# Patient Record
Sex: FEMALE | Race: WHITE | Employment: OTHER | URBAN - METROPOLITAN AREA
[De-identification: names, ages, dates, MRNs, and addresses within clinical notes are randomized per-mention and may not be internally consistent; named-entity substitution may affect disease eponyms.]

---

## 2017-01-05 ENCOUNTER — ANESTHESIA EVENT (OUTPATIENT)
Dept: ENDOSCOPY | Age: 72
End: 2017-01-05
Payer: MEDICARE

## 2017-01-06 ENCOUNTER — ANESTHESIA (OUTPATIENT)
Dept: ENDOSCOPY | Age: 72
End: 2017-01-06
Payer: MEDICARE

## 2017-01-06 ENCOUNTER — SURGERY (OUTPATIENT)
Age: 72
End: 2017-01-06

## 2017-01-06 PROCEDURE — 74011250636 HC RX REV CODE- 250/636

## 2017-01-06 PROCEDURE — 74011000250 HC RX REV CODE- 250

## 2017-01-06 RX ORDER — LIDOCAINE HYDROCHLORIDE 20 MG/ML
INJECTION, SOLUTION EPIDURAL; INFILTRATION; INTRACAUDAL; PERINEURAL AS NEEDED
Status: DISCONTINUED | OUTPATIENT
Start: 2017-01-06 | End: 2017-01-06 | Stop reason: HOSPADM

## 2017-01-06 RX ORDER — PROPOFOL 10 MG/ML
INJECTION, EMULSION INTRAVENOUS AS NEEDED
Status: DISCONTINUED | OUTPATIENT
Start: 2017-01-06 | End: 2017-01-06 | Stop reason: HOSPADM

## 2017-01-06 RX ADMIN — PROPOFOL 20 MG: 10 INJECTION, EMULSION INTRAVENOUS at 08:43

## 2017-01-06 RX ADMIN — PROPOFOL 20 MG: 10 INJECTION, EMULSION INTRAVENOUS at 08:41

## 2017-01-06 RX ADMIN — PROPOFOL 20 MG: 10 INJECTION, EMULSION INTRAVENOUS at 08:45

## 2017-01-06 RX ADMIN — PROPOFOL 20 MG: 10 INJECTION, EMULSION INTRAVENOUS at 08:39

## 2017-01-06 RX ADMIN — PROPOFOL 20 MG: 10 INJECTION, EMULSION INTRAVENOUS at 08:37

## 2017-01-06 RX ADMIN — PROPOFOL 20 MG: 10 INJECTION, EMULSION INTRAVENOUS at 08:47

## 2017-01-06 RX ADMIN — LIDOCAINE HYDROCHLORIDE 40 MG: 20 INJECTION, SOLUTION EPIDURAL; INFILTRATION; INTRACAUDAL; PERINEURAL at 08:37

## 2017-01-06 NOTE — ANESTHESIA PREPROCEDURE EVALUATION
Anesthetic History   No history of anesthetic complications            Review of Systems / Medical History  Patient summary reviewed, nursing notes reviewed and pertinent labs reviewed    Pulmonary          Smoker      Comments: Former smoker   Neuro/Psych   Within defined limits           Cardiovascular    Hypertension: well controlled              Exercise tolerance: >4 METS     GI/Hepatic/Renal     GERD: well controlled           Endo/Other  Within defined limits           Other Findings              Physical Exam    Airway  Mallampati: I  TM Distance: 4 - 6 cm  Neck ROM: normal range of motion   Mouth opening: Normal     Cardiovascular    Rhythm: regular  Rate: normal         Dental  No notable dental hx       Pulmonary  Breath sounds clear to auscultation               Abdominal  GI exam deferred       Other Findings            Anesthetic Plan    ASA: 2  Anesthesia type: total IV anesthesia          Induction: Intravenous  Anesthetic plan and risks discussed with: Patient

## 2017-01-06 NOTE — ANESTHESIA POSTPROCEDURE EVALUATION
Post-Anesthesia Evaluation and Assessment    Patient: Yessenia Tatum MRN: [id number]  SSN: [id number]    YOB: 1945  Age: 66 y.o. Sex: female       Cardiovascular Function/Vital Signs  Visit Vitals    /60    Pulse 73    Temp 36.5 °C (97.7 °F)    Resp 15    Ht 5' 3.5\" (1.613 m)    Wt 57.3 kg (126 lb 5 oz)    SpO2 95%    BMI 22.02 kg/m2       Patient is status post total IV anesthesia, general anesthesia for Procedure(s):  COLONOSCOPY. Nausea/Vomiting: None    Postoperative hydration reviewed and adequate. Pain:  Pain Scale 1: Visual (01/06/17 0857)  Pain Intensity 1: 0 (01/06/17 0857)   Managed    Neurological Status: At baseline    Mental Status and Level of Consciousness: Arousable    Pulmonary Status:   O2 Device: Room air (01/06/17 0857)   Adequate oxygenation and airway patent    Complications related to anesthesia: None    Post-anesthesia assessment completed.  No concerns    Signed By: Cameron Santiago MD     January 6, 2017

## 2017-08-21 ENCOUNTER — OFFICE VISIT (OUTPATIENT)
Dept: INTERNAL MEDICINE CLINIC | Age: 72
End: 2017-08-21

## 2017-08-21 VITALS
DIASTOLIC BLOOD PRESSURE: 82 MMHG | WEIGHT: 129 LBS | BODY MASS INDEX: 22.86 KG/M2 | SYSTOLIC BLOOD PRESSURE: 120 MMHG | HEIGHT: 63 IN

## 2017-08-21 DIAGNOSIS — L03.116 CELLULITIS OF LEG WITHOUT FOOT, LEFT: Primary | ICD-10-CM

## 2017-08-21 RX ORDER — ATENOLOL 50 MG/1
TABLET ORAL
Refills: 6 | COMMUNITY
Start: 2017-08-05 | End: 2017-08-21 | Stop reason: SDUPTHER

## 2017-08-21 RX ORDER — CEFDINIR 300 MG/1
300 CAPSULE ORAL 2 TIMES DAILY
Qty: 20 CAP | Refills: 0 | Status: SHIPPED | OUTPATIENT
Start: 2017-08-21 | End: 2017-12-13 | Stop reason: ALTCHOICE

## 2017-08-21 NOTE — PROGRESS NOTES
Barbara Poe is a 66 y.o. female presenting for Wound Check (RM 3  - 2 wounds on L leg - one from fall and one that car door hitting it)  . 1. Have you been to the ER, urgent care clinic since your last visit? Hospitalized since your last visit? No    2. Have you seen or consulted any other health care providers outside of the [address] [place] [address] since your last visit? Include any pap smears or colon screening.  Yes When: july Where: Dr Marilu Lopez Reason for visit: Tisha Rudd

## 2017-08-21 NOTE — MR AVS SNAPSHOT
Visit Information Date & Time Provider Department Dept. Phone Encounter #  
 8/21/2017  2:40 PM ADAL Weller MD [facility] [id number] Follow-up Instructions Return if symptoms worsen or fail to improve. Your Appointments 12/13/2017  8:30 AM  
COMPLETE PHYSICAL with MD ANNA Guy [facility] ([facility]) Appt Note: [facility] [address] [id number] [address] [facility] [phone] Upcoming Health Maintenance Date Due DTaP/Tdap/Td series (1 - Tdap) 9/6/1964 FOBT Q 1 YEAR AGE 50-75 9/6/1993 ZOSTER VACCINE AGE 60> 7/6/2003 GLAUCOMA SCREENING Q2Y 9/6/2008 OSTEOPOROSIS SCREENING (DEXA) 9/6/2008 Pneumococcal 65+ Low/Medium Risk (1 of 2 - PCV13) 9/6/2008 MEDICARE YEARLY EXAM 9/6/2008 BREAST CANCER SCRN MAMMOGRAM 8/24/2011 INFLUENZA AGE 9 TO ADULT 8/1/2017 Allergies as of 8/21/2017  Review Complete On: 8/21/2017 By: Gloria Rosales MD  
 No Known Allergies Current Immunizations  Never Reviewed No immunizations on file. Not reviewed this visit You Were Diagnosed With   
  
 Codes Comments Cellulitis of leg without foot, left    -  Primary ICD-10-CM: Z85.208 ICD-9-CM: 118. 6 Vitals BP Height(growth percentile) Weight(growth percentile) BMI OB Status Smoking Status 120/82 (BP 1 Location: Left arm) 5' 3\" (1.6 m) 129 lb (58.5 kg) 22.85 kg/m2 Postmenopausal Former Smoker Vitals History BMI and BSA Data Body Mass Index Body Surface Area  
 22.85 kg/m 2 1.61 m 2 Preferred Pharmacy Pharmacy Name Phone [phone] [facility] [phone] Your Updated Medication List  
  
   
This list is accurate as of: 8/21/17  3:56 PM.  Always use your most recent med list.  
  
 atenolol 25 mg tablet Commonly known as:  TENORMIN Take 25 mg by mouth two (2) times a day. cefdinir 300 mg capsule Commonly known as:  OMNICEF Take 1 Cap by mouth two (2) times a day. Prescriptions Sent to Pharmacy Refills  
 cefdinir (OMNICEF) 300 mg capsule 0 Sig: Take 1 Cap by mouth two (2) times a day. Class: Normal  
 Pharmacy: [address] #: [phone] Route: Oral  
  
Follow-up Instructions Return if symptoms worsen or fail to improve. Introducing [facility]! Vick Carey introduces [employer] patient portal. Now you can access parts of your medical record, email your doctor's office, and request medication refills online. 1. In your internet browser, go to [email or web address] 2. Click on the First Time User? Click Here link in the Sign In box. You will see the New Member Sign Up page. 3. Enter your [employer] Access Code exactly as it appears below. You will not need to use this code after youve completed the sign-up process. If you do not sign up before the expiration date, you must request a new code. · [employer] Access Code: [email or web address] Expires: 11/19/2017  2:30 PM 
 
4. Enter the last four digits of your Social Security Number (xxxx) and Date of Birth (mm/dd/yyyy) as indicated and click Submit. You will be taken to the next sign-up page. 5. Create a [employer] ID. This will be your [employer]iny login ID and cannot be changed, so think of one that is secure and easy to remember. 6. Create a [employer] password. You can change your password at any time. 7. Enter your Password Reset Question and Answer. This can be used at a later time if you forget your password. 8. Enter your e-mail address. You will receive e-mail notification when new information is available in [address] 9. Click Sign Up. You can now view and download portions of your medical record. 10. Click the Download Summary menu link to download a portable copy of your medical information. If you have questions, please visit the Frequently Asked Questions section of the [employer] website. Remember, [employer] is NOT to be used for urgent needs. For medical emergencies, dial 911. Now available from your iPhone and Android! Please provide this summary of care documentation to your next provider. Your primary care clinician is listed as Willie Romero. If you have any questions after today's visit, please call [phone].

## 2017-08-21 NOTE — PROGRESS NOTES
This note will not be viewable in [address] Cyrus Ching is a [address] y.o. female and presents with Wound Check (RM 3  - 2 wounds on L leg - one from fall and one that car door hitting it)  . Subjective:  Mrs. klein presents today with complaint of a wound on her left lower leg. This occurred after the car door hit her leg and she felt a large blood blister. It spontaneously drained and the skin that was overlying came off as well. She subsequently fell and skinned her left knee as well. Because she had redness and warmth surrounding the site she presented today for evaluation. She denies any fever chills or rigors. She denies any drainage. Past Medical History:   Diagnosis Date    GERD (gastroesophageal reflux disease)     Hypertension      Past Surgical History:   Procedure Laterality Date    COLONOSCOPY N/A 1/6/2017    COLONOSCOPY performed by Javier Xiong MD at [facility] ENDOSCOPY    HX HEENT      eye surgery    ID COLON CA SCRN NOT HI RSK IND  1/6/2017          No Known Allergies  Current Outpatient Prescriptions   Medication Sig Dispense Refill    cefdinir (OMNICEF) 300 mg capsule Take 1 Cap by mouth two (2) times a day. 20 Cap 0    atenolol (TENORMIN) 25 mg tablet Take 25 mg by mouth two (2) times a day. Social History     Social History    Marital status: UNKNOWN     Spouse name: N/A    Number of children: N/A    Years of education: N/A     Social History Main Topics    Smoking status: Former Smoker    Smokeless tobacco: Never Used    Alcohol use 0.6 oz/week     1 Glasses of wine per week    Drug use: No    Sexual activity: Not Asked     Other Topics Concern    None     Social History Narrative     History reviewed. No pertinent family history.     Review of Systems  Constitutional:  negative for fevers, chills, anorexia and weight loss  Eyes:    negative for visual disturbance and irritation  ENT:    negative for tinnitus,sore throat,nasal congestion,ear pains.hoarseness  Respiratory:     negative for cough, hemoptysis, dyspnea,wheezing  CV:    negative for chest pain, palpitations, lower extremity edema  GI:    negative for nausea, vomiting, diarrhea, abdominal pain,melena  Endo:               negative for polyuria,polydipsia,polyphagia,heat intolerance  Genitourinary : negative for frequency, dysuria and hematuria  Integumentary: negative for rash and pruritus  Hematologic:   negative for easy bruising and gum/nose bleeding  Musculoskel:  negative for myalgias, arthralgias, back pain, muscle weakness, joint pain  Neurological:   negative for headaches, dizziness, vertigo, memory problems and gait   Behavl/Psych:  negative for feelings of anxiety, depression, mood changes  ROS otherwise negative      Objective:  Visit Vitals    /82 (BP 1 Location: Left arm)    Ht 5' 3\" (1.6 m)    Wt 129 lb (58.5 kg)    BMI 22.85 kg/m2     Physical Exam:   General appearance - alert, well appearing, and in no distress  Mental status - alert, oriented to person, place, and time  EYE-TATYANA, EOMI, fundi normal, corneas normal, no foreign bodies  ENT-ENT exam normal, no neck nodes or sinus tenderness  Nose - normal and patent, no erythema, discharge or polyps  Mouth - mucous membranes moist, pharynx normal without lesions  Neck - supple, no significant adenopathy   Chest - clear to auscultation, no wheezes, rales or rhonchi, symmetric air entry   Heart - normal rate, regular rhythm, normal S1, S2, no murmurs, rubs, clicks or gallops   Abdomen - soft, nontender, nondistended, no masses or organomegaly  Lymph- no adenopathy palpable  Ext-1 cm ulceration with surrounding erythema and calor measuring approximately 6 cm in diameter  On the left lower extremity. Over the left knee there is a small abrasion measuring approximately half centimeter each. Skin-Warm and dry.  no hyperpigmentation, vitiligo, or suspicious lesions  Neuro -alert, oriented, normal speech, no focal findings or movement disorder noted      Assessment/Plan:  Diagnoses and all orders for this visit:    1. Cellulitis of leg without foot, left    Other orders  -     cefdinir (OMNICEF) 300 mg capsule; Take 1 Cap by mouth two (2) times a day. ICD-10-CM ICD-9-CM    1. Cellulitis of leg without foot, left L03.116 682.6      Plan:    Cover cellulitis with a cephalosporin as noted above. Dressed with Neosporin and bandaged as appropriate. Follow-up if not improved. Follow-up Disposition:  Return if symptoms worsen or fail to improve. I have reviewed with the patient details of the assessment and plan and all questions were answered. Relevent patient education was performed. Verbal and/or written instructions (see AVS) provided. The most recent lab findings were reviewed with the patient. An After Visit Summary was printed and given to the patient.     Yecenia Luis MD

## 2017-12-11 PROBLEM — I10 HYPERTENSION, BENIGN: Status: ACTIVE | Noted: 2017-12-11

## 2017-12-11 PROBLEM — Z79.899 ON STATIN THERAPY: Status: ACTIVE | Noted: 2017-12-11

## 2017-12-11 PROBLEM — L84 PRE-ULCERATIVE CORN OR CALLOUS: Status: ACTIVE | Noted: 2017-12-11

## 2017-12-11 PROBLEM — F32.A DEPRESSION: Status: ACTIVE | Noted: 2017-12-11

## 2017-12-11 PROBLEM — H26.9 CATARACT: Status: ACTIVE | Noted: 2017-12-11

## 2017-12-11 PROBLEM — N18.9 CHRONIC KIDNEY INSUFFICIENCY: Status: ACTIVE | Noted: 2017-12-11

## 2017-12-11 PROBLEM — M10.9 GOUT: Status: ACTIVE | Noted: 2017-12-11

## 2017-12-11 PROBLEM — H40.9 GLAUCOMA: Status: ACTIVE | Noted: 2017-12-11

## 2017-12-11 PROBLEM — E11.9 DIABETES MELLITUS TYPE 2, DIET-CONTROLLED (HCC): Status: ACTIVE | Noted: 2017-12-11

## 2017-12-11 PROBLEM — R53.83 FATIGUE: Status: ACTIVE | Noted: 2017-12-11

## 2017-12-11 PROBLEM — S99.921A RIGHT FOOT INJURY: Status: ACTIVE | Noted: 2017-12-11

## 2017-12-11 PROBLEM — E78.5 DYSLIPIDEMIA: Status: ACTIVE | Noted: 2017-12-11

## 2017-12-11 RX ORDER — DORZOLAMIDE HCL 20 MG/ML
2 SOLUTION/ DROPS OPHTHALMIC 3 TIMES DAILY
COMMUNITY
End: 2018-07-22

## 2017-12-11 RX ORDER — ASPIRIN 81 MG/1
TABLET ORAL DAILY
COMMUNITY

## 2017-12-11 RX ORDER — ASCORBIC ACID 250 MG
250 TABLET ORAL DAILY
COMMUNITY

## 2017-12-13 ENCOUNTER — OFFICE VISIT (OUTPATIENT)
Dept: INTERNAL MEDICINE CLINIC | Age: 72
End: 2017-12-13

## 2017-12-13 VITALS
BODY MASS INDEX: 22.47 KG/M2 | WEIGHT: 126.8 LBS | SYSTOLIC BLOOD PRESSURE: 138 MMHG | RESPIRATION RATE: 14 BRPM | DIASTOLIC BLOOD PRESSURE: 78 MMHG | TEMPERATURE: 98 F | HEART RATE: 65 BPM | HEIGHT: 63 IN | OXYGEN SATURATION: 98 %

## 2017-12-13 DIAGNOSIS — E11.9 DIABETES MELLITUS TYPE 2, DIET-CONTROLLED (HCC): ICD-10-CM

## 2017-12-13 DIAGNOSIS — Z13.31 SCREENING FOR DEPRESSION: ICD-10-CM

## 2017-12-13 DIAGNOSIS — E78.5 DYSLIPIDEMIA: ICD-10-CM

## 2017-12-13 DIAGNOSIS — I10 HYPERTENSION, BENIGN: ICD-10-CM

## 2017-12-13 DIAGNOSIS — Z13.1 SCREENING FOR DIABETES MELLITUS: ICD-10-CM

## 2017-12-13 DIAGNOSIS — Z23 ENCOUNTER FOR IMMUNIZATION: ICD-10-CM

## 2017-12-13 DIAGNOSIS — N18.1 CHRONIC RENAL IMPAIRMENT, STAGE 1: ICD-10-CM

## 2017-12-13 DIAGNOSIS — Z00.00 MEDICARE ANNUAL WELLNESS VISIT, SUBSEQUENT: Primary | ICD-10-CM

## 2017-12-13 DIAGNOSIS — Z13.39 SCREENING FOR ALCOHOLISM: ICD-10-CM

## 2017-12-13 DIAGNOSIS — R53.83 FATIGUE, UNSPECIFIED TYPE: ICD-10-CM

## 2017-12-13 DIAGNOSIS — M10.00 IDIOPATHIC GOUT, UNSPECIFIED CHRONICITY, UNSPECIFIED SITE: ICD-10-CM

## 2017-12-13 LAB
ALBUMIN SERPL-MCNC: 4.7 G/DL (ref 3.9–5.4)
ALKALINE PHOS POC: 75 U/L (ref 38–126)
ALT SERPL-CCNC: 23 U/L (ref 9–52)
AST SERPL-CCNC: 28 U/L (ref 14–36)
BACTERIA UA POCT, BACTPOCT: NORMAL
BILIRUB UR QL STRIP: NEGATIVE
BUN BLD-MCNC: 26 MG/DL (ref 7–17)
CALCIUM BLD-MCNC: 10.6 MG/DL (ref 8.4–10.2)
CASTS UA POCT: NORMAL
CHLORIDE BLD-SCNC: 102 MMOL/L (ref 98–107)
CHOLEST SERPL-MCNC: 284 MG/DL (ref 0–200)
CLUE CELLS, CLUEPOCT: NEGATIVE
CO2 POC: 28 MMOL/L (ref 22–32)
CREAT BLD-MCNC: 0.9 MG/DL (ref 0.7–1.2)
CRYSTALS UA POCT, CRYSPOCT: NEGATIVE
EGFR (POC): 63
EPITHELIAL CELLS POCT: NORMAL
GLUCOSE POC: 134 MG/DL (ref 65–105)
GLUCOSE UR-MCNC: NEGATIVE MG/DL
GRAN# POC: 7.4 K/UL (ref 2–7.8)
GRAN% POC: 69.6 % (ref 37–92)
HBA1C MFR BLD HPLC: 5.4 % (ref 4.5–5.7)
HCT VFR BLD CALC: 48.5 % (ref 37–51)
HDLC SERPL-MCNC: 40 MG/DL (ref 35–130)
HGB BLD-MCNC: 15.9 G/DL (ref 12–18)
KETONES P FAST UR STRIP-MCNC: NEGATIVE MG/DL
LDL CHOLESTEROL POC: 209.4 MG/DL (ref 0–130)
LY# POC: 2.8 K/UL (ref 0.6–4.1)
LY% POC: 27.5 % (ref 10–58.5)
MCH RBC QN: 27.6 PG (ref 26–32)
MCHC RBC-ENTMCNC: 32.8 G/DL (ref 30–36)
MCV RBC: 84 FL (ref 80–97)
MICROALBUMIN UR TEST STR-MCNC: 100 MG/L (ref 0–20)
MID #, POC: 0.3 K/UL (ref 0–1.8)
MID% POC: 2.9 % (ref 0.1–24)
MUCUS UA POCT, MUCPOCT: NORMAL
PH UR STRIP: 6 [PH] (ref 5–7)
PLATELET # BLD: 224 K/UL (ref 140–440)
POTASSIUM SERPL-SCNC: 4 MMOL/L (ref 3.6–5)
PROT SERPL-MCNC: 7.6 G/DL (ref 6.3–8.2)
PROT UR QL STRIP: NORMAL
RBC # BLD: 5.76 M/UL (ref 4.2–6.3)
RBC UA POCT, RBCPOCT: NORMAL
SODIUM SERPL-SCNC: 145 MMOL/L (ref 137–145)
SP GR UR STRIP: 1.02 (ref 1.01–1.02)
TCHOL/HDL RATIO (POC): 7.1 (ref 0–4)
TOTAL BILIRUBIN POC: 1 MG/DL (ref 0.2–1.3)
TRICH UA POCT, TRICHPOC: NEGATIVE
TRIGL SERPL-MCNC: 173 MG/DL (ref 0–200)
UA UROBILINOGEN AMB POC: NORMAL (ref 0.2–1)
URINALYSIS CLARITY POC: CLEAR
URINALYSIS COLOR POC: NORMAL
URINE BLOOD POC: NORMAL
URINE CULT COMMENT, POCT: NORMAL
URINE LEUKOCYTES POC: NEGATIVE
URINE NITRITES POC: NEGATIVE
VLDLC SERPL CALC-MCNC: 34.6 MG/DL
WBC # BLD: 10.5 K/UL (ref 4.1–10.9)
WBC UA POCT, WBCPOCT: NORMAL
YEAST UA POCT, YEASTPOC: NEGATIVE

## 2017-12-13 NOTE — PROGRESS NOTES
1. Have you been to the ER, urgent care clinic since your last visit? Hospitalized since your last visit? No    2. Have you seen or consulted any other health care providers outside of the [address] [place] [address] since your last visit? Include any pap smears or colon screening. No     Chief Complaint   Patient presents with    Annual Wellness Visit     Fasting     Eye exam last done around 5/2017 with Dr. Chris Haas.

## 2017-12-13 NOTE — PROGRESS NOTES
This is a Subsequent Medicare Annual Wellness Exam (AWV) (Performed 12 months after IPPE or effective date of Medicare Part B enrollment)    I have reviewed the patient's medical history in detail and updated the computerized patient record. History     Past Medical History:   Diagnosis Date    Cataract 12/11/2017    Chronic kidney insufficiency 12/11/2017    Depression 12/11/2017    Diabetes mellitus type 2, diet-controlled ([address] 12/11/2017    Dyslipidemia 12/11/2017    Fatigue 12/11/2017    GERD (gastroesophageal reflux disease)     Glaucoma 12/11/2017    Gout 12/11/2017    Hypertension     Hypertension, benign 12/11/2017    On statin therapy 12/11/2017    Pre-ulcerative corn or callous 12/11/2017    Right foot injury 12/11/2017      Past Surgical History:   Procedure Laterality Date    COLONOSCOPY N/A 1/6/2017    COL[facility]    HX HEENT      eye surgery    TX COLON CA SCRN NOT HI RSK IND  1/6/2017          Current Outpatient Prescriptions   Medication Sig Dispense Refill    aspirin delayed-release 81 mg tablet Take  by mouth daily.  ascorbic acid, vitamin C, (VITAMIN C) 250 mg tablet Take  by mouth.  CALCIUM CARBONATE/VITAMIN D3 (CALCIUM+D PO) Take  by mouth.  atenolol (TENORMIN) 25 mg tablet Take 25 mg by mouth two (2) times a day.  dorzolamide (TRUSOPT) 2 % ophthalmic solution Administer 2 Drops to both eyes three (3) times daily. Allergies   Allergen Reactions    Diclofenac Unknown (comments)     History reviewed. No pertinent family history.   Social History   Substance Use Topics    Smoking status: Former Smoker    Smokeless tobacco: Never Used    Alcohol use 0.6 oz/week     1 Glasses of wine per week     Patient Active Problem List   Diagnosis Code    Glaucoma H40.9    Chronic kidney insufficiency N18.9    Diabetes mellitus type 2, diet-controlled ([address] E11.9    Dyslipidemia E78.5    Hypertension, benign I10    Cataract H26.9    Pre-ulcerative corn or callous L84    Right foot injury S99.921A    Depression F32.9    Gout M10.9    On statin therapy Z79.899    Fatigue R53.83       Depression Risk Factor Screening:     PHQ over the last two weeks 8/21/2017   Little interest or pleasure in doing things Not at all   Feeling down, depressed or hopeless Not at all   Total Score PHQ 2 0     Alcohol Risk Factor Screening: You do not drink alcohol or very rarely. Functional Ability and Level of Safety:   Hearing Loss  Hearing is good. Activities of Daily Living  The home contains: no safety equipment. Patient does total self care    Fall RiskFall Risk Assessment, last 12 mths 8/21/2017   Able to walk? Yes   Fall in past 12 months? Yes   Fall with injury? Yes   Number of falls in past 12 months 1   Fall Risk Score 2       Abuse Screen  Patient is not abused    Cognitive Screening   Evaluation of Cognitive Function:  Has your family/caregiver stated any concerns about your memory: no  Normal    Patient Care Team   Patient Care Team:  Shellie Cheema MD as PCP - General (Dermatology)    Assessment/Plan   Education and counseling provided:  Are appropriate based on today's review and evaluation  Pneumococcal Vaccine  Influenza Vaccine  Screening Mammography  Screening Pap and pelvic (covered once every 2 years)  Colorectal cancer screening tests  Cardiovascular screening blood test  Bone mass measurement (DEXA)  Diabetes screening test    Diagnoses and all orders for this visit:    1. Medicare annual wellness visit, subsequent    2. Screening for alcoholism  -     Annual  Alcohol Screen 15 min ()    3. Screening for depression  -     Depression Screen Annual    4.  Screening for diabetes mellitus      Health Maintenance Due   Topic Date Due    HEMOGLOBIN A1C Q6M  1945    LIPID PANEL Q1  1945    FOOT EXAM Q1  09/06/1953    MICROALBUMIN Q1  09/06/1953    EYE EXAM RETINAL OR DILATED Q1  09/06/1953    DTaP/Tdap/Td series (1 - Tdap) 09/06/1964    FOBT Q 1 YEAR AGE 50-75  09/06/1993    ZOSTER VACCINE AGE 60>  07/06/2003    GLAUCOMA SCREENING Q2Y  09/06/2008    OSTEOPOROSIS SCREENING (DEXA)  09/06/2008    MEDICARE YEARLY EXAM  09/06/2008     This note will not be viewable in MyChart. Harry Stiles is a 74 y.o. female and presents with Annual Wellness Visit  . Subjective: This is Rubik presents today for follow-up of hyperlipidemia, adult onset diabetes mellitus which is diet-controlled, mild renal insufficiency and history of hypertension. She has been generally feeling well without significant complaint. She has had no chest pain, shortness breath, PND, orthopnea, or pedal edema. Past Medical History:   Diagnosis Date    Cataract 12/11/2017    Chronic kidney insufficiency 12/11/2017    Depression 12/11/2017    Diabetes mellitus type 2, diet-controlled ([address] 12/11/2017    Dyslipidemia 12/11/2017    Fatigue 12/11/2017    GERD (gastroesophageal reflux disease)     Glaucoma 12/11/2017    Gout 12/11/2017    Hypertension     Hypertension, benign 12/11/2017    On statin therapy 12/11/2017    Pre-ulcerative corn or callous 12/11/2017    Right foot injury 12/11/2017     Past Surgical History:   Procedure Laterality Date    COLONOSCOPY N/A 1/6/2017    COL[facility] Jacinto MD at [facility] ENDOSCOPY    HX HEENT      eye surgery    MO COLON CA SCRN NOT HI RSK IND  1/6/2017          Allergies   Allergen Reactions    Diclofenac Unknown (comments)     Current Outpatient Prescriptions   Medication Sig Dispense Refill    aspirin delayed-release 81 mg tablet Take  by mouth daily.  ascorbic acid, vitamin C, (VITAMIN C) 250 mg tablet Take  by mouth.  CALCIUM CARBONATE/VITAMIN D3 (CALCIUM+D PO) Take  by mouth.  atenolol (TENORMIN) 25 mg tablet Take 25 mg by mouth two (2) times a day.       dorzolamide (TRUSOPT) 2 % ophthalmic solution Administer 2 Drops to both eyes three (3) times daily. Social History     Social History    Marital status: UNKNOWN     Spouse name: N/A    Number of children: N/A    Years of education: N/A     Social History Main Topics    Smoking status: Former Smoker    Smokeless tobacco: Never Used    Alcohol use 0.6 oz/week     1 Glasses of wine per week    Drug use: No    Sexual activity: Not Asked     Other Topics Concern    None     Social History Narrative     History reviewed. No pertinent family history. Review of Systems  Constitutional:  negative for fevers, chills, anorexia and weight loss  Eyes:    negative for visual disturbance and irritation  ENT:    negative for tinnitus,sore throat,nasal congestion,ear pains. hoarseness  Respiratory:     negative for cough, hemoptysis, dyspnea,wheezing  CV:    negative for chest pain, palpitations, lower extremity edema  GI:    negative for nausea, vomiting, diarrhea, abdominal pain,melena  Endo:               negative for polyuria,polydipsia,polyphagia,heat intolerance  Genitourinary : negative for frequency, dysuria and hematuria  Integumentary: negative for rash and pruritus  Hematologic:   negative for easy bruising and gum/nose bleeding  Musculoskel:  negative for myalgias, arthralgias, back pain, muscle weakness, joint pain  Neurological:   negative for headaches, dizziness, vertigo, memory problems and gait   Behavl/Psych:  negative for feelings of anxiety, depression, mood changes  ROS otherwise negative      Objective:  Visit Vitals    /78 (BP 1 Location: Right arm, BP Patient Position: Sitting)    Pulse 65    Temp 98 °F (36.7 °C) (Oral)    Resp 14    Ht 5' 3\" (1.6 m)    Wt 126 lb 12.8 oz (57.5 kg)    SpO2 98%    BMI 22.46 kg/m2     Physical Exam:   General appearance - alert, well appearing, and in no distress  Mental status - alert, oriented to person, place, and time  EYE-TATYANA, EOMI, fundi normal, corneas normal, no foreign bodies  ENT-ENT exam normal, no neck nodes or sinus tenderness  Nose - normal and patent, no erythema, discharge or polyps  Mouth - mucous membranes moist, pharynx normal without lesions  Neck - supple, no significant adenopathy   Chest - clear to auscultation, no wheezes, rales or rhonchi, symmetric air entry   Heart - normal rate, regular rhythm, normal S1, S2, no murmurs, rubs, clicks or gallops   Abdomen - soft, nontender, nondistended, no masses or organomegaly  Lymph- no adenopathy palpable  Ext-peripheral pulses normal, no pedal edema, no clubbing or cyanosis  Skin-Warm and dry. no hyperpigmentation, vitiligo, or suspicious lesions  Neuro -alert, oriented, normal speech, no focal findings or movement disorder noted      Assessment/Plan:  Diagnoses and all orders for this visit:    1. Medicare annual wellness visit, subsequent    2. Screening for alcoholism  -     Annual  Alcohol Screen 15 min ()    3. Screening for depression  -     Depression Screen Annual    4. Screening for diabetes mellitus    5. Diabetes mellitus type 2, diet-controlled (HCC)  -     AMB POC HEMOGLOBIN A1C  -     AMB POC URINE, MICROALBUMIN, SEMIQUANT (1 RESULT)    6. Chronic renal impairment, stage 1    7. Hypertension, benign  -     AMB POC COMPREHENSIVE METABOLIC PANEL  -     AMB POC URINALYSIS DIP STICK AUTO W/ MICRO     8. Dyslipidemia  -     AMB POC LIPID PROFILE    9. Idiopathic gout, unspecified chronicity, unspecified site    10. Fatigue, unspecified type  -     AMB POC COMPLETE CBC,AUTOMATED ENTER    11. Encounter for immunization  -     Pneumococcal Polysaccharide vaccine, 23-Valent, Adult or Immunocompromised  -     ADMIN PNEUMOCOCCAL VACCINE  Medicare Injection Admin Charge          ICD-10-CM ICD-9-CM    1. Medicare annual wellness visit, subsequent Z00.00 V70.0    2. Screening for alcoholism Z13.89 V79.1 [place] ANNUAL ALCOHOL SCREEN 15 MIN   3. Screening for depression Z13.89 V79.0 DEPRESSION SCREEN ANNUAL   4. Screening for diabetes mellitus Z13.1 V77.1    5. Diabetes mellitus type 2, diet-controlled (HCC) E11.9 250.00 AMB POC HEMOGLOBIN A1C      AMB POC URINE, MICROALBUMIN, SEMIQUANT (1 RESULT)   6. Chronic renal impairment, stage 1 N18.1 585.1    7. Hypertension, benign I10 401.1 AMB POC COMPREHENSIVE METABOLIC PANEL      AMB POC URINALYSIS DIP STICK AUTO W/ MICRO    8. Dyslipidemia E78.5 272.4 AMB POC LIPID PROFILE   9. Idiopathic gout, unspecified chronicity, unspecified site M10.00 274.9    10. Fatigue, unspecified type R53.83 780.79 AMB POC COMPLETE CBC,AUTOMATED ENTER   11. Encounter for immunization Z23 V03.89 PNEUMOCOCCAL POLYSACCHARIDE VACCINE, 23-VALENT, ADULT OR IMMUNOSUPPRESSED PT DOSE,      ADMIN PNEUMOCOCCAL VACCINE     Plan:    Follow-up second Pneumovax today. She is up-to-date with the remainder of her immunizations save her shingles vaccine. She was informed that this was not covered by her insurance which is currently recommended by the CDC. Further recommendations based on lab results. Return to clinic in 6 months. Follow-up Disposition:  Return in about 6 months (around 6/13/2018) for follow up. I have reviewed with the patient details of the assessment and plan and all questions were answered. Relevent patient education was performed. Verbal and/or written instructions (see AVS) provided. The most recent lab findings were reviewed with the patient. Plan was discussed with patient who verbally expressed understanding. An After Visit Summary was printed and given to the patient.     Heather Chacon MD

## 2017-12-13 NOTE — PATIENT INSTRUCTIONS
Medicare Wellness Visit, Female    The best way to live healthy is to have a healthy lifestyle by eating a well-balanced diet, exercising regularly, limiting alcohol and stopping smoking. Regular physical exams and screening tests are another way to keep healthy. Preventive exams provided by your health care provider can find health problems before they become diseases or illnesses. Preventive services including immunizations, screening tests, monitoring and exams can help you take care of your own health. All people over age 72 should have a pneumovax  and and a prevnar shot to prevent pneumonia. These are once in a lifetime unless you and your provider decide differently. All people over 65 should have a yearly flu shot and a tetanus vaccine every 10 years. A bone mass density to screen for osteoporosis or thinning of the bones should be done every 2 years after 65. Screening for diabetes mellitus with a blood sugar test should be done every year. Glaucoma is a disease of the eye due to increased ocular pressure that can lead to blindness and it should be done every year by an eye professional.    Cardiovascular screening tests that check for elevated lipids (fatty part of blood) which can lead to heart disease and strokes should be done every 5 years. Colorectal screening that evaluates for blood or polyps in your colon should be done yearly as a stool test or every five years as a flexible sigmoidoscope or every 10 years as a colonoscopy up to age 76. Breast cancer screening with a mammogram is recommended biennially  for women age 54-69. Screening for cervical cancer with a pap smear and pelvic exam is recommended for women after age 72 years every 2 years up to age 79 or when the provider and patient decide to stop. If there is a history of cervical abnormalities or other increased risk for cancer then the test is recommended yearly.     Hepatitis C screening is also recommended for anyone born between [birth year] through [address]. A shingles vaccine is also recommended once in a lifetime after age 61. Your Medicare Wellness Exam is recommended annually. Here is a list of your current Health Maintenance items with a due date:  Health Maintenance Due   Topic Date Due    Hemoglobin A1C    1945    Cholesterol Test   1945    Diabetic Foot Care  09/06/1953    Albumin Urine Test  09/06/1953    Eye Exam  09/06/1953    DTaP/Tdap/Td  (1 - Tdap) 09/06/1964    Stool testing for trace blood  09/06/1993    Shingles Vaccine  07/06/2003    Glaucoma Screening   09/06/2008    Bone Density Screening  09/06/2008    Annual Well Visit  09/06/2008       All Medicare beneficiaries aged 48 and older are covered; however, when a beneficiary is at high risk, there is no minimum age required to receive a screening colonoscopy or a barium enema rendered as an alternative to a screening colonoscopy. The following are the coverage criteria for each colorectal cancer screening test/procedure. Screening FOBT  Medicare provides coverage of a screening FOBT annually (i.e., at least 11 months have passed following the month in which the last covered screening FOBT was performed) for beneficiaries aged 48 and older. This screening requires a written order from the beneficiarys attending physician. NOTE: Medicare will only provide coverage for one FOBT per year: either HCPCS code  or CPT code 03635, but not both. Screening Colonoscopy  For Beneficiaries at [facility] for Developing Colorectal Cancer  Medicare provides coverage of a screening colonoscopy (HCPCS code ) once every 2 years for beneficiaries at high risk for developing colorectal cancer (i.e., at least 23 months have passed following the month in which the last covered screening colonoscopy [HCPCS code ] was performed).     For Beneficiaries Not at [facility] for Developing Colorectal Cancer  Medicare provides coverage of a screening colonoscopy ([facility] code ) for beneficiaries who do not meet the criteria for being at high risk for developing colorectal cancer once every 10 years (i.e., at least 119 months have passed following the month in which the last covered screening colonoscopy [[facility] code ] was performed). If the beneficiary otherwise qualifies to have a covered screening colonoscopy ([facility] code ) based on the above but has had a covered screening flexible sigmoidoscopy ([facility] code ), then Medicare may cover a screening colonoscopy ([facility] code ) only after at least 47 months have passed following the month in which the last covered screening flexible sigmoidoscopy ([facility] code ) was performed. Beneficiaries Diagnosed with Pre-Diabetes  Medicare provides coverage for a maximum of 2 diabetes screening tests within a 12-month period (but not less than 6 months apart) for beneficiaries diagnosed with pre-diabetes. Beneficiaries Previously Tested but not Diagnosed as Pre-Diabetic or Who Have Never Been Tested  Medicare provides coverage for 1 diabetes screening test within a 12-month period (i.e., at least 11 months have passed following the month in which the last Medicare-covered diabetes screening test was performed) for beneficiaries who were previously tested and were not diagnosed with pre-diabetes, or who have never been tested. Risk Factors  To be eligible for the diabetes screening tests, beneficiaries must have any of the following risk factors:   Hypertension,   Dyslipidemia,   Obesity (a body mass index greater than or equal to 30 kg/m), or   Previous identification of an elevated impaired fasting glucose or glucose tolerance.   OR  At least two of the following characteristics:   Overweight (a body mass index greater than 25 but less than 30 kg/m),   Family history of diabetes,   Aged 72 years and older, or   A history of gestational diabetes mellitus or delivery of a baby weighing greater than 9 pounds. Vaccine Information Statement    Pneumococcal Polysaccharide Vaccine: What You Need to Know    Many Vaccine Information Statements are available in [place] and other languages. See www.immunize.org/vis. Hojas de información Sobre Vacunas están disponibles en español y en muchos otros idiomas. Visite Avelina.si. 1. Why get vaccinated? Vaccination can protect older adults (and some children and younger adults) from pneumococcal disease. Pneumococcal disease is caused by bacteria that can spread from person to person through close contact. It can cause ear infections, and it can also lead to more serious infections of the:   Lungs (pneumonia),   Blood (bacteremia), and   Covering of the brain and spinal cord (meningitis). Meningitis can cause deafness and brain damage, and it can be fatal.      Anyone can get pneumococcal disease, but children under 3years of age, people with certain medical conditions, adults over 72years of age, and cigarette smokers are at the highest risk. About 18,000 older adults die each year from pneumococcal disease in the [place]. Treatment of pneumococcal infections with penicillin and other drugs used to be more effective. But some strains of the disease have become resistant to these drugs. This makes prevention of the disease, through vaccination, even more important. 2. Pneumococcal polysaccharide vaccine (PPSV23)    Pneumococcal polysaccharide vaccine (PPSV23) protects against 23 types of pneumococcal bacteria. It will not prevent all pneumococcal disease. PPSV23 is recommended for:   All adults 72years of age and older,   Anyone 2 through 59years of age with certain long-term health problems,   Anyone 2 through 59years of age with a weakened immune system,   Adults 23 through 59years of age who smoke cigarettes or have asthma. Most people need only one dose of PPSV.   A second dose is recommended for certain high-risk groups. People 72 and older should get a dose even if they have gotten one or more doses of the vaccine before they turned 65. Your healthcare provider can give you more information about these recommendations. Most healthy adults develop protection within 2 to 3 weeks of getting the shot. 3. Some people should not get this vaccine     Anyone who has had a life-threatening allergic reaction to PPSV should not get another dose.  Anyone who has a severe allergy to any component of PPSV should not receive it. Tell your provider if you have any severe allergies.  Anyone who is moderately or severely ill when the shot is scheduled may be asked to wait until they recover before getting the vaccine. Someone with a mild illness can usually be vaccinated.  Children less than 3years of age should not receive this vaccine.  There is no evidence that PPSV is harmful to either a pregnant woman or to her fetus. However, as a precaution, women who need the vaccine should be vaccinated before becoming pregnant, if possible. 4. Risks of a vaccine reaction    With any medicine, including vaccines, there is a chance of side effects. These are usually mild and go away on their own, but serious reactions are also possible. About half of people who get PPSV have mild side effects, such as redness or pain where the shot is given, which go away within about two days. Less than 1 out of 100 people develop a fever, muscle aches, or more severe local reactions. Problems that could happen after any vaccine:     People sometimes faint after a medical procedure, including vaccination. Sitting or lying down for about 15 minutes can help prevent fainting, and injuries caused by a fall. Tell your doctor if you feel dizzy, or have vision changes or ringing in the ears.      Some people get severe pain in the shoulder and have difficulty moving the arm where a shot was given. This happens very rarely.  Any medication can cause a severe allergic reaction. Such reactions from a vaccine are very rare, estimated at about 1 in a million doses, and would happen within a few minutes to a few hours after the vaccination. As with any medicine, there is a very remote chance of a vaccine causing a serious injury or death. The safety of vaccines is always being monitored. For more information, visit: www.cdc.gov/vaccinesafety/     5. What if there is a serious reaction? What should I look for? Look for anything that concerns you, such as signs of a severe allergic reaction, very high fever, or unusual behavior. Signs of a severe allergic reaction can include hives, swelling of the face and throat, difficulty breathing, a fast heartbeat, dizziness, and weakness. These would usually start a few minutes to a few hours after the vaccination. What should I do? If you think it is a severe allergic reaction or other emergency that cant wait, call 9-1-1 or get to the nearest hospital. Otherwise, call your doctor. Afterward, the reaction should be reported to the Vaccine Adverse Event Reporting System (VAERS). Your doctor might file this report, or you can do it yourself through the VAERS web site at www.vaers. [facility].gov, or by calling 7-497.534.7303. VAERS does not give medical advice. 6. How can I learn more?  Ask your doctor. He or she can give you the vaccine package insert or suggest other sources of information.  Call your local or state health department.    Contact the Centers for Disease Control and Prevention (CDC):  - Call 2-314.220.4339 (1-800-CDC-INFO) or  - Visit CDCs website at [employer] 18 04/24/2015    [facility] for Disease Control and Prevention    Office Use Only

## 2017-12-13 NOTE — MR AVS SNAPSHOT
Visit Information Date & Time Provider Department Dept. Phone Encounter #  
 12/13/2017  8:30 AM ADAL Don MD [address] [place] [address] [facility] [phone] [id number] Follow-up Instructions Return in about 6 months (around 6/13/2018) for follow up. Upcoming Health Maintenance Date Due HEMOGLOBIN A1C Q6M 1945 LIPID PANEL Q1 1945 FOOT EXAM Q1 9/6/1953 MICROALBUMIN Q1 9/6/1953 EYE EXAM RETINAL OR DILATED Q1 9/6/1953 DTaP/Tdap/Td series (1 - Tdap) 9/6/1964 FOBT Q 1 YEAR AGE 50-75 9/6/1993 ZOSTER VACCINE AGE 60> 7/6/2003 GLAUCOMA SCREENING Q2Y 9/6/2008 OSTEOPOROSIS SCREENING (DEXA) 9/6/2008 MEDICARE YEARLY EXAM 9/6/2008 Allergies as of 12/13/2017  Review Complete On: 12/13/2017 By: Abran Gilmore MD  
  
 Severity Noted Reaction Type Reactions Diclofenac  12/11/2017    Unknown (comments) Current Immunizations  Reviewed on 12/13/2017 Name Date Influenza High Dose Vaccine PF 10/8/2017 Influenza Vaccine 11/28/2016, 10/8/2015, 10/16/2013 Pneumococcal Conjugate (PCV-13) 11/12/2015 Pneumococcal Polysaccharide (PPSV-23) 12/13/2017 Pneumococcal Vaccine (Unspecified Type) 9/27/2012 Reviewed by Abran Gilmore MD on 12/13/2017 at  8:59 AM  
You Were Diagnosed With   
  
 Codes Comments Medicare annual wellness visit, subsequent    -  Primary ICD-10-CM: Z00.00 ICD-9-CM: V70.0 Screening for alcoholism     ICD-10-CM: Z13.89 ICD-9-CM: V79.1 Screening for depression     ICD-10-CM: Z13.89 ICD-9-CM: V79.0 Screening for diabetes mellitus     ICD-10-CM: Z13.1 ICD-9-CM: V77.1 Diabetes mellitus type 2, diet-controlled ([facility] [address]     ICD-10-CM: E11.9 ICD-9-CM: 250.00 Chronic renal impairment, stage 1     ICD-10-CM: N18.1 ICD-9-CM: 585.1 Hypertension, benign     ICD-10-CM: I10 
ICD-9-CM: 401.1 Dyslipidemia     ICD-10-CM: E78.5 ICD-9-CM: 272.4 Idiopathic gout, unspecified chronicity, unspecified site     ICD-10-CM: M10.00 ICD-9-CM: 274.9 Fatigue, unspecified type     ICD-10-CM: R53.83 ICD-9-CM: 780.79 Encounter for immunization     ICD-10-CM: J04 ICD-9-CM: V03.89 Vitals BP Pulse Temp Resp Height(growth percentile) Weight(growth percentile) 138/78 (BP 1 Location: Right arm, BP Patient Position: Sitting) 65 98 °F (36.7 °C) (Oral) 14 5' 3\" (1.6 m) 126 lb 12.8 oz (57.5 kg) SpO2 BMI OB Status Smoking Status 98% 22.46 kg/m2 Postmenopausal Former Smoker Vitals History BMI and BSA Data Body Mass Index Body Surface Area  
 22.46 kg/m 2 1.6 m 2 Preferred Pharmacy Pharmacy Name Phone [facility]/PHARMACY #[phone] [facility] [phone] Your Updated Medication List  
  
   
This list is accurate as of: 12/13/17  9:22 AM.  Always use your most recent med list.  
  
  
  
  
 aspirin delayed-release 81 mg tablet Take  by mouth daily. atenolol 25 mg tablet Commonly known as:  TENORMIN Take 25 mg by mouth two (2) times a day. CALCIUM+D PO Take  by mouth. dorzolamide 2 % ophthalmic solution Commonly known as:  TRUSOPT Administer 2 Drops to both eyes three (3) times daily. VITAMIN C 250 mg tablet Generic drug:  ascorbic acid (vitamin C) Take  by mouth. We Performed the Following ADMIN PNEUMOCOCCAL VACCINE [ HCPCS] AMB POC COMPLETE CBC,AUTOMATED ENTER [id number] CPT(R)] AMB POC COMPREHENSIVE METABOLIC PANEL [92576 CPT(R)] AMB POC HEMOGLOBIN A1C [79915 CPT(R)] AMB POC LIPID PROFILE [40864 CPT(R)] AMB POC URINALYSIS DIP STICK AUTO W/ MICRO  [42482 CPT(R)] AMB POC URINE, MICROALBUMIN, SEMIQUANT (1 RESULT) [72852 CPT(R)] [facility][id number] HCPCS] PNEUMOCOCCAL POLYSACCHARIDE VACCINE, 23-VALENT, ADULT OR IMMUNOSUPPRESSED PT DOSE, [34818 CPT(R)] [place] ANNUAL ALCOHOL SCREEN 15 MIN [id number] [facility]] Follow-up Instructions Return in about 6 months (around 6/13/2018) for follow up. Patient Instructions Medicare Wellness Visit, Female The best way to live healthy is to have a healthy lifestyle by eating a well-balanced diet, exercising regularly, limiting alcohol and stopping smoking. Regular physical exams and screening tests are another way to keep healthy. Preventive exams provided by your health care provider can find health problems before they become diseases or illnesses. Preventive services including immunizations, screening tests, monitoring and exams can help you take care of your own health. All people over age 72 should have a pneumovax  and and a prevnar shot to prevent pneumonia. These are once in a lifetime unless you and your provider decide differently. All people over 65 should have a yearly flu shot and a tetanus vaccine every 10 years. A bone mass density to screen for osteoporosis or thinning of the bones should be done every 2 years after 65. Screening for diabetes mellitus with a blood sugar test should be done every year. Glaucoma is a disease of the eye due to increased ocular pressure that can lead to blindness and it should be done every year by an eye professional. 
 
Cardiovascular screening tests that check for elevated lipids (fatty part of blood) which can lead to heart disease and strokes should be done every 5 years. Colorectal screening that evaluates for blood or polyps in your colon should be done yearly as a stool test or every five years as a flexible sigmoidoscope or every 10 years as a colonoscopy up to age 76. Breast cancer screening with a mammogram is recommended biennially  for women age 54-69.  
 
Screening for cervical cancer with a pap smear and pelvic exam is recommended for women after age 72 years every 2 years up to age 79 or when the provider and patient decide to stop. If there is a history of cervical abnormalities or other increased risk for cancer then the test is recommended yearly. Hepatitis C screening is also recommended for anyone born between 80 through [address]. A shingles vaccine is also recommended once in a lifetime after age 61. Your Medicare Wellness Exam is recommended annually. Here is a list of your current Health Maintenance items with a due date: 
Health Maintenance Due Topic Date Due  
 Hemoglobin A1C    1945  Cholesterol Test   1945 [facility] Di[facility]  09/06/1953  Albumin Urine Test  09/06/1953 Yany Allen Eye Exam  09/06/1953  DTaP/Tdap/Td  (1 - Tdap) 09/06/1964  Stool testing for trace blood  09/06/1993  Shingles Vaccine  07/06/2003  Glaucoma Screening   09/06/2008  Bone Density Screening  09/06/2008 Yany Allen Annual Well Visit  09/06/2008 All Medicare beneficiaries aged 48 and older are covered; however, when a beneficiary is at high risk, there is no minimum age required to receive a screening colonoscopy or a barium enema rendered as an alternative to a screening colonoscopy. The following are the coverage criteria for each colorectal cancer screening test/procedure. Screening FOBT Medicare provides coverage of a screening FOBT annually (i.e., at least 11 months have passed following the month in which the last covered screening FOBT was performed) for beneficiaries aged 48 and older. This screening requires a written order from the beneficiarys attending physician. NOTE: Medicare will only provide coverage for one FOBT per year: either HCPCS code  or CPT code 17409, but not both. Screening Colonoscopy For Beneficiaries at [address] [place] [address] for Developing Colorectal Cancer Medicare provides coverage of a screening colonoscopy (HCPCS code ) once every 2 years for beneficiaries at high risk for developing colorectal cancer (i.e., at least 23 months have passed following the month in which the last covered screening colonoscopy [facility] was performed). For Beneficiaries Not at [address] [facility] for Developing Colorectal Cancer Medicare provides coverage of a screening colonoscopy ([facility] ) for beneficiaries who do not meet the criteria for being at high risk for developing colorectal cancer once every 10 years (i.e., at least 119 months have passed following the month in which the last covered screening colonoscopy [HCPCS code ] was performed). If the beneficiary otherwise qualifies to have a covered screening colonoscopy ([facility] ) based on the above but has had a covered screening flexible sigmoidoscopy ([facility] ), then Medicare may cover a screening colonoscopy ([facility] ) only after at least 47 months have passed following the month in which the last covered screening flexible sigmoidoscopy ([facility] ) was performed. Beneficiaries Diagnosed with Pre-Diabetes Medicare provides coverage for a maximum of 2 diabetes screening tests within a 12-month period (but not less than 6 months apart) for beneficiaries diagnosed with pre-diabetes. Beneficiaries Previously Tested but not Diagnosed as Pre-Diabetic or Who Have Never Been Tested Medicare provides coverage for 1 diabetes screening test within a 12-month period (i.e., at least 11 months have passed following the month in which the last Medicare-covered diabetes screening test was performed) for beneficiaries who were previously tested and were not diagnosed with pre-diabetes, or who have never been tested. Risk Factors To be eligible for the diabetes screening tests, beneficiaries must have any of the following risk factors:  Hypertension,  Dyslipidemia, 
 Obesity (a body mass index greater than or equal to 30 kg/m), or 
 Previous identification of an elevated impaired fasting glucose or glucose tolerance. OR At least two of the following characteristics:  Overweight (a body mass index greater than 25 but less than 30 kg/m),  Family history of diabetes,  Aged 72 years and older, or  A history of gestational diabetes mellitus or delivery of a baby weighing greater than 9 pounds. Vaccine Information Statement Pneumococcal Polysaccharide Vaccine: What You Need to Know Many Vaccine Information Statements are available in Azeri and other languages. See www.immunize.org/vis. Hojas de información Sobre Vacunas están disponibles en español y en muchos otros idiomas. Visite [facility]. 1. Why get vaccinated? Vaccination can protect older adults (and some children and younger adults) from pneumococcal disease. Pneumococcal disease is caused by bacteria that can spread from person to person through close contact. It can cause ear infections, and it can also lead to more serious infections of the: 
 Lungs (pneumonia),  Blood (bacteremia), and 
 Covering of the brain and spinal cord (meningitis). Meningitis can cause deafness and brain damage, and it can be fatal.   
 
Anyone can get pneumococcal disease, but children under 3years of age, people with certain medical conditions, adults over 72years of age, and cigarette smokers are at the highest risk. About 18,000 older adults die each year from pneumococcal disease in the [place]. Treatment of pneumococcal infections with penicillin and other drugs used to be more effective. But some strains of the disease have become resistant to these drugs. This makes prevention of the disease, through vaccination, even more important. 2. Pneumococcal polysaccharide vaccine (PPSV23) Pneumococcal polysaccharide vaccine (PPSV23) protects against 23 types of pneumococcal bacteria. It will not prevent all pneumococcal disease. PPSV23 is recommended for:  All adults 72years of age and older, 
  Anyone 2 through 59years of age with certain long-term health problems, 
 Anyone 2 through 59years of age with a weakened immune system, 
 Adults 23 through 59years of age who smoke cigarettes or have asthma. Most people need only one dose of PPSV. A second dose is recommended for certain high-risk groups. People 72 and older should get a dose even if they have gotten one or more doses of the vaccine before they turned 65. Your healthcare provider can give you more information about these recommendations. Most healthy adults develop protection within 2 to 3 weeks of getting the shot. 3. Some people should not get this vaccine  Anyone who has had a life-threatening allergic reaction to PPSV should not get another dose.  Anyone who has a severe allergy to any component of PPSV should not receive it. Tell your provider if you have any severe allergies.  Anyone who is moderately or severely ill when the shot is scheduled may be asked to wait until they recover before getting the vaccine. Someone with a mild illness can usually be vaccinated.  Children less than 3years of age should not receive this vaccine.  There is no evidence that PPSV is harmful to either a pregnant woman or to her fetus. However, as a precaution, women who need the vaccine should be vaccinated before becoming pregnant, if possible. 4. Risks of a vaccine reaction With any medicine, including vaccines, there is a chance of side effects. These are usually mild and go away on their own, but serious reactions are also possible. About half of people who get PPSV have mild side effects, such as redness or pain where the shot is given, which go away within about two days. Less than 1 out of 100 people develop a fever, muscle aches, or more severe local reactions. Problems that could happen after any vaccine:  People sometimes faint after a medical procedure, including vaccination. Sitting or lying down for about 15 minutes can help prevent fainting, and injuries caused by a fall. Tell your doctor if you feel dizzy, or have vision changes or ringing in the ears.  Some people get severe pain in the shoulder and have difficulty moving the arm where a shot was given. This happens very rarely.  Any medication can cause a severe allergic reaction. Such reactions from a vaccine are very rare, estimated at about 1 in a million doses, and would happen within a few minutes to a few hours after the vaccination. As with any medicine, there is a very remote chance of a vaccine causing a serious injury or death. The safety of vaccines is always being monitored. For more information, visit: www.cdc.gov/vaccinesafety/  
 
5. What if there is a serious reaction? What should I look for? Look for anything that concerns you, such as signs of a severe allergic reaction, very high fever, or unusual behavior. Signs of a severe allergic reaction can include hives, swelling of the face and throat, difficulty breathing, a fast heartbeat, dizziness, and weakness. These would usually start a few minutes to a few hours after the vaccination. What should I do? If you think it is a severe allergic reaction or other emergency that cant wait, call 9-1-1 or get to the nearest hospital. Otherwise, call your doctor. Afterward, the reaction should be reported to the Vaccine Adverse Event Reporting System (VAERS). Your doctor might file this report, or you can do it yourself through the VAERS web site at www.vaers. hhs.gov, or by calling 8-927.100.2083. VAERS does not give medical advice. 6. How can I learn more?  Ask your doctor. He or she can give you the vaccine package insert or suggest other sources of information.  Call your local or state health department.  
 Contact the Centers for Disease Control and Prevention (CDC): 
- Call 0-377.664.2632 (1-800-CDC-INFO) or 
 - Visit CDCs website at www.cdc.gov/vaccines Vaccine Information Statement PPSV  
04/24/2015 Department of [facility] and eyeOS Centers for Disease Control and Prevention Office Use Only Introducing [facility] [place]! Merlyn James introduces Orderlord patient portal. Now you can access parts of your medical record, email your doctor's office, and request medication refills online. 1. In your internet browser, go to [email or web address] 2. Click on the First Time User? Click Here link in the Sign In box. You will see the New Member Sign Up page. 3. Enter your Orderlord Access Code exactly as it appears below. You will not need to use this code after youve completed the sign-up process. If you do not sign up before the expiration date, you must request a new code. · Orderlord Access Code: [email or web address] Expires: 3/13/2018  8:19 AM 
 
4. Enter the last four digits of your Social Security Number (xxxx) and Date of Birth (mm/dd/yyyy) as indicated and click Submit. You will be taken to the next sign-up page. 5. Create a Orderlord ID. This will be your Orderlord login ID and cannot be changed, so think of one that is secure and easy to remember. 6. Create a Orderlord password. You can change your password at any time. 7. Enter your Password Reset Question and Answer. This can be used at a later time if you forget your password. 8. Enter your e-mail address. You will receive e-mail notification when new information is available in [address] 9. Click Sign Up. You can now view and download portions of your medical record. 10. Click the Download Summary menu link to download a portable copy of your medical information. If you have questions, please visit the Frequently Asked Questions section of the Orderlord website. Remember, Orderlord is NOT to be used for urgent needs. For medical emergencies, dial 911. Now available from your iPhone and Android! Please provide this summary of care documentation to your next provider. Your primary care clinician is listed as Ty Ezekiel. If you have any questions after today's visit, please call [phone].

## 2018-01-26 RX ORDER — ATENOLOL 50 MG/1
TABLET ORAL
Qty: 60 TAB | Refills: 6 | Status: SHIPPED | OUTPATIENT
Start: 2018-01-26 | End: 2018-06-15

## 2018-06-15 ENCOUNTER — OFFICE VISIT (OUTPATIENT)
Dept: INTERNAL MEDICINE CLINIC | Age: 73
End: 2018-06-15

## 2018-06-15 VITALS
RESPIRATION RATE: 16 BRPM | DIASTOLIC BLOOD PRESSURE: 70 MMHG | TEMPERATURE: 98.5 F | OXYGEN SATURATION: 99 % | HEIGHT: 63 IN | BODY MASS INDEX: 22.22 KG/M2 | WEIGHT: 125.4 LBS | SYSTOLIC BLOOD PRESSURE: 122 MMHG | HEART RATE: 70 BPM

## 2018-06-15 DIAGNOSIS — Z13.820 OSTEOPOROSIS SCREENING: ICD-10-CM

## 2018-06-15 DIAGNOSIS — M85.89 OTHER SPECIFIED DISORDERS OF BONE DENSITY AND STRUCTURE, MULTIPLE SITES: ICD-10-CM

## 2018-06-15 DIAGNOSIS — E78.5 DYSLIPIDEMIA: Primary | ICD-10-CM

## 2018-06-15 DIAGNOSIS — I10 HYPERTENSION, BENIGN: ICD-10-CM

## 2018-06-15 PROBLEM — Z79.899 ON STATIN THERAPY: Status: RESOLVED | Noted: 2017-12-11 | Resolved: 2018-06-15

## 2018-06-15 PROBLEM — L84 PRE-ULCERATIVE CORN OR CALLOUS: Status: RESOLVED | Noted: 2017-12-11 | Resolved: 2018-06-15

## 2018-06-15 PROBLEM — M10.9 GOUT: Status: RESOLVED | Noted: 2017-12-11 | Resolved: 2018-06-15

## 2018-06-15 PROBLEM — E11.9 DIABETES MELLITUS TYPE 2, DIET-CONTROLLED (HCC): Status: RESOLVED | Noted: 2017-12-11 | Resolved: 2018-06-15

## 2018-06-15 PROBLEM — N18.9 CHRONIC KIDNEY INSUFFICIENCY: Status: RESOLVED | Noted: 2017-12-11 | Resolved: 2018-06-15

## 2018-06-15 PROBLEM — R53.83 FATIGUE: Status: RESOLVED | Noted: 2017-12-11 | Resolved: 2018-06-15

## 2018-06-15 PROBLEM — F32.A DEPRESSION: Status: RESOLVED | Noted: 2017-12-11 | Resolved: 2018-06-15

## 2018-06-15 NOTE — MR AVS SNAPSHOT
[facility] 
 
 
 [address] [address] [phone] Patient: Glynn Jernigan MRN: [id number] FQB:[id number] Visit Information Date & Time Provider Department Dept. Phone Encounter #  
 6/15/2018  1:00 PM ADAL Colin MD [address] [place] [address] [facility] [phone] [id number] Follow-up Instructions Return in about 6 months (around 12/15/2018) for [address] [place] [address]. Your Appointments 12/19/2018 10:10 AM  
Any with ADAL Colin MD  
[place] [address] ([facility]) Appt Note: 6 mo follow up [address] [phone] [address] [facility] [address]  
  
    
 1/9/2019  9:00 AM  
DEXA with DEXA SCAN  
[facility] ([facility]) Appt Note: DEXA  appt with Dr Babar Jung @9:30  
 [address] [address] [address] [address]  
  
    
 1/9/2019  9:30 AM  
Follow Up with MD MICKI Peña [facility] ([facility]) Appt Note: 6 mo follow up [address] [phone] [address] [address] Upcoming Health Maintenance Date Due  
 FOOT EXAM Q1 9/6/1955 DTaP/Tdap/Td series (1 - Tdap) 9/6/1964 FOBT Q 1 YEAR AGE 50-75 9/6/1993 ZOSTER VACCINE AGE 60> 7/6/2003 Bone Densitometry (Dexa) Screening 9/6/2008 BREAST CANCER SCRN MAMMOGRAM 8/24/2011 HEMOGLOBIN A1C Q6M 6/13/2018 Influenza Age 5 to Adult 8/1/2018 MICROALBUMIN Q1 12/13/2018 LIPID PANEL Q1 12/13/2018 MEDICARE YEARLY EXAM 12/14/2018 EYE EXAM RETINAL OR DILATED Q1 4/11/2019 GLAUCOMA SCREENING Q2Y 4/11/2020 Allergies as of 6/15/2018  Review Complete On: 6/15/2018 By: Trent Andrew LPN  
  
 Severity Noted Reaction Type Reactions Diclofenac  12/11/2017    Unknown (comments) Current Immunizations  Reviewed on 12/13/2017 Name Date Influenza High Dose Vaccine PF 10/8/2017 Influenza Vaccine 11/28/2016, 10/8/2015, 10/16/2013 Pneumococcal Conjugate (PCV-13) 11/12/2015 Pneumococcal Polysaccharide (PPSV-23) 12/13/2017 Pneumococcal Vaccine (Unspecified Type) 9/27/2012 Not reviewed this visit You Were Diagnosed With   
  
 Codes Comments Dyslipidemia    -  Primary ICD-10-CM: E78.5 ICD-9-CM: 272.4 Hypertension, benign     ICD-10-CM: I10 
ICD-9-CM: 401.1 Osteoporosis screening     ICD-10-CM: Z13.820 ICD-9-CM: V82.81 Other specified disorders of bone density and structure, multiple sites     ICD-10-CM: M85.89 ICD-9-CM: 733.99 Vitals BP Pulse Temp Resp Height(growth percentile) Weight(growth percentile) 122/70 (BP 1 Location: Left arm, BP Patient Position: Sitting) 70 98.5 °F (36.9 °C) (Oral) 16 5' 3\" (1.6 m) 125 lb 6.4 oz (56.9 kg) SpO2 BMI OB Status Smoking Status 99% 22.21 kg/m2 Postmenopausal Former Smoker Vitals History BMI and BSA Data Body Mass Index Body Surface Area  
 22.21 kg/m 2 1.59 m 2 Preferred Pharmacy Pharmacy Name Phone [facility]/PHARMACY #[phone] [phone] [facility] [phone] Your Updated Medication List  
  
   
This list is accurate as of 6/15/18  2:11 PM.  Always use your most recent med list.  
  
  
  
  
 aspirin delayed-release 81 mg tablet Take  by mouth daily. atenolol 25 mg tablet Commonly known as:  TENORMIN Take 25 mg by mouth two (2) times a day. CALCIUM+D PO Take  by mouth. dorzolamide 2 % ophthalmic solution Commonly known as:  TRUSOPT Administer 2 Drops to both eyes three (3) times daily. VITAMIN C 250 mg tablet Generic drug:  ascorbic acid (vitamin C) Take  by mouth. Follow-up Instructions Return in about 6 months (around 12/15/2018) for Bessy Ambrosio To-Do List   
 07/15/2018 Imaging:  DEXA BONE DENSITY STUDY AXIAL Introducing [facility]! Radha Alonzo introduces [employer] patient portal. Now you can access parts of your medical record, email your doctor's office, and request medication refills online. 1. In your internet browser, go to [email or web address] 2. Click on the First Time User? Click Here link in the Sign In box. You will see the New Member Sign Up page. 3. Enter your Keenjar Access Code exactly as it appears below. You will not need to use this code after youve completed the sign-up process. If you do not sign up before the expiration date, you must request a new code. · Keenjar Access Code: [email or web address] Expires: 9/13/2018  1:44 PM 
 
4. Enter the last four digits of your Social Security Number (xxxx) and Date of Birth (mm/dd/yyyy) as indicated and click Submit. You will be taken to the next sign-up page. 5. Create a Keenjar ID. This will be your Keenjar login ID and cannot be changed, so think of one that is secure and easy to remember. 6. Create a Keenjar password. You can change your password at any time. 7. Enter your Password Reset Question and Answer. This can be used at a later time if you forget your password. 8. Enter your e-mail address. You will receive e-mail notification when new information is available in [address] 9. Click Sign Up. You can now view and download portions of your medical record. 10. Click the Download Summary menu link to download a portable copy of your medical information. If you have questions, please visit the Frequently Asked Questions section of the Keenjar website. Remember, Keenjar is NOT to be used for urgent needs. For medical emergencies, dial 911. Now available from your iPhone and Android! Please provide this summary of care documentation to your next provider. Your primary care clinician is listed as Francisca Padorn. If you have any questions after today's visit, please call [phone].

## 2018-06-15 NOTE — PROGRESS NOTES
Chief Complaint   Patient presents with    Hypertension     room 1       1. Have you been to the ER, urgent care clinic since your last visit? NO  Hospitalized since your last visit? NO    2. Have you seen or consulted any other health care providers outside of the [facility] since your last visit? Include any pap smears or colon screening. YES, DR. Christian Peacock (OPTHAMOLOGIST)      PT IS HERE FOR ROUTINE F/U.  PT UNSURE OF STRENGTH ON ATENOLOL 25MG OR 50MG?

## 2018-06-18 NOTE — PROGRESS NOTES
This note will not be viewable in [address] Nico Huber is a 74 y.o. female and presents with Hypertension (room 1)  . Subjective:  Mrs. Latia Mix presents today for follow-up of hypertension. She has a history of diabetes and renal insufficiency however on her most recent labs her creatinine was normal and her A1c was in the normal range she has previously had hyperlipidemia but is no longer on statin therapy owing to previous arthralgias and myalgias on statin. Her previous cholesterol was significantly elevated and we discussed her risk profile in relation to her history of hypertension. She denies any shortness of breath, chest pain, palpitations, PND, orthopnea, or pedal edema. Past Medical History:   Diagnosis Date    Cataract 12/11/2017    Chronic kidney insufficiency 12/11/2017    Depression 12/11/2017    Diabetes mellitus type 2, diet-controlled ([facility] [address] 12/11/2017    Dyslipidemia 12/11/2017    Fatigue 12/11/2017    GERD (gastroesophageal reflux disease)     Glaucoma 12/11/2017    Gout 12/11/2017    Hypertension     Hypertension, benign 12/11/2017    On statin therapy 12/11/2017    Pre-ulcerative corn or callous 12/11/2017    Right foot injury 12/11/2017     Past Surgical History:   Procedure Laterality Date    COLONOSCOPY N/A 1/6/2017    COLONOSCOPY performed by Merlyn Mendoza MD at [facility] ENDOSCOPY    HX HEENT      eye surgery    ND COLON CA SCRN NOT HI RSK IND  1/6/2017          Allergies   Allergen Reactions    Diclofenac Unknown (comments)     Current Outpatient Prescriptions   Medication Sig Dispense Refill    aspirin delayed-release 81 mg tablet Take  by mouth daily.  ascorbic acid, vitamin C, (VITAMIN C) 250 mg tablet Take  by mouth.  CALCIUM CARBONATE/VITAMIN D3 (CALCIUM+D PO) Take  by mouth.  dorzolamide (TRUSOPT) 2 % ophthalmic solution Administer 2 Drops to both eyes three (3) times daily.       atenolol (TENORMIN) 25 mg tablet Take 25 mg by mouth two (2) times a day. Social History     Social History    Marital status: UNKNOWN     Spouse name: N/A    Number of children: N/A    Years of education: N/A     Social History Main Topics    Smoking status: Former Smoker    Smokeless tobacco: Never Used    Alcohol use 0.6 oz/week     1 Glasses of wine per week    Drug use: No    Sexual activity: Not Asked     Other Topics Concern    None     Social History Narrative     No family history on file. Review of Systems  Constitutional:  negative for fevers, chills, anorexia and weight loss  Eyes:    negative for visual disturbance and irritation  ENT:    negative for tinnitus,sore throat,nasal congestion,ear pains. hoarseness  Respiratory:     negative for cough, hemoptysis, dyspnea,wheezing  CV:    negative for chest pain, palpitations, lower extremity edema  GI:    negative for nausea, vomiting, diarrhea, abdominal pain,melena  Endo:               negative for polyuria,polydipsia,polyphagia,heat intolerance  Genitourinary : negative for frequency, dysuria and hematuria  Integumentary: negative for rash and pruritus  Hematologic:   negative for easy bruising and gum/nose bleeding  Musculoskel:  negative for myalgias, arthralgias, back pain, muscle weakness, joint pain  Neurological:   negative for headaches, dizziness, vertigo, memory problems and gait   Behavl/Psych:  negative for feelings of anxiety, depression, mood changes  ROS otherwise negative      Objective:  Visit Vitals    /70 (BP 1 Location: Left arm, BP Patient Position: Sitting)    Pulse 70    Temp 98.5 °F (36.9 °C) (Oral)    Resp 16    Ht 5' 3\" (1.6 m)    Wt 125 lb 6.4 oz (56.9 kg)    SpO2 99%    BMI 22.21 kg/m2     Physical Exam:   General appearance - alert, well appearing, and in no distress  Mental status - alert, oriented to person, place, and time  EYE-TATYANA, EOMI, fundi normal, corneas normal, no foreign bodies  ENT-ENT exam normal, no neck nodes or sinus tenderness  Nose - normal and patent, no erythema, discharge or polyps  Mouth - mucous membranes moist, pharynx normal without lesions  Neck - supple, no significant adenopathy   Chest - clear to auscultation, no wheezes, rales or rhonchi, symmetric air entry   Heart - normal rate, regular rhythm, normal S1, S2, no murmurs, rubs, clicks or gallops   Abdomen - soft, nontender, nondistended, no masses or organomegaly  Lymph- no adenopathy palpable  Ext-peripheral pulses normal, no pedal edema, no clubbing or cyanosis  Skin-Warm and dry. no hyperpigmentation, vitiligo, or suspicious lesions  Neuro -alert, oriented, normal speech, no focal findings or movement disorder noted      Assessment/Plan:  Diagnoses and all orders for this visit:    1. Dyslipidemia    2. Hypertension, benign    3. Osteoporosis screening  -     DEXA BONE DENSITY STUDY AXIAL; Future    4. Other specified disorders of bone density and structure, multiple sites   -     DEXA BONE DENSITY STUDY AXIAL; Future          ICD-10-CM ICD-9-CM    1. Dyslipidemia E78.5 272.4    2. Hypertension, benign I10 401.1    3. Osteoporosis screening Z13.820 V82.81 DEXA BONE DENSITY STUDY AXIAL   4. Other specified disorders of bone density and structure, multiple sites  M85.89 733.99 DEXA BONE DENSITY STUDY AXIAL     Plan:    Patient will have a follow-up bone density study performed. She will continue current medical regimen for hypertension. Her renal function and glucose are normal and therefore these problems have been removed from her problem list.  Her dyslipidemia persist and we will need to further consider risk versus reward of treatment on follow-up visit. Follow-up Disposition:  Return in about 6 months (around 12/15/2018) for [address] I have reviewed with the patient details of the assessment and plan and all questions were answered. Relevent patient education was performed.  Verbal and/or written instructions (see AVS) provided. The most recent lab findings were reviewed with the patient. Plan was discussed with patient who verbally expressed understanding. An After Visit Summary was printed and given to the patient.     Tom Terrazas MD

## 2018-07-22 ENCOUNTER — APPOINTMENT (OUTPATIENT)
Dept: CT IMAGING | Age: 73
End: 2018-07-22
Attending: EMERGENCY MEDICINE
Payer: MEDICARE

## 2018-07-22 ENCOUNTER — APPOINTMENT (OUTPATIENT)
Dept: GENERAL RADIOLOGY | Age: 73
End: 2018-07-22
Attending: EMERGENCY MEDICINE
Payer: MEDICARE

## 2018-07-22 ENCOUNTER — HOSPITAL ENCOUNTER (EMERGENCY)
Age: 73
Discharge: HOME | End: 2018-07-23
Attending: EMERGENCY MEDICINE
Payer: MEDICARE

## 2018-07-22 DIAGNOSIS — S01.01XA LACERATION OF SCALP, INITIAL ENCOUNTER: ICD-10-CM
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
## 2018-07-22 DIAGNOSIS — S00.03XA SCALP HEMATOMA, INITIAL ENCOUNTER: ICD-10-CM

## 2018-07-22 DIAGNOSIS — S81.812A SKIN TEAR OF LEFT LOWER LEG WITHOUT COMPLICATION, INITIAL ENCOUNTER: ICD-10-CM

## 2018-07-22 DIAGNOSIS — S70.01XA CONTUSION OF RIGHT HIP, INITIAL ENCOUNTER: ICD-10-CM

## 2018-07-22 DIAGNOSIS — M25.551 PAIN OF RIGHT HIP JOINT: ICD-10-CM

## 2018-07-22 DIAGNOSIS — S51.811A SKIN TEAR OF RIGHT FOREARM WITHOUT COMPLICATION, INITIAL ENCOUNTER: ICD-10-CM

## 2018-07-22 PROCEDURE — 75810000293 HC SIMP/SUPERF WND  RPR

## 2018-07-22 PROCEDURE — 99284 EMERGENCY DEPT VISIT MOD MDM: CPT

## 2018-07-22 PROCEDURE — 77030039266 HC ADH SKN EXOFIN S2SG -A

## 2018-07-22 PROCEDURE — 77030008460 HC STPLR SKN PRECIS 3M -A

## 2018-07-22 PROCEDURE — 70450 CT HEAD/BRAIN W/O DYE: CPT

## 2018-07-22 PROCEDURE — 73521 X-RAY EXAM HIPS BI 2 VIEWS: CPT

## 2018-07-23 VITALS
HEIGHT: 63 IN | RESPIRATION RATE: 16 BRPM | BODY MASS INDEX: 21.79 KG/M2 | DIASTOLIC BLOOD PRESSURE: 108 MMHG | SYSTOLIC BLOOD PRESSURE: 194 MMHG | OXYGEN SATURATION: 99 % | WEIGHT: 123 LBS | TEMPERATURE: 98.8 F | HEART RATE: 71 BPM

## 2018-07-23 PROCEDURE — 90471 IMMUNIZATION ADMIN: CPT

## 2018-07-23 PROCEDURE — 90715 TDAP VACCINE 7 YRS/> IM: CPT | Performed by: EMERGENCY MEDICINE

## 2018-07-23 PROCEDURE — 74011250637 HC RX REV CODE- 250/637: Performed by: EMERGENCY MEDICINE

## 2018-07-23 PROCEDURE — 75810000293 HC SIMP/SUPERF WND  RPR

## 2018-07-23 PROCEDURE — 74011250636 HC RX REV CODE- 250/636: Performed by: EMERGENCY MEDICINE

## 2018-07-23 RX ORDER — OXYCODONE AND ACETAMINOPHEN 5; 325 MG/1; MG/1
2 TABLET ORAL
Status: COMPLETED | OUTPATIENT
Start: 2018-07-23 | End: 2018-07-23

## 2018-07-23 RX ADMIN — OXYCODONE HYDROCHLORIDE AND ACETAMINOPHEN 2 TABLET: 5; 325 TABLET ORAL at 00:26

## 2018-07-23 RX ADMIN — TETANUS TOXOID, REDUCED DIPHTHERIA TOXOID AND ACELLULAR PERTUSSIS VACCINE, ADSORBED 0.5 ML: 5; 2.5; 8; 8; 2.5 SUSPENSION INTRAMUSCULAR at 01:44

## 2018-07-23 NOTE — ED NOTES
Patient discharged by Neel Cook MD. Patient provided with discharge instructions Rx and instructions on follow up care. Patient out of ED via wheelchair accompanied by daughter.

## 2018-07-23 NOTE — ED PROVIDER NOTES
EMERGENCY DEPARTMENT HISTORY AND PHYSICAL EXAM  
     
 
Date: 7/22/2018 Patient Name: Tiffany Chapman History of Presenting Illness Chief Complaint Patient presents with  Fall  
  patient fell up three stairs, and then fell down three stairs. hematoma with bleeding to head, skin tear right forearm, and left shin; denies LOC. reports taking 81mg ASA daily History Provided By: Patient HPI: Tiffany Chapman is a 74 y.o. female, pmhx HTN, GERD, glaucoma, who presents herself w/ daughter to the ED for evaluation of injuries sustained from fall that occurred today (7/22/18). Pt reports of falling backwards from the 3rd step on a flight stairs. She reports of hitting her head on a table but denies any LOC. Pt is here at ED w/ wounds to right forearm, right shin, and scalp. She reports of constant, aching right hip pain and HA since onset of fall. Her daughter notes that after the fall, her mother tried to get up and walk to the car only to fall again. Pt was unable to get back up the 2nd time until EMS arrived. Pt is currently on 81mg of ASA. She specifically denies any recent fevers, chills, nausea, vomiting, diarrhea, abd pain, CP, SOB, urinary sxs, or changes in BM. Era Tatum PCP: Marleen Ortega MD 
 
Allergies: diclofenac PMHx: Significant for HTN, GERD, glaucoma PSHx: Significant for eye surgery Social Hx: -tobacco (former), +EtOH (rarely), -Illicit Drugs There are no other complaints, changes, or physical findings at this time. Current Outpatient Prescriptions Medication Sig Dispense Refill  aspirin delayed-release 81 mg tablet Take  by mouth daily.  ascorbic acid, vitamin C, (VITAMIN C) 250 mg tablet Take  by mouth.  CALCIUM CARBONATE/VITAMIN D3 (CALCIUM+D PO) Take  by mouth.  atenolol (TENORMIN) 25 mg tablet Take 25 mg by mouth two (2) times a day. Past History Past Medical History: 
Past Medical History:  
Diagnosis Date  Cataract 12/11/2017  Chronic kidney insufficiency 12/11/2017  Depression 12/11/2017  Dyslipidemia 12/11/2017  Fatigue 12/11/2017  GERD (gastroesophageal reflux disease)  Glaucoma 12/11/2017  Gout 12/11/2017  Hypertension  Hypertension, benign 12/11/2017  On statin therapy 12/11/2017  Pre-ulcerative corn or callous 12/11/2017  Right foot injury 12/11/2017 Past Surgical History: 
Past Surgical History:  
Procedure Laterality Date  COLONOSCOPY N/A 1/6/2017 COLONOSCOPY performed by Maritza Marquez MD at [facility] ENDOSCOPY  
 HX HEENT    
 eye surgery  ME COLON CA SCRN NOT HI RSK IND  1/6/2017 Family History: 
History reviewed. No pertinent family history. Social History: 
Social History Substance Use Topics  Smoking status: Former Smoker  Smokeless tobacco: Never Used  Alcohol use 0.6 oz/week 1 Glasses of wine per week Allergies: Allergies Allergen Reactions  Diclofenac Unknown (comments) Patient reports she is not allergic Review of Systems Review of Systems Constitutional: Negative. Negative for fever. Eyes: Negative. Respiratory: Negative. Negative for shortness of breath. Cardiovascular: Negative for chest pain. Gastrointestinal: Negative for abdominal pain, nausea and vomiting. Endocrine: Negative. Genitourinary: Negative. Negative for difficulty urinating, dysuria and hematuria. Musculoskeletal: Positive for arthralgias (right hip). Skin: Positive for wound (right forearm, right shin, scalp). Allergic/Immunologic: Negative. Neurological: Positive for numbness (right leg) and headaches. Negative for syncope. Psychiatric/Behavioral: Negative for suicidal ideas. All other systems reviewed and are negative. Physical Exam  
Physical Exam  
Constitutional: She is oriented to person, place, and time. She appears well-developed and well-nourished. No distress. HENT:  
Head: Normocephalic. Head is with contusion and with laceration. Nose: Nose normal.  
Eyes: Conjunctivae and EOM are normal. Pupils are equal, round, and reactive to light. No scleral icterus. Neck: Normal range of motion. Neck supple. No spinous process tenderness and no muscular tenderness present. No tracheal deviation and normal range of motion present. Cardiovascular: Normal rate, regular rhythm, normal heart sounds and intact distal pulses. Exam reveals no friction rub. No murmur heard. Pulmonary/Chest: Effort normal and breath sounds normal. No stridor. No respiratory distress. She has no wheezes. She has no rales. Abdominal: Soft. Bowel sounds are normal. She exhibits no distension. There is no tenderness. There is no rebound. Musculoskeletal: Normal range of motion. Right hip: She exhibits tenderness and swelling. Legs: 
Neurological: She is alert and oriented to person, place, and time. No cranial nerve deficit. Skin: Skin is warm and dry. Laceration (skin tear to L lateral shin, R forearm) noted. No rash noted. She is not diaphoretic. Psychiatric: She has a normal mood and affect. Her speech is normal and behavior is normal. Judgment and thought content normal. Cognition and memory are normal.  
Nursing note and vitals reviewed. Diagnostic Study Results Labs - No results found for this or any previous visit (from the past 12 hour(s)). Radiologic Studies -  
XR HIPS BI W AP PELV Final Result CT HEAD WO CONT Final Result CT Results  (Last 48 hours) 07/22/18 6754  CT HEAD WO CONT Final result Impression:  IMPRESSION:   
1. No evidence of acute intracranial abnormality by this modality. 2. Scalp hematoma Narrative:  EXAM:  CT HEAD WO CONT INDICATION:   Head trauma, closed, mod-severe; fall down stairs COMPARISON: CT of the head, 3/18/2009. Tosin Montoya TECHNIQUE:   
Unenhanced CT of the head was performed using 5 mm images. Coronal and sagittal  
reformats were produced. Brain and bone windows were generated. CT dose reduction was achieved through use of a standardized protocol tailored  
for this examination and automatic exposure control for dose modulation. Candance Huger FINDINGS:  
The ventricles and sulci are normal in size, shape and configuration and  
midline. There is no significant white matter disease. There is no intracranial  
hemorrhage, extra-axial collection, mass, mass effect or midline shift. The  
basilar cisterns are open. No acute infarct is identified. The bone windows  
demonstrate hyperostosis frontalis. . The visualized portions of the paranasal  
sinuses and mastoid air cells are clear. Likely hematoma and the vertex scalp Candance Huger CXR Results  (Last 48 hours) None Medical Decision Making I am the first provider for this patient. I reviewed the vital signs, available nursing notes, past medical history, past surgical history, family history and social history. Vital Signs-Reviewed the patient's vital signs. Patient Vitals for the past 12 hrs: 
 Temp Pulse Resp BP SpO2  
07/23/18 0030 - 71 - (!) 194/108 99 % 07/23/18 0015 - 68 - (!) 155/105 98 %  
07/22/18 2135 98.8 °F (37.1 °C) 73 16 95/79 98 % Pulse Oximetry Analysis - 99% on RA Cardiac Monitor:  
Rate: 73 bpm 
Rhythm: Normal Sinus Rhythm Records Reviewed: Nursing Notes and Old Medical Records Provider Notes (Medical Decision Making): DDX: 
Hip fx, ich, scalp hematoma/laceration, skin tears, contusions Plan: 
Head ct, hip xray, analgesic, wound cleaning and skin tear repair, scalp laceration repair Impression: 
Scalp laceration, skin tears, contusions ED Course:  
Initial assessment performed. The patients presenting problems have been discussed, and they are in agreement with the care plan formulated and outlined with them.   I have encouraged them to ask questions as they arise throughout their visit. 
 
I reviewed our electronic medical record system for any past medical records that were available that may contribute to the patients current condition, the nursing notes and and vital signs from today's visit Nursing notes will be reviewed as they become available in realtime while the pt has been in the ED. Stefania Casiano MD 
 
I personally reviewed pt's imaging. Official read by radiology listed below. Stefania Casiano MD 
 
Wound Repair: skin adhesive Location: left anterior shin         Length: 7   cm. Depth: superficial.  
Shape:irregular Contaminated: none. Anesthesia: none. Repair: simple Wound Repair: skin adhesive Location: right forearm           Length: 8   cm. Depth: superficial.  
Shape:irregular Contaminated: none. Anesthesia: none. Repair: simple Wound Repair: staples (s) Location: scalp          Length: 3   cm. Depth: dermal layer Shape:linear Contaminated: none. Anesthesia: none. Repair: simple, 2 staples. PROGRESS NOTE: 
2:05 AM 
Pt noted to be feeling better, ready for discharge. Discussed lab and imaging findings with pt and/or family, specifically noting lacerations but no fractures or ICH. Pt will follow up as instructed. All questions have been answered, pt voiced understanding and agreement with plan. If narcotics were prescribed, pt was advised not to drive or operate heavy machinery. If abx were prescribed, pt advised that diarrhea and rash are possible side effects of the medications. Specific return precautions provided in addition to instructions for pt to return to the ED immediately should sx worsen at any time. Written by Aurea Dorantes ED Scribe, as dictated by Stefania Casiano MD 
 
 
Critical Care Time:  
 
none Diagnosis Clinical Impression: 1. Fall, initial encounter 2. Scalp hematoma, initial encounter 3. Laceration of scalp, initial encounter 4.  Skin tear of right forearm without complication, initial encounter 5. Skin tear of left lower leg without complication, initial encounter 6. Pain of right hip joint 7. Contusion of right hip, initial encounter PLAN: 
1. Discharge Medication List as of 7/23/2018  2:06 AM  
  
 
2. Follow-up Information Follow up With Details Comments Contact Info Joe Castillo MD Schedule an appointment as soon as possible for a visit in 2 days  [address] [facility] 
[phone] Return to ED if worse Disposition: 
 
Discharge Note: 
2:10 AM 
The pt is ready for discharge. The pt's signs, symptoms, diagnosis, and discharge instructions have been discussed and pt has conveyed their understanding. The pt is to follow up as recommended or return to ER should their symptoms worsen. Plan has been discussed and pt is in agreement. Attestations: This note is prepared by Gatito Allen, acting as Scribe for MD Sherrell Yang MD : The scribe's documentation has been prepared under my direction and personally reviewed by me in its entirety. I confirm that the note above accurately reflects all work, treatment, procedures, and medical decision making performed by me. This note will not be viewable in [address]

## 2018-07-23 NOTE — DISCHARGE INSTRUCTIONS
Contusion: Care Instructions  Your Care Instructions  Contusion is the medical term for a bruise. It is the result of a direct blow or an impact, such as a fall. Contusions are common sports injuries. Most people think of a bruise as a black-and-blue spot. This happens when small blood vessels get torn and leak blood under the skin. But bones, muscles, and organs can also get bruised. This may damage deep tissues but not cause a bruise you can see. The doctor will do a physical exam to find the location of your contusion. You may also have tests to make sure you do not have a more serious injury, such as a broken bone or nerve damage. These may include X-rays or other imaging tests like a CT scan or MRI. Deep-tissue contusions may cause pain and swelling. But if there is no serious damage, they will often get better in a few weeks with home treatment. The doctor has checked you carefully, but problems can develop later. If you notice any problems or new symptoms, get medical treatment right away. Follow-up care is a key part of your treatment and safety. Be sure to make and go to all appointments, and call your doctor if you are having problems. It's also a good idea to know your test results and keep a list of the medicines you take. How can you care for yourself at home? · Put ice or a cold pack on the sore area for 10 to 20 minutes at a time to stop swelling. Put a thin cloth between the ice pack and your skin. · Be safe with medicines. Read and follow all instructions on the label. ¨ If the doctor gave you a prescription medicine for pain, take it as prescribed. ¨ If you are not taking a prescription pain medicine, ask your doctor if you can take an over-the-counter medicine. · If you can, prop up the sore area on pillows as much as possible for the next few days. Try to keep the sore area above the level of your heart. When should you call for help?   Call your doctor now or seek immediate medical care if:  · Your pain gets worse. · You have new or worse swelling. · You have tingling, weakness, or numbness in the area near the contusion. · The area near the contusion is cold or pale. Watch closely for changes in your health, and be sure to contact your doctor if:  · You do not get better as expected. Where can you learn more? Go to [employer]be  Enter [id number] in the search box to learn more about \"Contusion: Care Instructions. \"   © 7857-4285 [employer]. Care instructions adapted under license by [employer] (which disclaims liability or warranty for this information). This care instruction is for use with your licensed healthcare professional. If you have questions about a medical condition or this instruction, always ask your healthcare professional. [address] any warranty or liability for your use of this information. Content Version: 29.0.366182; Current as of: May 22, 2015             Bruises: Care Instructions  Your Care Instructions    Bruises occur when small blood vessels under the skin tear or rupture, most often from a twist, bump, or fall. Blood leaks into tissues under the skin and causes a black-and-blue spot that often turns colors, including purplish black, reddish blue, or yellowish green, as the bruise heals. Bruises hurt, but most are not serious and will go away on their own within 2 to 4 weeks. Sometimes, gravity causes them to spread down the body. A leg bruise usually will take longer to heal than a bruise on the face or arms. Follow-up care is a key part of your treatment and safety. Be sure to make and go to all appointments, and call your doctor if you are having problems. It's also a good idea to know your test results and keep a list of the medicines you take. How can you care for yourself at home? · Take pain medicines exactly as directed.   ¨ If the doctor gave you a prescription medicine for pain, take it as prescribed. ¨ If you are not taking a prescription pain medicine, ask your doctor if you can take an over-the-counter medicine. · Put ice or a cold pack on the area for 10 to 20 minutes at a time. Put a thin cloth between the ice and your skin. · If you can, prop up the bruised area on pillows as much as possible for the next few days. Try to keep the bruise above the level of your heart. When should you call for help? Call your doctor now or seek immediate medical care if:    · You have signs of infection, such as:  ¨ Increased pain, swelling, warmth, or redness. ¨ Red streaks leading from the bruise. ¨ Pus draining from the bruise. ¨ A fever.     · You have a bruise on your leg and signs of a blood clot, such as:  ¨ Increasing redness and swelling along with warmth, tenderness, and pain in the bruised area. ¨ Pain in your calf, back of the knee, thigh, or groin. ¨ Redness and swelling in your leg or groin.     · Your pain gets worse.    Watch closely for changes in your health, and be sure to contact your doctor if:    · You do not get better as expected. Where can you learn more? Go to http://juvenal-jaqui.info/. Enter [phone] [id number] in the search box to learn more about \"Bruises: Care Instructions. \"  Current as of: November 20, 2017  Content Version: 11.7  © 8032-6279 [employer]. Care instructions adapted under license by [employer] (which disclaims liability or warranty for this information). If you have questions about a medical condition or this instruction, always ask your healthcare professional. [place] [address] any warranty or liability for your use of this information. Preventing Falls: Care Instructions  Your Care Instructions    Getting around your home safely can be a challenge if you have injuries or health problems that make it easy for you to fall.  Loose rugs and furniture in walkways are among the dangers for many older people who have problems walking or who have poor eyesight. People who have conditions such as arthritis, osteoporosis, or dementia also have to be careful not to fall. You can make your home safer with a few simple measures. Follow-up care is a key part of your treatment and safety. Be sure to make and go to all appointments, and call your doctor if you are having problems. It's also a good idea to know your test results and keep a list of the medicines you take. How can you care for yourself at home? Taking care of yourself  · You may get dizzy if you do not drink enough water. To prevent dehydration, drink plenty of fluids, enough so that your urine is light yellow or clear like water. Choose water and other caffeine-free clear liquids. If you have kidney, heart, or liver disease and have to limit fluids, talk with your doctor before you increase the amount of fluids you drink. · Exercise regularly to improve your strength, muscle tone, and balance. Walk if you can. Swimming may be a good choice if you cannot walk easily. · Have your vision and hearing checked each year or any time you notice a change. If you have trouble seeing and hearing, you might not be able to avoid objects and could lose your balance. · Know the side effects of the medicines you take. Ask your doctor or pharmacist whether the medicines you take can affect your balance. Sleeping pills or sedatives can affect your balance. · Limit the amount of alcohol you drink. Alcohol can impair your balance and other senses. · Ask your doctor whether calluses or corns on your feet need to be removed. If you wear loose-fitting shoes because of calluses or corns, you can lose your balance and fall. · Talk to your doctor if you have numbness in your feet. Preventing falls at home  · Remove raised doorway thresholds, throw rugs, and clutter. Repair loose carpet or raised areas in the floor.   · Move furniture and electrical cords to keep them out of walking paths. · Use nonskid floor wax, and wipe up spills right away, especially on ceramic tile floors. · If you use a walker or cane, put rubber tips on it. If you use crutches, clean the bottoms of them regularly with an abrasive pad, such as steel wool. · Keep your house well lit, especially [facility], and outside walkways. Use night-lights in areas such as hallways and bathrooms. Add extra light switches or use remote switches (such as switches that go on or off when you clap your hands) to make it easier to turn lights on if you have to get up during the night. · Install sturdy handrails on stairways. · Move items in your cabinets so that the things you use a lot are on the lower shelves (about waist level). · Keep a cordless phone and a flashlight with new batteries by your bed. If possible, put a phone in each of the main rooms of your house, or carry a cell phone in case you fall and cannot reach a phone. Or, you can wear a device around your neck or wrist. You push a button that sends a signal for help. · Wear low-heeled shoes that fit well and give your feet good support. Use footwear with nonskid soles. Check the heels and soles of your shoes for wear. Repair or replace worn heels or soles. · Do not wear socks without shoes on wood floors. · Walk on the grass when the sidewalks are slippery. If you live in an area that gets snow and ice in the winter, sprinkle salt on slippery steps and sidewalks. Preventing falls in the bath  · Install grab bars and nonskid mats inside and outside your shower or tub and near the toilet and sinks. · Use shower chairs and bath benches. · Use a hand-held shower head that will allow you to sit while showering.   · Get into a tub or shower by putting the weaker leg in first. Get out of a tub or shower with your strong side first.  · Repair loose toilet seats and consider installing a raised toilet seat to make getting on and off the toilet easier. · Keep your bathroom door unlocked while you are in the shower. Where can you learn more? Go to http://juvenal-jaqui.info/. Enter [phone] [id number] [phone] in the search box to learn more about \"Preventing Falls: Care Instructions. \"  Current as of: May 12, 2017  Content Version: 11.7  © 8358-5835 [employer]. Care instructions adapted under license by [employer] (which disclaims liability or warranty for this information). If you have questions about a medical condition or this instruction, always ask your healthcare professional. [place] [address] any warranty or liability for your use of this information. Head Injury: Care Instructions  Your Care Instructions    Most injuries to the head are minor. Bumps, cuts, and scrapes on the head and face usually heal well and can be treated the same as injuries to other parts of the body. Although it's rare, once in a while a more serious problem shows up after you are home. So it's good to be on the lookout for symptoms for a day or two. Follow-up care is a key part of your treatment and safety. Be sure to make and go to all appointments, and call your doctor if you are having problems. It's also a good idea to know your test results and keep a list of the medicines you take. How can you care for yourself at home? · Follow your doctor's instructions. He or she will tell you if you need someone to watch you closely for the next 24 hours or longer. · Take it easy for the next few days or more if you are not feeling well. · Ask your doctor when it's okay for you to go back to activities like driving a car, riding a bike, or operating machinery. When should you call for help? Call 911 anytime you think you may need emergency care.  For example, call if:    · You have a seizure.     · You passed out (lost consciousness).     · You are confused or can't stay awake.    Call your doctor now or seek immediate medical care if:    · You have new or worse vomiting.     · You feel less alert.     · You have new weakness or numbness in any part of your body.    Watch closely for changes in your health, and be sure to contact your doctor if:    · You do not get better as expected.     · You have new symptoms, such as headaches, trouble concentrating, or changes in mood. Where can you learn more? Go to http://juvenal-jaqui.info/. Enter Y296 in the search box to learn more about \"Head Injury: Care Instructions. \"  Current as of: October 9, 2017  Content Version: 11.7  © 7177-9692 [employer]. Care instructions adapted under license by [employer] (which disclaims liability or warranty for this information). If you have questions about a medical condition or this instruction, always ask your healthcare professional. [address] any warranty or liability for your use of this information. Hip Pain: Care Instructions  Your Care Instructions    Hip pain may be caused by many things, including overuse, a fall, or a twisting movement. Another cause of hip pain is arthritis. Your pain may increase when you stand up, walk, or squat. The pain may come and go or may be constant. Home treatment can help relieve hip pain, swelling, and stiffness. If your pain is ongoing, you may need more tests and treatment. Follow-up care is a key part of your treatment and safety. Be sure to make and go to all appointments, and call your doctor if you are having problems. It's also a good idea to know your test results and keep a list of the medicines you take. How can you care for yourself at home? · Take pain medicines exactly as directed. ¨ If the doctor gave you a prescription medicine for pain, take it as prescribed. ¨ If you are not taking a prescription pain medicine, ask your doctor if you can take an over-the-counter medicine. · Rest and protect your hip.  Take a break from any activity, including standing or walking, that may cause pain. · Put ice or a cold pack against your hip for 10 to 20 minutes at a time. Try to do this every 1 to 2 hours for the next 3 days (when you are awake) or until the swelling goes down. Put a thin cloth between the ice and your skin. · Sleep on your healthy side with a pillow between your knees, or sleep on your back with pillows under your knees. · If there is no swelling, you can put moist heat, a heating pad, or a warm cloth on your hip. Do gentle stretching exercises to help keep your hip flexible. · Learn how to prevent falls. Have your vision and hearing checked regularly. Wear slippers or shoes with a nonskid sole. · Stay at a healthy weight. · Wear comfortable shoes. When should you call for help? Call 911 anytime you think you may need emergency care. For example, call if:    · You have sudden chest pain and shortness of breath, or you cough up blood.     · You are not able to stand or walk or bear weight.     · Your buttocks, legs, or feet feel numb or tingly.     · Your leg or foot is cool or pale or changes color.     · You have severe pain.    Call your doctor now or seek immediate medical care if:    · You have signs of infection, such as:  ¨ Increased pain, swelling, warmth, or redness in the hip area. ¨ Red streaks leading from the hip area. ¨ Pus draining from the hip area. ¨ A fever.     · You have signs of a blood clot, such as:  ¨ Pain in your calf, back of the knee, thigh, or groin. ¨ Redness and swelling in your leg or groin.     · You are not able to bend, straighten, or move your leg normally.     · You have trouble urinating or having bowel movements.    Watch closely for changes in your health, and be sure to contact your doctor if:    · You do not get better as expected. Where can you learn more? Go to http://juvenal-jaqui.info/.   Enter T509 in the search box to learn more about \"Hip Pain: Care Instructions. \"  Current as of: November 20, 2017  Content Version: 11.7  © 0080-8369 [employer]. Care instructions adapted under license by [employer] (which disclaims liability or warranty for this information). If you have questions about a medical condition or this instruction, always ask your healthcare professional. [address] any warranty or liability for your use of this information. Cuts Closed With Adhesives: Care Instructions  Your Care Instructions  A cut can happen anywhere on your body. The doctor used an adhesive to close the cut. When the adhesive dries, it forms a film that holds the edges of the cut together. Skin adhesives are sometimes called liquid stitches. If the cut went deep and through the skin, the doctor may have put in a layer of stitches below the adhesive. The deeper layer of stitches brings the deep part of the cut together. These stitches will dissolve and don't need to be removed. You don't see the stitches, only the adhesive. You may have a bandage. The doctor has checked you carefully, but problems can develop later. If you notice any problems or new symptoms, get medical treatment right away. Follow-up care is a key part of your treatment and safety. Be sure to make and go to all appointments, and call your doctor if you are having problems. It's also a good idea to know your test results and keep a list of the medicines you take. How can you care for yourself at home? · Keep the cut dry for the first 24 to 48 hours. After this, you can shower if your doctor okays it. Pat the cut dry. · Don't soak the cut, such as in a bathtub. Your doctor will tell you when it's safe to get the cut wet. · If your doctor told you how to care for your cut, follow your doctor's instructions. If you did not get instructions, follow this general advice:  ¨ Do not put any kind of ointment, cream, or lotion over the area.  This can make the adhesive fall off too soon. ¨ After the first 24 to 48 hours, wash around the cut with clean water 2 times a day. Do not use hydrogen peroxide or alcohol, which can slow healing. ¨ If the doctor told you to use a bandage, put on a new bandage after cleaning the cut or if the bandage gets wet or dirty. · Prop up the sore area on a pillow anytime you sit or lie down during the next 3 days. Try to keep it above the level of your heart. This will help reduce swelling. · Leave the skin adhesive on your skin until it falls off on its own. This may take 5 to 10 days. · Do not scratch, rub, or pick at the adhesive. · Do not put the sticky part of a bandage directly on the adhesive. · Avoid any activity that could cause your cut to reopen. · Be safe with medicines. Read and follow all instructions on the label. ¨ If the doctor gave you a prescription medicine for pain, take it as prescribed. ¨ If you are not taking a prescription pain medicine, ask your doctor if you can take an over-the-counter medicine. When should you call for help? Call your doctor now or seek immediate medical care if:    · You have new pain, or your pain gets worse.     · The skin near the cut is cold or pale or changes color.     · You have tingling, weakness, or numbness near the cut.     · The cut starts to bleed.     · You have trouble moving the area near the cut.     · You have symptoms of infection, such as:  ¨ Increased pain, swelling, warmth, or redness around the cut. ¨ Red streaks leading from the cut. ¨ Pus draining from the cut. ¨ A fever.    Watch closely for changes in your health, and be sure to contact your doctor if:    · The cut reopens.     · You do not get better as expected. Where can you learn more? Go to http://juvenal-jaqui.info/. Enter P174 in the search box to learn more about \"Cuts Closed With Adhesives: Care Instructions. \"  Current as of: November 20, 2017  Content Version: 11.7  © 8472-5772 [employer]. Care instructions adapted under license by [employer] (which disclaims liability or warranty for this information). If you have questions about a medical condition or this instruction, always ask your healthcare professional. [address] any warranty or liability for your use of this information. Skin Tears: Care Instructions  Your Care Instructions  As we get older, our skin gets drier and more fragile. Sometimes this can cause the outer layers of skin to split and tear open. Skin tears are treated in different ways. In some cases, doctors use pieces of tape called Steri-Strips to pull the skin together and help it heal. Other times, it's best to leave the tear open and cover it with a special wound-care bandage. Skin tears are usually not serious. They usually heal in a few weeks. But how long you take to heal depends on your body and the type of tear you have. Sometimes the torn piece of skin is used to protect the wound while it heals. But that piece of skin does not heal. It may fall off on its own. Or the doctor may remove it. As your tear heals, it's important to keep it clean to help prevent infection. The doctor has checked you carefully, but problems can develop later. If you notice any problems or new symptoms, get medical treatment right away. Follow-up care is a key part of your treatment and safety. Be sure to make and go to all appointments, and call your doctor if you are having problems. It's also a good idea to know your test results and keep a list of the medicines you take. How can you care for yourself at home? · If you have pain, ask your doctor if you can take an over-the-counter pain medicine, such as acetaminophen (Tylenol), ibuprofen (Advil, Motrin), or naproxen (Aleve). Be safe with medicines. Read and follow all instructions on the label.   · If you have a bandage, follow your doctor's instructions for changing it. · If you have Steri-Strips, leave them on until they fall off. · Follow your doctor's instructions about bathing. · Gently wash the skin tear with plain water 2 times a day. Do not rub the area. · Let the area air dry. Or you can pat it carefully with a soft towel. When should you call for help? Call your doctor now or seek immediate medical care if:  · You have signs of infection, such as:  ¨ Increased pain, swelling, warmth, or redness around the tear. ¨ Red streaks leading from the tear. ¨ Pus draining from the tear. ¨ A fever. · The tear starts to bleed a lot. Small amounts of blood are normal.  Watch closely for changes in your health, and be sure to contact your doctor if:  · You do not get better as expected. Where can you learn more? Go to [employer].be  Enter T565 in the search box to learn more about \"Skin Tears: Care Instructions. \"   © 0723-9660 [employer]. Care instructions adapted under license by [employer] (which disclaims liability or warranty for this information). This care instruction is for use with your licensed healthcare professional. If you have questions about a medical condition or this instruction, always ask your healthcare professional. [place] [address] any warranty or liability for your use of this information.   Content Version: 45.1.287296; Current as of: May 22, 2015

## 2018-07-23 NOTE — ED TRIAGE NOTES
Pt arrive via wheelchair to rm 22. reports falling backwards, from the 3rd step on a flight of stairs, hit head on table, small skin tear on left shin 3 cm, small skin tear on R forearm 3 cm all bleeding is controlled. Pt with hematoma on right hip, and pain on palpation of left glute Pt had second fall after family arrived to the house and called EMS, daughter states pt was confused after 2nd fall, currently pt is a/0x4. pt denies LOC. Pt notes having numbness in L leg x1week, pt states, \"my knee gave out tonight\". Pt resting comfortably with family at bedside.

## 2018-07-25 ENCOUNTER — OFFICE VISIT (OUTPATIENT)
Dept: INTERNAL MEDICINE CLINIC | Age: 73
End: 2018-07-25

## 2018-07-25 VITALS
RESPIRATION RATE: 16 BRPM | HEIGHT: 63 IN | WEIGHT: 125.4 LBS | BODY MASS INDEX: 22.22 KG/M2 | SYSTOLIC BLOOD PRESSURE: 126 MMHG | TEMPERATURE: 98.9 F | HEART RATE: 76 BPM | DIASTOLIC BLOOD PRESSURE: 84 MMHG | OXYGEN SATURATION: 99 %

## 2018-07-25 DIAGNOSIS — S51.011D SKIN TEAR OF RIGHT ELBOW WITHOUT COMPLICATION, SUBSEQUENT ENCOUNTER: ICD-10-CM

## 2018-07-25 DIAGNOSIS — M54.32 SCIATICA OF LEFT SIDE: ICD-10-CM

## 2018-07-25 DIAGNOSIS — S01.01XD LACERATION OF SCALP, SUBSEQUENT ENCOUNTER: Primary | ICD-10-CM

## 2018-07-25 DIAGNOSIS — S81.812D SKIN TEAR OF LEFT LOWER LEG WITHOUT COMPLICATION, SUBSEQUENT ENCOUNTER: ICD-10-CM

## 2018-07-25 RX ORDER — PREDNISONE 10 MG/1
10 TABLET ORAL SEE ADMIN INSTRUCTIONS
Qty: 21 TAB | Refills: 0 | Status: SHIPPED | OUTPATIENT
Start: 2018-07-25 | End: 2018-07-25 | Stop reason: SDUPTHER

## 2018-07-25 RX ORDER — PREDNISONE 10 MG/1
10 TABLET ORAL SEE ADMIN INSTRUCTIONS
Qty: 21 TAB | Refills: 0 | Status: SHIPPED | OUTPATIENT
Start: 2018-07-25 | End: 2019-01-09 | Stop reason: ALTCHOICE

## 2018-07-25 NOTE — PROGRESS NOTES
Chief Complaint   Patient presents with   [facility] Follow Up     room 1     1. Have you been to the ER, urgent care clinic since your last visit? Hospitalized since your last visit? YES, [facility] ER VISIT    2. Have you seen or consulted any other health care providers outside of the [address] [place] [address] since your last visit? Include any pap smears or colon screening. NO      PT IS HERE FOR ER F/U. PT HAD FALLEN AT HOME. PT HAD INJURY TO LEFT LOWER LEG, RIGHT ELBOW AND BACK OF HEAD. PT HAS 2 LAURY IN HER HEAD. PT STATES SHE HAS HAD NUMBNESS OF HER LEFT LEG PRIOR TO HER FALL.

## 2018-07-25 NOTE — PROGRESS NOTES
This note will not be viewable in [address] Berry Peralta is a 74 y.o. female and presents with Hospital Follow Up (room 1)  . Subjective:  Mrs. Jose Monson presents today for transition of care follow-up after being seen in the emergency room at [facility] [address] on Sunday, 22 July. She sustained a fall because of weakness of her left leg resulting in a hematoma and laceration of her scalp, skin tear of her right forearm, skin tear of her left leg. She has 2 staples in the laceration on her scalp which will need to be removed next week. She continues to have left leg weakness and has difficulty getting up and down steps. She states that her leg became weak after doing a lot of activity but she denies any obvious trauma or weakness prior to her fall. She did have some pain radiating down to her left leg prior to her fall. This is resolved and has not returned. She states that she had fallen years ago and fractured her tailbone. Past Medical History:   Diagnosis Date    Cataract 12/11/2017    Chronic kidney insufficiency 12/11/2017    Depression 12/11/2017    Dyslipidemia 12/11/2017    Fatigue 12/11/2017    GERD (gastroesophageal reflux disease)     Glaucoma 12/11/2017    Gout 12/11/2017    Hypertension     Hypertension, benign 12/11/2017    On statin therapy 12/11/2017    Pre-ulcerative corn or callous 12/11/2017    Right foot injury 12/11/2017     Past Surgical History:   Procedure Laterality Date    COLONOSCOPY N/A 1/6/2017    COLONOSCOPY performed by Josie Hall MD at [facility] ENDOSCOPY    HX HEENT      eye surgery    [place] COLON CA SCRN NOT HI RSK IND  1/6/2017          Allergies   Allergen Reactions    Diclofenac Unknown (comments)     Patient reports she is not allergic     Current Outpatient Prescriptions   Medication Sig Dispense Refill    predniSONE (STERAPRED DS) 10 mg dose pack Take 1 Tab by mouth See Admin Instructions.  See administration instruction per 10mg dose pack 21 Tab 0    aspirin delayed-release 81 mg tablet Take  by mouth daily.  ascorbic acid, vitamin C, (VITAMIN C) 250 mg tablet Take  by mouth.  CALCIUM CARBONATE/VITAMIN D3 (CALCIUM+D PO) Take  by mouth.  atenolol (TENORMIN) 25 mg tablet Take 25 mg by mouth two (2) times a day. Social History     Social History    Marital status: UNKNOWN     Spouse name: N/A    Number of children: N/A    Years of education: N/A     Social History Main Topics    Smoking status: Former Smoker    Smokeless tobacco: Never Used    Alcohol use 0.6 oz/week     1 Glasses of wine per week    Drug use: No    Sexual activity: Not Asked     Other Topics Concern    None     Social History Narrative     History reviewed. No pertinent family history. Review of Systems  Constitutional:  negative for fevers, chills, anorexia and weight loss  Eyes:    negative for visual disturbance and irritation  ENT:    negative for tinnitus,sore throat,nasal congestion,ear pains. hoarseness  Respiratory:     negative for cough, hemoptysis, dyspnea,wheezing  CV:    negative for chest pain, palpitations, lower extremity edema  GI:    negative for nausea, vomiting, diarrhea, abdominal pain,melena  Endo:               negative for polyuria,polydipsia,polyphagia,heat intolerance  Genitourinary : negative for frequency, dysuria and hematuria  Integumentary: negative for rash and pruritus  Hematologic:   negative for easy bruising and gum/nose bleeding  Musculoskel:  negative for myalgias, arthralgias, back pain, , joint pain  Neurological:   negative for headaches, dizziness, vertigo, memory problems and gait   Behavl/Psych:  negative for feelings of anxiety, depression, mood changes  ROS otherwise negative      Objective:  Visit Vitals    /84 (BP 1 Location: Left arm, BP Patient Position: Sitting)    Pulse 76    Temp 98.9 °F (37.2 °C) (Oral)    Resp 16    Ht 5' 3\" (1.6 m)    Wt 125 lb 6.4 oz (56.9 kg)    SpO2 99%    BMI 22.21 kg/m2     Physical Exam:   General appearance - alert, well appearing, and in no distress  Mental status - alert, oriented to person, place, and time  EYE-TATYANA, EOMI, fundi normal, corneas normal, no foreign bodies  ENT-ENT exam normal, no neck nodes or sinus tenderness  Nose - normal and patent, no erythema, discharge or polyps  Mouth - mucous membranes moist, pharynx normal without lesions  Neck - supple, no significant adenopathy   Chest - clear to auscultation, no wheezes, rales or rhonchi, symmetric air entry   Heart - normal rate, regular rhythm, normal S1, S2, no murmurs, rubs, clicks or gallops   Abdomen - soft, nontender, nondistended, no masses or organomegaly  Lymph- no adenopathy palpable  Ext-peripheral pulses normal, no pedal edema, no clubbing or cyanosis  Skin-skin tear right forearm, hematoma with laceration scalp with 2 staples in place, small skin tear left anterior shin  Neuro -alert, oriented, normal speech, left patellar reflex is diminished compared to the right, strength 4/5 left lower extremity proximally, 4+ out of 5 distally, straight leg raising test is negative      Assessment/Plan:  Diagnoses and all orders for this visit:    1. Laceration of scalp, subsequent encounter    2. Sciatica of left side    3. Skin tear of right elbow without complication, subsequent encounter    4. Skin tear of left lower leg without complication, subsequent encounter    Other orders  -     predniSONE (STERAPRED DS) 10 mg dose pack; Take 1 Tab by mouth See Admin Instructions. See administration instruction per 10mg dose pack          ICD-10-CM ICD-9-CM    1. Laceration of scalp, subsequent encounter S01. 01XD V58.89      873.0    2. Sciatica of left side M54.32 724.3    3. Skin tear of right elbow without complication, subsequent encounter S51.011D V58.89      881.01    4. Skin tear of left lower leg without complication, subsequent encounter S81.812D V58.89      891.0      Plan:    Laceration of scalp. Follow-up to remove sutures and evaluate for wound healing in 1 week. Patient has weakness involving her left lower extremity which was most likely the cause of her fall. She will be placed empirically on a Sterapred Dosepak. She is able to ambulate with a cane at this time. Because of her previous fall she is warned to be careful with ambulation to avoid another fall. On follow-up if she is not significantly improved with a steroid taper will consider further imaging studies and physical therapy for same. Follow-up Disposition:  Return in about 1 week (around 8/1/2018) for follow up. I have reviewed with the patient details of the assessment and plan and all questions were answered. Relevent patient education was performed. Verbal and/or written instructions (see AVS) provided. The most recent lab findings were reviewed with the patient. Plan was discussed with patient who verbally expressed understanding. An After Visit Summary was printed and given to the patient.     Reno Valdovinos MD

## 2018-07-25 NOTE — MR AVS SNAPSHOT
[facility] 
 
 
 [address] [address] [phone] Patient: Miladis Martins MRN: [id number] MERARY:[birth year] Visit Information Date & Time Provider Department Dept. Phone Encounter #  
 7/25/2018  3:00 PM MD Cesia Torres [phone] [id number] Follow-up Instructions Return in about 1 week (around 8/1/2018) for follow up. Your Appointments 8/1/2018 10:40 AM  
ACUTE CARE with MD Cesia Torres ([facility]) Appt Note: Remove sutures in head [address] [phone] [address] [address]  
  
    
 12/19/2018 10:10 AM  
Any with MD Cesia Torres ([facility]) Appt Note: 6 mo follow up [address] [address] [address] [address]  
  
    
 1/9/2019  9:00 AM  
DEXA with DEXA SCAN  
[facility] ([facility]) Appt Note: DEXA  appt with Dr Kirti Ivory @9:30  
 [address] [phone] [address] [facility] [address]  
  
    
 1/9/2019  9:30 AM  
Follow Up with MD MICKI Torres [facility] ([facility]) Appt Note: 6 mo follow up [address] [phone] [address] [address] Upcoming Health Maintenance Date Due FOBT Q 1 YEAR AGE 50-75 9/6/1993 ZOSTER VACCINE AGE 60> 7/6/2003 BREAST CANCER SCRN MAMMOGRAM 8/24/2011 Influenza Age 5 to Adult 8/1/2018 MEDICARE YEARLY EXAM 12/14/2018 GLAUCOMA SCREENING Q2Y 4/11/2020 DTaP/Tdap/Td series (2 - Td) 7/23/2028 Allergies as of 7/25/2018  Review Complete On: 7/25/2018 By: Carmella Juarez LPN Severity Noted Reaction Type Reactions Diclofenac  12/11/2017    Unknown (comments) Patient reports she is not allergic Current Immunizations  Reviewed on 12/13/2017 Name Date Influenza High Dose Vaccine PF 10/8/2017 Influenza Vaccine 11/28/2016, 10/8/2015, 10/16/2013 Pneumococcal Conjugate (PCV-13) 11/12/2015 Pneumococcal Polysaccharide (PPSV-23) 12/13/2017 Pneumococcal Vaccine (Unspecified Type) 9/27/2012 Tdap 7/23/2018  1:44 AM  
  
 Not reviewed this visit You Were Diagnosed With   
  
 Codes Comments Laceration of scalp, subsequent encounter    -  Primary ICD-10-CM: S01. 01XD ICD-9-CM: V58.89, 873.0 Sciatica of left side     ICD-10-CM: M54.32 
ICD-9-CM: 724.3 Vitals BP Pulse Temp Resp Height(growth percentile) Weight(growth percentile) 126/84 (BP 1 Location: Left arm, BP Patient Position: Sitting) 76 98.9 °F (37.2 °C) (Oral) 16 5' 3\" (1.6 m) 125 lb 6.4 oz (56.9 kg) SpO2 BMI OB Status Smoking Status 99% 22.21 kg/m2 Postmenopausal Former Smoker Vitals History BMI and BSA Data Body Mass Index Body Surface Area  
 22.21 kg/m 2 1.59 m 2 Preferred Pharmacy Pharmacy Name Phone [id number] [facility] [phone] Your Updated Medication List  
  
   
This list is accurate as of 7/25/18  3:43 PM.  Always use your most recent med list.  
  
  
  
  
 aspirin delayed-release 81 mg tablet Take  by mouth daily. atenolol 25 mg tablet Commonly known as:  TENORMIN Take 25 mg by mouth two (2) times a day. CALCIUM+D PO Take  by mouth.  
  
 predniSONE 10 mg dose pack Commonly known as:  STERAPRED DS Take 1 Tab by mouth See Admin Instructions. See administration instruction per 10mg dose pack VITAMIN C 250 mg tablet Generic drug:  ascorbic acid (vitamin C) Take  by mouth. Prescriptions Printed Refills  
 predniSONE (STERAPRED DS) 10 mg dose pack 0 Sig: Take 1 Tab by mouth See Admin Instructions. See administration instruction per 10mg dose pack Class: Print Route: Oral  
  
Follow-up Instructions Return in about 1 week (around 8/1/2018) for follow up. Introducing [facility] & [facility] SERVICES! Garrett Darling introduces [employer] patient portal. Now you can access parts of your medical record, email your doctor's office, and request medication refills online. 1. In your internet browser, go to [email or web address] 2. Click on the First Time User? Click Here link in the Sign In box. You will see the New Member Sign Up page. 3. Enter your AudioPixels Access Code exactly as it appears below. You will not need to use this code after youve completed the sign-up process. If you do not sign up before the expiration date, you must request a new code. · AudioPixels Access Code: [email or web address] Expires: 9/13/2018  1:44 PM 
 
4. Enter the last four digits of your Social Security Number (xxxx) and Date of Birth (mm/dd/yyyy) as indicated and click Submit. You will be taken to the next sign-up page. 5. Create a AudioPixels ID. This will be your AudioPixels login ID and cannot be changed, so think of one that is secure and easy to remember. 6. Create a AudioPixels password. You can change your password at any time. 7. Enter your Password Reset Question and Answer. This can be used at a later time if you forget your password. 8. Enter your e-mail address. You will receive e-mail notification when new information is available in [address] 9. Click Sign Up. You can now view and download portions of your medical record. 10. Click the Download Summary menu link to download a portable copy of your medical information.  
 
If you have questions, please visit the Frequently Asked Questions section of the [email or web address]. Remember, GLOGhart is NOT to be used for urgent needs. For medical emergencies, dial 911. Now available from your iPhone and Android! Please provide this summary of care documentation to your next provider. Your primary care clinician is listed as Rose Martino. If you have any questions after today's visit, please call [phone].

## 2018-08-01 ENCOUNTER — OFFICE VISIT (OUTPATIENT)
Dept: INTERNAL MEDICINE CLINIC | Age: 73
End: 2018-08-01

## 2018-08-01 VITALS
WEIGHT: 122.6 LBS | DIASTOLIC BLOOD PRESSURE: 70 MMHG | RESPIRATION RATE: 19 BRPM | SYSTOLIC BLOOD PRESSURE: 132 MMHG | OXYGEN SATURATION: 99 % | TEMPERATURE: 98 F | BODY MASS INDEX: 21.72 KG/M2 | HEART RATE: 72 BPM | HEIGHT: 63 IN

## 2018-08-01 DIAGNOSIS — S01.01XD LACERATION OF SCALP, SUBSEQUENT ENCOUNTER: ICD-10-CM

## 2018-08-01 DIAGNOSIS — Z48.02 VISIT FOR SUTURE REMOVAL: Primary | ICD-10-CM

## 2018-08-01 NOTE — MR AVS SNAPSHOT
[address] [facility] 
 
 
 [address] [address] [phone] Patient: Ricardo Manzo MRN: [id number] QMM:[birth year] Visit Information Date & Time Provider Department Dept. Phone Encounter #  
 8/1/2018 10:40 AM MD Cesia Burdick [phone] [id number] Your Appointments 12/19/2018 10:10 AM  
Any with MD Cesia Burdick  ([facility]) Appt Note: 6 mo follow up [address] [address] [address] [facility] [address] [facility] [address]  
  
    
 1/9/2019  9:00 AM  
DEXA with DEXA SCAN  
[facility] ([facility]) Appt Note: DEXA  appt with Dr Silvia Butcher @9:30  
 [address] [address] [address] [facility] [address]  
  
    
 1/9/2019  9:30 AM  
Follow Up with MD MICKI Burdick [facility] ([facility]) Appt Note: 6 mo follow up [address] [address] [address] [facility] [address] Upcoming Health Maintenance Date Due FOBT Q 1 YEAR AGE 50-75 9/6/1993 ZOSTER VACCINE AGE 60> 7/6/2003 BREAST CANCER SCRN MAMMOGRAM 8/24/2011 Influenza Age 5 to Adult 8/1/2018 MEDICARE YEARLY EXAM 12/14/2018 GLAUCOMA SCREENING Q2Y 4/11/2020 DTaP/Tdap/Td series (2 - Td) 7/23/2028 Allergies as of 8/1/2018  Review Complete On: 8/1/2018 By: Beth Dillard LPN Severity Noted Reaction Type Reactions Diclofenac  12/11/2017    Unknown (comments) Patient reports she is not allergic Current Immunizations  Reviewed on 8/1/2018 Name Date Influenza High Dose Vaccine PF 10/8/2017 Influenza Vaccine 11/28/2016, 10/8/2015, 10/16/2013 Pneumococcal Conjugate (PCV-13) 11/12/2015 Pneumococcal Polysaccharide (PPSV-23) 12/13/2017 Pneumococcal Vaccine (Unspecified Type) 9/27/2012 Tdap 7/23/2018  1:44 AM  
  
 Reviewed by Nicci Ge MD on 8/1/2018 at 12:10 PM  
Vitals BP Pulse Temp Resp Height(growth percentile) Weight(growth percentile) 132/70 (BP 1 Location: Right arm, BP Patient Position: Sitting) 72 98 °F (36.7 °C) (Oral) 19 5' 3\" (1.6 m) 122 lb 9.6 oz (55.6 kg) SpO2 BMI OB Status Smoking Status 99% 21.72 kg/m2 Postmenopausal Former Smoker Vitals History BMI and BSA Data Body Mass Index Body Surface Area 21.72 kg/m 2 1.57 m 2 Preferred Pharmacy Pharmacy Name Phone [phone] [facility] [phone] Your Updated Medication List  
  
   
This list is accurate as of 8/1/18 12:10 PM.  Always use your most recent med list.  
  
  
  
  
 aspirin delayed-release 81 mg tablet Take  by mouth daily. atenolol 25 mg tablet Commonly known as:  TENORMIN Take 25 mg by mouth two (2) times a day. CALCIUM+D PO Take  by mouth.  
  
 predniSONE 10 mg dose pack Commonly known as:  STERAPRED DS Take 1 Tab by mouth See Admin Instructions. See administration instruction per 10mg dose pack VITAMIN C 250 mg tablet Generic drug:  ascorbic acid (vitamin C) Take  by mouth. Introducing [facility] & HEALTH SERVICES! [facility] introduces [employer] patient portal. Now you can access parts of your medical record, email your doctor's office, and request medication refills online. 1. In your internet browser, go to [email or web address] 2. Click on the First Time User? Click Here link in the Sign In box. You will see the New Member Sign Up page. 3. Enter your Posiba Access Code exactly as it appears below.  You will not need to use this code after youve completed the sign-up process. If you do not sign up before the expiration date, you must request a new code. · HZO Access Code: [email or web address] Expires: 9/13/2018  1:44 PM 
 
4. Enter the last four digits of your Social Security Number (xxxx) and Date of Birth (mm/dd/yyyy) as indicated and click Submit. You will be taken to the next sign-up page. 5. Create a HZO ID. This will be your HZO login ID and cannot be changed, so think of one that is secure and easy to remember. 6. Create a HZO password. You can change your password at any time. 7. Enter your Password Reset Question and Answer. This can be used at a later time if you forget your password. 8. Enter your e-mail address. You will receive e-mail notification when new information is available in [address] 9. Click Sign Up. You can now view and download portions of your medical record. 10. Click the Download Summary menu link to download a portable copy of your medical information. If you have questions, please visit the Frequently Asked Questions section of the HZO website. Remember, HZO is NOT to be used for urgent needs. For medical emergencies, dial 911. Now available from your iPhone and Android! Please provide this summary of care documentation to your next provider. Your primary care clinician is listed as Ted Dozier. If you have any questions after today's visit, please call [phone].

## 2018-08-01 NOTE — PROGRESS NOTES
Chief Complaint   Patient presents with    Suture Removal     patient to have sutures removed     1. Have you been to the ER, urgent care clinic since your last visit? [facility]  Hospitalized since your last visit? No      2. Have you seen or consulted any other health care providers outside of the [address] [place] [address] since your last visit? No      Patient is having some pain with numbness down the left leg.

## 2018-08-06 NOTE — PROGRESS NOTES
This note will not be viewable in [address] Lilliam Nolasco is a 74 y.o. female and presents with Suture Removal (patient to have sutures removed)  . Subjective:  No reported problems. Head feels fine. No headache, soreness, bleeding, dizziness, tinnitis, or blurred vision. Past Medical History:   Diagnosis Date    Cataract 12/11/2017    Chronic kidney insufficiency 12/11/2017    Depression 12/11/2017    Dyslipidemia 12/11/2017    Fatigue 12/11/2017    GERD (gastroesophageal reflux disease)     Glaucoma 12/11/2017    Gout 12/11/2017    Hypertension     Hypertension, benign 12/11/2017    On statin therapy 12/11/2017    Pre-ulcerative corn or callous 12/11/2017    Right foot injury 12/11/2017     Past Surgical History:   Procedure Laterality Date    COLONOSCOPY N/A 1/6/2017    COLONOSCOPY performed by Ziggy Betancourt MD at [facility] ENDOSCOPY    HX HEENT      eye surgery    [place] COLON CA SCRN NOT HI RSK IND  1/6/2017          Allergies   Allergen Reactions    Diclofenac Unknown (comments)     Patient reports she is not allergic     Current Outpatient Prescriptions   Medication Sig Dispense Refill    aspirin delayed-release 81 mg tablet Take  by mouth daily.  ascorbic acid, vitamin C, (VITAMIN C) 250 mg tablet Take  by mouth.  CALCIUM CARBONATE/VITAMIN D3 (CALCIUM+D PO) Take  by mouth.  atenolol (TENORMIN) 25 mg tablet Take 25 mg by mouth two (2) times a day.  predniSONE (STERAPRED DS) 10 mg dose pack Take 1 Tab by mouth See Admin Instructions.  See administration instruction per 10mg dose pack 21 Tab 0     Social History     Social History    Marital status: UNKNOWN     Spouse name: N/A    Number of children: N/A    Years of education: N/A     Social History Main Topics    Smoking status: Former Smoker    Smokeless tobacco: Never Used    Alcohol use 0.6 oz/week     1 Glasses of wine per week    Drug use: No    Sexual activity: Not Asked     Other Topics Concern    None     Social History Narrative     History reviewed. No pertinent family history. Review of Systems  Constitutional:  negative for fevers, chills, anorexia and weight loss  Eyes:    negative for visual disturbance and irritation  ENT:    negative for tinnitus,sore throat,nasal congestion,ear pains. hoarseness  Respiratory:     negative for cough, hemoptysis, dyspnea,wheezing  CV:    negative for chest pain, palpitations, lower extremity edema  GI:    negative for nausea, vomiting, diarrhea, abdominal pain,melena  Endo:               negative for polyuria,polydipsia,polyphagia,heat intolerance  Genitourinary : negative for frequency, dysuria and hematuria  Integumentary: negative for rash and pruritus  Hematologic:   negative for easy bruising and gum/nose bleeding  Musculoskel:  negative for myalgias, arthralgias, back pain, muscle weakness, joint pain  Neurological:   negative for headaches, dizziness, vertigo, memory problems and gait   Behavl/Psych:  negative for feelings of anxiety, depression, mood changes  ROS otherwise negative      Objective:  Visit Vitals    /70 (BP 1 Location: Right arm, BP Patient Position: Sitting)    Pulse 72    Temp 98 °F (36.7 °C) (Oral)    Resp 19    Ht 5' 3\" (1.6 m)    Wt 122 lb 9.6 oz (55.6 kg)    SpO2 99%    BMI 21.72 kg/m2     Physical Exam:   General appearance - alert, well appearing, and in no distress  Mental status - alert, oriented to person, place, and time  EYE-TATYANA, EOMI, fundi normal, corneas normal, no foreign bodies  ENT-ENT exam normal, no neck nodes or sinus tenderness  Nose - normal and patent, no erythema, discharge or polyps  Mouth - mucous membranes moist, pharynx normal without lesions  Neck - supple, no significant adenopathy   Chest - clear to auscultation, no wheezes, rales or rhonchi, symmetric air entry   Heart - normal rate, regular rhythm, normal S1, S2, no murmurs, rubs, clicks or gallops   Lymph- no adenopathy palpable  Ext-peripheral pulses normal, no pedal edema, no clubbing or cyanosis  Skin-two staples removed from lac on posterior scalp without difficulty, wound clean dry intact  Neuro -alert, oriented, normal speech, no focal findings or movement disorder noted      Assessment/Plan:  Diagnoses and all orders for this visit:    1. Visit for suture removal    2. Laceration of scalp, subsequent encounter          ICD-10-CM ICD-9-CM    1. Visit for suture removal Z48.02 V58.32    2. Laceration of scalp, subsequent encounter S01. 01XD V58.89      873.0          Follow-up Disposition:  Return for as scheduled. I have reviewed with the patient details of the assessment and plan and all questions were answered. Relevent patient education was performed. Verbal and/or written instructions (see AVS) provided. The most recent lab findings were reviewed with the patient. Plan was discussed with patient who verbally expressed understanding. An After Visit Summary was printed and given to the patient.     Oli Catalan MD

## 2018-08-20 RX ORDER — ATENOLOL 25 MG/1
25 TABLET ORAL 2 TIMES DAILY
Qty: 180 TAB | Refills: 3 | Status: SHIPPED | OUTPATIENT
Start: 2018-08-20 | End: 2018-08-23

## 2018-08-23 RX ORDER — ATENOLOL 50 MG/1
50 TABLET ORAL 2 TIMES DAILY
Qty: 180 TAB | Refills: 3 | Status: SHIPPED | OUTPATIENT
Start: 2018-08-23 | End: 2019-10-11 | Stop reason: SDUPTHER

## 2019-01-09 ENCOUNTER — OFFICE VISIT (OUTPATIENT)
Dept: INTERNAL MEDICINE CLINIC | Age: 74
End: 2019-01-09

## 2019-01-09 VITALS
RESPIRATION RATE: 16 BRPM | WEIGHT: 124.4 LBS | TEMPERATURE: 97.5 F | HEIGHT: 63 IN | BODY MASS INDEX: 22.04 KG/M2 | SYSTOLIC BLOOD PRESSURE: 128 MMHG | HEART RATE: 69 BPM | OXYGEN SATURATION: 99 % | DIASTOLIC BLOOD PRESSURE: 78 MMHG

## 2019-01-09 DIAGNOSIS — E78.5 DYSLIPIDEMIA: ICD-10-CM

## 2019-01-09 DIAGNOSIS — R53.83 FATIGUE, UNSPECIFIED TYPE: ICD-10-CM

## 2019-01-09 DIAGNOSIS — Z13.31 SCREENING FOR DEPRESSION: ICD-10-CM

## 2019-01-09 DIAGNOSIS — R73.02 IGT (IMPAIRED GLUCOSE TOLERANCE): ICD-10-CM

## 2019-01-09 DIAGNOSIS — Z00.00 MEDICARE ANNUAL WELLNESS VISIT, SUBSEQUENT: Primary | ICD-10-CM

## 2019-01-09 DIAGNOSIS — Z13.39 SCREENING FOR ALCOHOLISM: ICD-10-CM

## 2019-01-09 DIAGNOSIS — I10 HYPERTENSION, BENIGN: ICD-10-CM

## 2019-01-09 LAB
BACTERIA UA POCT, BACTPOCT: NORMAL
BILIRUB UR QL STRIP: NEGATIVE
CASTS UA POCT: NORMAL
CLUE CELLS, CLUEPOCT: NEGATIVE
CRYSTALS UA POCT, CRYSPOCT: NEGATIVE
EPITHELIAL CELLS POCT: NEGATIVE
GLUCOSE UR-MCNC: NEGATIVE MG/DL
GRAN# POC: 5.5 K/UL (ref 2–7.8)
GRAN% POC: 63.5 % (ref 37–92)
HCT VFR BLD CALC: 50.2 % (ref 37–51)
HGB BLD-MCNC: 16.3 G/DL (ref 12–18)
KETONES P FAST UR STRIP-MCNC: NEGATIVE MG/DL
LY# POC: 2.9 K/UL (ref 0.6–4.1)
LY% POC: 33.9 % (ref 10–58.5)
MCH RBC QN: 26.7 PG (ref 26–32)
MCHC RBC-ENTMCNC: 32.5 G/DL (ref 30–36)
MCV RBC: 82 FL (ref 80–97)
MID #, POC: 0.2 K/UL (ref 0–1.8)
MID% POC: 2.6 % (ref 0.1–24)
MUCUS UA POCT, MUCPOCT: NORMAL
PH UR STRIP: 7 [PH] (ref 5–7)
PLATELET # BLD: 201 K/UL (ref 140–440)
PROT UR QL STRIP: NEGATIVE
RBC # BLD: 6.1 M/UL (ref 4.2–6.3)
RBC UA POCT, RBCPOCT: 0
SP GR UR STRIP: 1.01 (ref 1.01–1.02)
TRICH UA POCT, TRICHPOC: NEGATIVE
UA UROBILINOGEN AMB POC: NORMAL (ref 0.2–1)
URINALYSIS CLARITY POC: CLEAR
URINALYSIS COLOR POC: YELLOW
URINE BLOOD POC: NEGATIVE
URINE CULT COMMENT, POCT: NORMAL
URINE LEUKOCYTES POC: NEGATIVE
URINE NITRITES POC: NEGATIVE
WBC # BLD: 8.6 K/UL (ref 4.1–10.9)
WBC UA POCT, WBCPOCT: 0
YEAST UA POCT, YEASTPOC: NEGATIVE

## 2019-01-09 NOTE — PROGRESS NOTES
Chief Complaint Patient presents with  Hypertension 6m f/u 1. Have you been to the ER, urgent care clinic since your last visit? Hospitalized since your last visit? No 
 
2. Have you seen or consulted any other health care providers outside of the [address] [place] [address] since your last visit? Include any pap smears or colon screening. No 
 
Pt got both doses of Shingrix at [facility] B/p elevated doctor notified.

## 2019-01-09 NOTE — PATIENT INSTRUCTIONS
Medicare Wellness Visit, Female The best way to live healthy is to have a lifestyle where you eat a well-balanced diet, exercise regularly, limit alcohol use, and quit all forms of tobacco/nicotine, if applicable. Regular preventive services are another way to keep healthy. Preventive services (vaccines, screening tests, monitoring & exams) can help personalize your care plan, which helps you manage your own care. Screening tests can find health problems at the earliest stages, when they are easiest to treat. [facility] follows the current, evidence-based guidelines published by the [facility] ([facility]) when recommending preventive services for our patients. Because we follow these guidelines, sometimes recommendations change over time as research supports it. (For example, mammograms used to be recommended annually. Even though Medicare will still pay for an annual mammogram, the newer guidelines recommend a mammogram every two years for women of average risk.) Of course, you and your doctor may decide to screen more often for some diseases, based on your risk and your health status. Preventive services for you include: - Medicare offers their members a free annual wellness visit, which is time for you and your primary care provider to discuss and plan for your preventive service needs. Take advantage of this benefit every year! 
-All adults over the age of 72 should receive the recommended pneumonia vaccines. Current USPSTF guidelines recommend a series of two vaccines for the best pneumonia protection.  
-All adults should have a flu vaccine yearly and a tetanus vaccine every 10 years. All adults age 61 and older should receive a shingles vaccine once in their lifetime.   
-A bone mass density test is recommended when a woman turns 65 to screen for osteoporosis. This test is only recommended one time, as a screening. Some providers will use this same test as a disease monitoring tool if you already have osteoporosis. -All adults age 38-68 who are overweight should have a diabetes screening test once every three years.  
-Other screening tests and preventive services for persons with diabetes include: an eye exam to screen for diabetic retinopathy, a kidney function test, a foot exam, and stricter control over your cholesterol.  
-Cardiovascular screening for adults with routine risk involves an electrocardiogram (ECG) at intervals determined by your doctor.  
-Colorectal cancer screenings should be done for adults age 54-65 with no increased risk factors for colorectal cancer. There are a number of acceptable methods of screening for this type of cancer. Each test has its own benefits and drawbacks. Discuss with your doctor what is most appropriate for you during your annual wellness visit. The different tests include: colonoscopy (considered the best screening method), a fecal occult blood test, a fecal DNA test, and sigmoidoscopy. -Breast cancer screenings are recommended every other year for women of normal risk, age 54-69. 
-Cervical cancer screenings for women over age 72 are only recommended with certain risk factors.  
-All adults born between [facility] should be screened once for Hepatitis C. Here is a list of your current Health Maintenance items (your personalized list of preventive services) with a due date: 
Health Maintenance Due Topic Date Due  Shingles Vaccine (1 of 2) 09/06/1993 [facility] Annual Well Visit  12/14/2018 All Medicare beneficiaries aged 48 and older are covered; however, when a beneficiary is at high risk, there is no minimum age required to receive a screening colonoscopy or a barium enema rendered as an alternative to a screening colonoscopy. The following are the coverage criteria for each colorectal cancer screening test/procedure. Screening FOBT Medicare provides coverage of a screening FOBT annually (i.e., at least 11 months have passed following the month in which the last covered screening FOBT was performed) for beneficiaries aged 48 and older. This screening requires a written order from the beneficiarys attending physician. NOTE: Medicare will only provide coverage for one FOBT per year: either HCPCS code  or CPT code 54055, but not both. Screening Colonoscopy For Beneficiaries at [facility] Colorectal Cancer Medicare provides coverage of a screening colonoscopy (HCPCS code ) once every 2 years for beneficiaries at high risk for developing colorectal cancer (i.e., at least 23 months have passed following the month in which the last covered screening colonoscopy [HCPCS code ] was performed). For Beneficiaries Not at [facility] Medicare provides coverage of a screening colonoscopy (HCPCS code ) for beneficiaries who do not meet the criteria for being at high risk for developing colorectal cancer once every 10 years (i.e., at least 119 months have passed following the month in which the last covered screening colonoscopy [HCPCS code ] was performed). If the beneficiary otherwise qualifies to have a covered screening colonoscopy (HCPCS code ) based on the above but has had a covered screening flexible sigmoidoscopy (HCPCS code ), then Medicare may cover a screening colonoscopy (HCPCS code ) only after at least 47 months have passed following the month in which the last covered screening flexible sigmoidoscopy (HCPCS code ) was performed.

## 2019-01-09 NOTE — PROGRESS NOTES
This note will not be viewable in [address] Nico Huber is a 74 y.o. female and presents with Hypertension (6m f/u) Shadi Skelton Subjective: 
 
Mrs. Latia Mix presents today for follow-up of hypertension. She has a DEXA scan scheduled for this morning as well. She has been doing exceptionally well on her current medical regimen. Unfortunately she had a tooth filled yesterday which fell out and she has to return to the dentist today to have this fixed. She denies any shortness of breath, chest pain, palpitations, PND, orthopnea, or pedal edema. She has an excellent lifestyle and remains very active and has an excellent diet. Review of Systems Constitutional:  
Eyes:   negative for visual disturbance and irritation ENT:   negative for tinnitus,sore throat,nasal congestion,ear pains. hoarseness Respiratory:  negative for cough, hemoptysis, dyspnea,wheezing CV:   negative for chest pain, palpitations, lower extremity edema GI:   negative for nausea, vomiting, diarrhea, abdominal pain,melena Endo:               negative for polyuria,polydipsia,polyphagia,heat intolerance Genitourinary: negative for frequency, dysuria and hematuria Integumentary: negative for rash and pruritus Hematologic:  negative for easy bruising and gum/nose bleeding Musculoskel: negative for myalgias, arthralgias, back pain, muscle weakness, joint pain Neurological:  negative for headaches, dizziness, vertigo, memory problems and gait Behavl/Psych: negative for feelings of anxiety, depression, mood changes Past Medical History:  
Diagnosis Date  Cataract 12/11/2017  Chronic kidney insufficiency 12/11/2017  Depression 12/11/2017  Dyslipidemia 12/11/2017  Fatigue 12/11/2017  GERD (gastroesophageal reflux disease)  Glaucoma 12/11/2017  Gout 12/11/2017  Hypertension  Hypertension, benign 12/11/2017  
 IGT (impaired glucose tolerance) 1/9/2019  On statin therapy 12/11/2017  Pre-ulcerative corn or callous 12/11/2017  Right foot injury 12/11/2017 Past Surgical History:  
Procedure Laterality Date  COLONOSCOPY N/A 1/6/2017 COLONOSCOPY performed by Yoon Srinivasan MD at [facility] ENDOSCOPY  
 HX HEENT    
 eye surgery  [place] COLON CA SCRN NOT HI RSK IND  1/6/2017 Social History Socioeconomic History  Marital status:  Spouse name: Not on file  Number of children: Not on file  Years of education: Not on file  Highest education level: Not on file Tobacco Use  Smoking status: Former Smoker  Smokeless tobacco: Never Used Substance and Sexual Activity  Alcohol use: Yes Alcohol/week: 0.6 oz Types: 1 Glasses of wine per week  Drug use: No  
 
History reviewed. No pertinent family history. Current Outpatient Medications Medication Sig Dispense Refill  atenolol (TENORMIN) 50 mg tablet Take 1 Tab by mouth two (2) times a day. 180 Tab 3  
 aspirin delayed-release 81 mg tablet Take  by mouth daily.  ascorbic acid, vitamin C, (VITAMIN C) 250 mg tablet Take 250 mg by mouth daily.  CALCIUM CARBONATE/VITAMIN D3 (CALCIUM+D PO) Take 1 Tab by mouth two (2) times a day. Allergies Allergen Reactions  Diclofenac Unknown (comments) Patient reports she is not allergic Objective: 
Visit Vitals /78 (BP 1 Location: Left arm, BP Patient Position: Sitting) Pulse 69 Temp 97.5 °F (36.4 °C) (Oral) Resp 16 Ht 5' 3\" (1.6 m) Wt 124 lb 6.4 oz (56.4 kg) SpO2 99% BMI 22.04 kg/m² Physical Exam:  
General appearance - alert, well appearing, and in no distress Mental status - alert, oriented to person, place, and time EYE-TATYANA, EOMI, fundi normal, corneas normal, no foreign bodies ENT-ENT exam normal, no neck nodes or sinus tenderness Nose - normal and patent, no erythema, discharge or polyps Mouth - mucous membranes moist, pharynx normal without lesions Neck - supple, no significant adenopathy Chest - clear to auscultation, no wheezes, rales or rhonchi, symmetric air entry Heart - normal rate, regular rhythm, normal S1, S2, no murmurs, rubs, clicks or gallops Abdomen - soft, nontender, nondistended, no masses or organomegaly Lymph- no adenopathy palpable Ext-peripheral pulses normal, no pedal edema, no clubbing or cyanosis Skin-Warm and dry. no hyperpigmentation, vitiligo, or suspicious lesions Neuro -alert, oriented, normal speech, no focal findings or movement disorder noted Musculoskeletal- FROM, no bony abnormalities, no point tenderness No results found for this visit on 01/09/19. All results for lab orders may not have been returned by the time this encountered was closed. Assessment/Plan: ICD-10-CM ICD-9-CM 1. Hypertension, benign I10 401.1 AMB POC URINALYSIS DIP STICK AUTO W/ MICRO   
   METABOLIC PANEL, COMPREHENSIVE 2. Dyslipidemia E78.5 272.4 LIPID PANEL  
3. IGT (impaired glucose tolerance) R73.02 790.22 HEMOGLOBIN A1C WITH EAG  
4. Fatigue, unspecified type R53.83 780.79 AMB POC COMPLETE CBC,AUTOMATED ENTER Orders Placed This Encounter  METABOLIC PANEL, COMPREHENSIVE  LIPID PANEL  
 HEMOGLOBIN A1C WITH EAG  
 AMB POC COMPLETE CBC,AUTOMATED ENTER  AMB POC URINALYSIS DIP STICK AUTO W/ MICRO Plan: 
 
Continue current medical regimen as outlined above. Follow-up labs as ordered. Return to clinic in 6 months unless otherwise indicated. Follow-up Disposition: Not on File I have reviewed with the patient details of the assessment and plan and all questions were answered. Relevent patient education was performed. Verbal and/or written instructions (see AVS) provided. The most recent lab findings were reviewed with the patient. Plan was discussed with patient who verbal expressed understanding. An After Visit Summary was printed and given to the patient.  
 
 
Kobe Rangel MD 
 
 This is the Subsequent Medicare Annual Wellness Exam, performed 12 months or more after the Initial AWV or the last Subsequent AWV I have reviewed the patient's medical history in detail and updated the computerized patient record. History Past Medical History:  
Diagnosis Date  Cataract 12/11/2017  Chronic kidney insufficiency 12/11/2017  Depression 12/11/2017  Dyslipidemia 12/11/2017  Fatigue 12/11/2017  GERD (gastroesophageal reflux disease)  Glaucoma 12/11/2017  Gout 12/11/2017  Hypertension  Hypertension, benign 12/11/2017  
 IGT (impaired glucose tolerance) 1/9/2019  On statin therapy 12/11/2017  Pre-ulcerative corn or callous 12/11/2017  Right foot injury 12/11/2017 Past Surgical History:  
Procedure Laterality Date  COLONOSCOPY N/A 1/6/2017 COLONOSCOPY performed by Yoon Srinivasan MD at [facility] ENDOSCOPY  
 HX HEENT    
 eye surgery  [place] COLON CA SCRN NOT HI RSK IND  1/6/2017 Current Outpatient Medications Medication Sig Dispense Refill  atenolol (TENORMIN) 50 mg tablet Take 1 Tab by mouth two (2) times a day. 180 Tab 3  
 aspirin delayed-release 81 mg tablet Take  by mouth daily.  ascorbic acid, vitamin C, (VITAMIN C) 250 mg tablet Take 250 mg by mouth daily.  CALCIUM CARBONATE/VITAMIN D3 (CALCIUM+D PO) Take 1 Tab by mouth two (2) times a day. Allergies Allergen Reactions  Diclofenac Unknown (comments) Patient reports she is not allergic History reviewed. No pertinent family history. Social History Tobacco Use  Smoking status: Former Smoker  Smokeless tobacco: Never Used Substance Use Topics  Alcohol use: Yes Alcohol/week: 0.6 oz Types: 1 Glasses of wine per week Patient Active Problem List  
Diagnosis Code  Glaucoma H40.9  Dyslipidemia E78.5  Hypertension, benign I10  
 Cataract H26.9  Right foot injury E83.422X  IGT (impaired glucose tolerance) R73.02 Depression Risk Factor Screening: PHQ over the last two weeks 12/13/2017 Little interest or pleasure in doing things Not at all Feeling down, depressed, irritable, or hopeless Not at all Total Score PHQ 2 0 Alcohol Risk Factor Screening: You do not drink alcohol or very rarely. Functional Ability and Level of Safety:  
Hearing Loss Hearing is good. Activities of Daily Living The home contains: no safety equipment. Patient does total self care Fall Risk Fall Risk Assessment, last 12 mths 7/25/2018 Able to walk? Yes Fall in past 12 months? Yes Fall with injury? Yes  
Number of falls in past 12 months 1 Fall Risk Score 2 Abuse Screen Patient is not abused Cognitive Screening Evaluation of Cognitive Function: 
Has your family/caregiver stated any concerns about your memory: no 
Normal 
 
Patient Care Team  
Patient Care Team: 
Lukas Stoll MD as PCP - General (Dermatology) Assessment/Plan Education and counseling provided: 
Are appropriate based on today's review and evaluation Diagnoses and all orders for this visit: 
 
1. Medicare annual wellness visit, subsequent 2. Hypertension, benign -     AMB POC URINALYSIS DIP STICK AUTO W/ MICRO  
-     METABOLIC PANEL, COMPREHENSIVE 3. Dyslipidemia -     LIPID PANEL 
 
4. IGT (impaired glucose tolerance) 
-     HEMOGLOBIN A1C WITH EAG 
 
5. Fatigue, unspecified type -     AMB POC COMPLETE CBC,AUTOMATED ENTER 6. Screening for alcoholism -     [place] ANNUAL ALCOHOL SCREEN 15 MIN 
 
7. Screening for depression 
-     Shasta Gorman Health Maintenance Due Topic Date Due  Shingrix Vaccine Age 50> (1 of 2) 09/06/1993  MEDICARE YEARLY EXAM  12/14/2018 Review of the patient's immunizations indicates that she did have her shingles vaccine updated recently and I have asked her to provide the dates that we can document this on her record. She had a bone density test done today. She sees her gynecologist on a biannual basis and has her mammogram done yearly per [facility].  
 
Celena Churchill MD

## 2019-01-10 LAB
ALBUMIN SERPL-MCNC: 4.7 G/DL (ref 3.5–4.8)
ALBUMIN/GLOB SERPL: 1.8 {RATIO} (ref 1.2–2.2)
ALP SERPL-CCNC: 87 IU/L (ref 39–117)
ALT SERPL-CCNC: 20 IU/L (ref 0–32)
AST SERPL-CCNC: 24 IU/L (ref 0–40)
BILIRUB SERPL-MCNC: 0.8 MG/DL (ref 0–1.2)
BUN SERPL-MCNC: 18 MG/DL (ref 8–27)
BUN/CREAT SERPL: 20 (ref 12–28)
CALCIUM SERPL-MCNC: 11 MG/DL (ref 8.7–10.3)
CHLORIDE SERPL-SCNC: 100 MMOL/L (ref 96–106)
CHOLEST SERPL-MCNC: 284 MG/DL (ref 100–199)
CO2 SERPL-SCNC: 22 MMOL/L (ref 20–29)
CREAT SERPL-MCNC: 0.88 MG/DL (ref 0.57–1)
EST. AVERAGE GLUCOSE BLD GHB EST-MCNC: 128 MG/DL
GLOBULIN SER CALC-MCNC: 2.6 G/DL (ref 1.5–4.5)
GLUCOSE SERPL-MCNC: 125 MG/DL (ref 65–99)
HBA1C MFR BLD: 6.1 % (ref 4.8–5.6)
HDLC SERPL-MCNC: 40 MG/DL
LDLC SERPL CALC-MCNC: 209 MG/DL (ref 0–99)
POTASSIUM SERPL-SCNC: 3.9 MMOL/L (ref 3.5–5.2)
PROT SERPL-MCNC: 7.3 G/DL (ref 6–8.5)
SODIUM SERPL-SCNC: 142 MMOL/L (ref 134–144)
TRIGL SERPL-MCNC: 177 MG/DL (ref 0–149)
VLDLC SERPL CALC-MCNC: 35 MG/DL (ref 5–40)

## 2019-07-11 ENCOUNTER — OFFICE VISIT (OUTPATIENT)
Dept: INTERNAL MEDICINE CLINIC | Age: 74
End: 2019-07-11

## 2019-07-11 VITALS
TEMPERATURE: 98.1 F | HEART RATE: 82 BPM | WEIGHT: 125.5 LBS | SYSTOLIC BLOOD PRESSURE: 126 MMHG | OXYGEN SATURATION: 93 % | HEIGHT: 63 IN | RESPIRATION RATE: 18 BRPM | BODY MASS INDEX: 22.24 KG/M2 | DIASTOLIC BLOOD PRESSURE: 88 MMHG

## 2019-07-11 DIAGNOSIS — I73.9 CLAUDICATION OF LEFT LOWER EXTREMITY (HCC): ICD-10-CM

## 2019-07-11 DIAGNOSIS — E78.5 DYSLIPIDEMIA: ICD-10-CM

## 2019-07-11 DIAGNOSIS — I10 HYPERTENSION, BENIGN: Primary | ICD-10-CM

## 2019-07-11 DIAGNOSIS — R73.02 IGT (IMPAIRED GLUCOSE TOLERANCE): ICD-10-CM

## 2019-07-11 PROBLEM — M81.0 AGE-RELATED OSTEOPOROSIS WITHOUT CURRENT PATHOLOGICAL FRACTURE: Status: ACTIVE | Noted: 2019-07-11

## 2019-07-11 NOTE — PROGRESS NOTES
Richi Bocanegra is a 74 y.o. female presenting for Hypertension (6 mo fu)  . 1. Have you been to the ER, urgent care clinic since your last visit? Hospitalized since your last visit? No    2. Have you seen or consulted any other health care providers outside of the [address] [place] [address] since your last visit? Include any pap smears or colon screening. No    Fall Risk Assessment, last 12 mths 7/11/2019   Able to walk? Yes   Fall in past 12 months? Yes   Fall with injury? -   Number of falls in past 12 months 1   Fall Risk Score -         Abuse Screening Questionnaire 7/11/2019   Do you ever feel afraid of your partner? N   Are you in a relationship with someone who physically or mentally threatens you? N   Is it safe for you to go home? Y       3 most recent PHQ Screens 12/13/2017   Little interest or pleasure in doing things Not at all   Feeling down, depressed, irritable, or hopeless Not at all   Total Score PHQ 2 0       There are no discontinued medications.

## 2019-07-11 NOTE — PROGRESS NOTES
This note will not be viewable in [address] Monet Rai is a 74 y.o. female and presents with Hypertension (6 mo fu)  . Subjective:    Mrs. Betty Modi presents today for follow-up of hypertension, impaired glucose tolerance, hyperlipidemia, and follow-up of bone density test results. Her bone density done several months ago showed a significant reduction in her T score involving her radius of her forearm. This was consistent with osteoporosis. She remains very active and gets an adequate amount of calcium and vitamin D in her diet. She has noticed discomfort in her left leg with exertion and after walking for a significant amount of time. This requires her to rest and her symptoms will subside. She notes this is worse when walking on an incline. If she mows her lawn on a level surface it is not as noticeable as when she mows on an incline in the front of her yard. She denies any shortness of breath, chest pain, palpitations, PND, orthopnea, or pedal edema. Review of Systems  Constitutional:   Eyes:   negative for visual disturbance and irritation  ENT:   negative for tinnitus,sore throat,nasal congestion,ear pains. hoarseness  Respiratory:  negative for cough, hemoptysis, dyspnea,wheezing  CV:   negative for chest pain, palpitations, lower extremity edema  GI:   negative for nausea, vomiting, diarrhea, abdominal pain,melena  Endo:               negative for polyuria,polydipsia,polyphagia,heat intolerance  Genitourinary: negative for frequency, dysuria and hematuria  Integumentary: negative for rash and pruritus  Hematologic:  negative for easy bruising and gum/nose bleeding  Musculoskel: negative for myalgias, arthralgias, back pain, muscle weakness, joint pain  Neurological:  negative for headaches, dizziness, vertigo, memory problems and gait   Behavl/Psych: negative for feelings of anxiety, depression, mood changes    Past Medical History:   Diagnosis Date    Age-related osteoporosis without current pathological fracture 7/11/2019    Cataract 12/11/2017    Chronic kidney insufficiency 12/11/2017    Depression 12/11/2017    Dyslipidemia 12/11/2017    Fatigue 12/11/2017    GERD (gastroesophageal reflux disease)     Glaucoma 12/11/2017    Gout 12/11/2017    Hypertension     Hypertension, benign 12/11/2017    IGT (impaired glucose tolerance) 1/9/2019    On statin therapy 12/11/2017    Pre-ulcerative corn or callous 12/11/2017    Right foot injury 12/11/2017     Past Surgical History:   Procedure Laterality Date    COLONOSCOPY N/A 1/6/2017    COLON[facility] at [facility] ENDOSCOPY    HX HEENT      eye surgery    [place] COLON CA SCRN NOT HI RSK IND  1/6/2017          Social History     Socioeconomic History    Marital status:      Spouse name: Not on file    Number of children: Not on file    Years of education: Not on file    Highest education level: Not on file   Tobacco Use    Smoking status: Former Smoker    Smokeless tobacco: Never Used   Substance and Sexual Activity    Alcohol use: Yes     Alcohol/week: 0.6 oz     Types: 1 Glasses of wine per week    Drug use: No     History reviewed. No pertinent family history. Current Outpatient Medications   Medication Sig Dispense Refill    atenolol (TENORMIN) 50 mg tablet Take 1 Tab by mouth two (2) times a day. 180 Tab 3    ascorbic acid, vitamin C, (VITAMIN C) 250 mg tablet Take 250 mg by mouth daily.  CALCIUM CARBONATE/VITAMIN D3 (CALCIUM+D PO) Take 1 Tab by mouth two (2) times a day.  aspirin delayed-release 81 mg tablet Take  by mouth daily.        Allergies   Allergen Reactions    Diclofenac Unknown (comments)     Patient reports she is not allergic       Objective:  Visit Vitals  /88 (BP 1 Location: Left arm, BP Patient Position: Sitting)   Pulse 82   Temp 98.1 °F (36.7 °C) (Oral)   Resp 18   Ht 5' 3\" (1.6 m)   Wt 125 lb 8 oz (56.9 kg)   SpO2 93%   BMI 22.23 kg/m² Physical Exam:   General appearance - alert, well appearing, and in no distress  Mental status - alert, oriented to person, place, and time  EYE-TATYANA, EOMI, fundi normal, corneas normal, no foreign bodies  ENT-ENT exam normal, no neck nodes or sinus tenderness  Nose - normal and patent, no erythema, discharge or polyps  Mouth - mucous membranes moist, pharynx normal without lesions  Neck - supple, no significant adenopathy   Chest - clear to auscultation, no wheezes, rales or rhonchi, symmetric air entry   Heart - normal rate, regular rhythm, normal S1, S2, no murmurs, rubs, clicks or gallops   Abdomen - soft, nontender, nondistended, no masses or organomegaly  Lymph- no adenopathy palpable  Ext-peripheral pulses diminished bilateral lower extremities, decreased capillary refill, no pedal edema, no clubbing or cyanosis, varicosities and superficial veins of both lower extremities  Skin-Warm and dry. no hyperpigmentation, vitiligo, or suspicious lesions  Neuro -alert, oriented, normal speech, no focal findings or movement disorder noted  Musculoskeletal- FROM, no bony abnormalities, no point tenderness    No results found for this visit on 07/11/19. All results for lab orders may not have been returned by the time this encountered was closed. Assessment/Plan:       ICD-10-CM ICD-9-CM    1. Hypertension, benign O47 702.3 METABOLIC PANEL, COMPREHENSIVE      URINALYSIS W/O MICRO   2. IGT (impaired glucose tolerance) R73.02 790.22 HEMOGLOBIN A1C WITH EAG   3.  Dyslipidemia E78.5 272.4 LIPID PANEL   4. Claudication of left lower extremity (HCC) I73.9 443.9 REFERRAL TO VASCULAR SURGERY       Orders Placed This Encounter    LIPID PANEL ([facility])     Standing Status:   Future     Standing Expiration Date:   2/8/9002    METABOLIC PANEL, COMPREHENSIVE ([facility])     Standing Status:   Future     Standing Expiration Date:   8/8/2019    HEMOGLOBIN A1C WITH EAG     Standing Status:   Future Standing Expiration Date:   8/8/2019    URINALYSIS W/O MICRO ([facility])     Standing Status:   Future     Standing Expiration Date:   8/8/2019   [facility] [id number]     Referral Priority:   Routine     Referral Type:   Consultation     Referral Reason:   Specialty Services Required     Referral Location:   [facility]     Referred to Provider:   Maddie Mccoy MD     Number of Visits Requested:   1       Plan:    Refer to vascular surgery for further evaluation given history of claudication involving her left leg. It is not clear if this is indeed vascular or musculoskeletal or neurogenic claudication. We will continue to monitor her bone density going forward and will discuss treatment options further on return. Continue other medications as prescribed. The patient notes that she has discontinued taking aspirin daily. I have informed her that she has risk factors for coronary disease and stroke and may benefit from a baby aspirin daily. If she has any arterial insufficiency this may also be indicated. Plan follow-up in 6 months unless otherwise indicated. I have reviewed with the patient details of the assessment and plan and all questions were answered. Relevent patient education was performed. Verbal and/or written instructions (see AVS) provided. The most recent lab findings were reviewed with the patient. Plan was discussed with patient who verbal expressed understanding. An After Visit Summary was printed and given to the patient.       David Gregg MD

## 2019-07-15 ENCOUNTER — APPOINTMENT (OUTPATIENT)
Dept: INTERNAL MEDICINE CLINIC | Age: 74
End: 2019-07-15

## 2019-07-15 DIAGNOSIS — R73.02 IGT (IMPAIRED GLUCOSE TOLERANCE): ICD-10-CM

## 2019-07-15 DIAGNOSIS — I10 HYPERTENSION, BENIGN: ICD-10-CM

## 2019-07-15 DIAGNOSIS — E78.5 DYSLIPIDEMIA: ICD-10-CM

## 2019-07-15 LAB
A-G RATIO,AGRAT: 1.9 RATIO
ALBUMIN SERPL-MCNC: 4.7 G/DL (ref 3.9–5.4)
ALP SERPL-CCNC: 79 U/L (ref 38–126)
ALT SERPL-CCNC: 24 U/L (ref 9–52)
ANION GAP SERPL CALC-SCNC: 13 MMOL/L
AST SERPL W P-5'-P-CCNC: 27 U/L (ref 14–36)
BILIRUB SERPL-MCNC: 0.7 MG/DL (ref 0.2–1.3)
BILIRUB UR QL: NEGATIVE
BUN SERPL-MCNC: 23 MG/DL (ref 7–17)
BUN/CREATININE RATIO,BUCR: 23 RATIO
CALCIUM SERPL-MCNC: 10.9 MG/DL (ref 8.4–10.2)
CHLORIDE SERPL-SCNC: 100 MMOL/L (ref 98–107)
CHOL/HDL RATIO,CHHD: 8 RATIO (ref 0–4)
CHOLEST SERPL-MCNC: 246 MG/DL (ref 0–200)
CLARITY: ABNORMAL
CO2 SERPL-SCNC: 28 MMOL/L (ref 22–32)
COLOR UR: ABNORMAL
CREAT SERPL-MCNC: 1 MG/DL (ref 0.7–1.2)
GLOBULIN,GLOB: 2.5
GLUCOSE 24H UR-MRATE: NEGATIVE G/(24.H)
GLUCOSE SERPL-MCNC: 103 MG/DL (ref 65–105)
HDLC SERPL-MCNC: 32 MG/DL (ref 35–130)
HGB UR QL STRIP: NEGATIVE
KETONES UR QL STRIP.AUTO: NEGATIVE
LDL/HDL RATIO,LDHD: 5 RATIO
LDLC SERPL CALC-MCNC: 168 MG/DL (ref 0–130)
LEUKOCYTE ESTERASE: NEGATIVE
NITRITE UR QL STRIP.AUTO: NEGATIVE
PH UR STRIP: 7 [PH] (ref 5–7)
POTASSIUM SERPL-SCNC: 4.5 MMOL/L (ref 3.6–5)
PROT SERPL-MCNC: 7.2 G/DL (ref 6.3–8.2)
PROT UR STRIP-MCNC: NEGATIVE MG/DL
SODIUM SERPL-SCNC: 141 MMOL/L (ref 137–145)
SP GR UR REFRACTOMETRY: 1.01 (ref 1–1.03)
TRIGL SERPL-MCNC: 228 MG/DL (ref 0–200)
UROBILINOGEN UR QL STRIP.AUTO: NEGATIVE
VLDLC SERPL CALC-MCNC: 46 MG/DL

## 2019-07-16 LAB
EST. AVERAGE GLUCOSE BLD GHB EST-MCNC: 117 MG/DL
HBA1C MFR BLD: 5.7 % (ref 4.8–5.6)

## 2019-10-11 RX ORDER — ATENOLOL 50 MG/1
TABLET ORAL
Qty: 180 TAB | Refills: 3 | Status: SHIPPED | OUTPATIENT
Start: 2019-10-11 | End: 2019-10-13 | Stop reason: SDUPTHER

## 2020-01-17 ENCOUNTER — OFFICE VISIT (OUTPATIENT)
Dept: INTERNAL MEDICINE CLINIC | Age: 75
End: 2020-01-17

## 2020-01-17 VITALS
HEART RATE: 64 BPM | SYSTOLIC BLOOD PRESSURE: 132 MMHG | HEIGHT: 63 IN | WEIGHT: 127.8 LBS | TEMPERATURE: 97.8 F | BODY MASS INDEX: 22.64 KG/M2 | DIASTOLIC BLOOD PRESSURE: 78 MMHG | OXYGEN SATURATION: 98 %

## 2020-01-17 DIAGNOSIS — Z13.31 SCREENING FOR DEPRESSION: ICD-10-CM

## 2020-01-17 DIAGNOSIS — I10 HYPERTENSION, BENIGN: ICD-10-CM

## 2020-01-17 DIAGNOSIS — E78.5 DYSLIPIDEMIA: ICD-10-CM

## 2020-01-17 DIAGNOSIS — Z13.39 SCREENING FOR ALCOHOLISM: ICD-10-CM

## 2020-01-17 DIAGNOSIS — Z00.00 MEDICARE ANNUAL WELLNESS VISIT, SUBSEQUENT: Primary | ICD-10-CM

## 2020-01-17 DIAGNOSIS — R73.02 IGT (IMPAIRED GLUCOSE TOLERANCE): ICD-10-CM

## 2020-01-17 DIAGNOSIS — I73.9 PERIPHERAL ARTERIAL DISEASE (HCC): ICD-10-CM

## 2020-01-17 DIAGNOSIS — M81.0 AGE-RELATED OSTEOPOROSIS WITHOUT CURRENT PATHOLOGICAL FRACTURE: ICD-10-CM

## 2020-01-17 DIAGNOSIS — Z13.6 SCREENING FOR ISCHEMIC HEART DISEASE: ICD-10-CM

## 2020-01-17 NOTE — PATIENT INSTRUCTIONS
Medicare Wellness Visit, Female The best way to live healthy is to have a lifestyle where you eat a well-balanced diet, exercise regularly, limit alcohol use, and quit all forms of tobacco/nicotine, if applicable. Regular preventive services are another way to keep healthy. Preventive services (vaccines, screening tests, monitoring & exams) can help personalize your care plan, which helps you manage your own care. Screening tests can find health problems at the earliest stages, when they are easiest to treat. [facility] follows the current, evidence-based guidelines published by the [facility] (RUSTST[employer]) when recommending preventive services for our patients. Because we follow these guidelines, sometimes recommendations change over time as research supports it. (For example, mammograms used to be recommended annually. Even though Medicare will still pay for an annual mammogram, the newer guidelines recommend a mammogram every two years for women of average risk). Of course, you and your doctor may decide to screen more often for some diseases, based on your risk and your co-morbidities (chronic disease you are already diagnosed with). Preventive services for you include: - Medicare offers their members a free annual wellness visit, which is time for you and your primary care provider to discuss and plan for your preventive service needs. Take advantage of this benefit every year! 
-All adults over the age of 72 should receive the recommended pneumonia vaccines. Current USPSTF guidelines recommend a series of two vaccines for the best pneumonia protection.  
-All adults should have a flu vaccine yearly and a tetanus vaccine every 10 years.  
-All adults age 48 and older should receive the shingles vaccines (series of two vaccines). -All adults age 38-68 who are overweight should have a diabetes screening test once every three years. -All adults born between [birth year] and 1965 should be screened once for Hepatitis C. 
-Other screening tests and preventive services for persons with diabetes include: an eye exam to screen for diabetic retinopathy, a kidney function test, a foot exam, and stricter control over your cholesterol.  
-Cardiovascular screening for adults with routine risk involves an electrocardiogram (ECG) at intervals determined by your doctor.  
-Colorectal cancer screenings should be done for adults age 54-65 with no increased risk factors for colorectal cancer. There are a number of acceptable methods of screening for this type of cancer. Each test has its own benefits and drawbacks. Discuss with your doctor what is most appropriate for you during your annual wellness visit. The different tests include: colonoscopy (considered the best screening method), a fecal occult blood test, a fecal DNA test, and sigmoidoscopy. 
 
-A bone mass density test is recommended when a woman turns 65 to screen for osteoporosis. This test is only recommended one time, as a screening. Some providers will use this same test as a disease monitoring tool if you already have osteoporosis. -Breast cancer screenings are recommended every other year for women of normal risk, age 54-69. 
-Cervical cancer screenings for women over age 72 are only recommended with certain risk factors. Here is a list of your current Health Maintenance items (your personalized list of preventive services) with a due date: 
Health Maintenance Due Topic Date Due  
 Annual Well Visit  01/10/2020 All Medicare beneficiaries aged 48 and older are covered; however, when a beneficiary is at high risk, there is no minimum age required to receive a screening colonoscopy or a barium enema rendered as an alternative to a screening colonoscopy. The following are the coverage criteria for each colorectal cancer screening test/procedure. Screening FOBT Medicare provides coverage of a screening FOBT annually (i.e., at least 11 months have passed following the month in which the last covered screening FOBT was performed) for beneficiaries aged 48 and older. This screening requires a written order from the beneficiarys attending physician. NOTE: Medicare will only provide coverage for one FOBT per year: either HCPCS code  or CPT code 10861, but not both. Screening Colonoscopy For Beneficiaries at [facility] for Developing Colorectal Cancer Medicare provides coverage of a screening colonoscopy (HCPCS code ) once every 2 years for beneficiaries at high risk for developing colorectal cancer (i.e., at least 23 months have passed following the month in which the last covered screening colonoscopy [HCPCS code ] was performed). For Beneficiaries Not at [facility] for Developing Colorectal Cancer Medicare provides coverage of a screening colonoscopy (HCPCS code ) for beneficiaries who do not meet the criteria for being at high risk for developing colorectal cancer once every 10 years (i.e., at least 119 months have passed following the month in which the last covered screening colonoscopy [HCPCS code ] was performed). If the beneficiary otherwise qualifies to have a covered screening colonoscopy (HCPCS code ) based on the above but has had a covered screening flexible sigmoidoscopy (HCPCS code ), then Medicare may cover a screening colonoscopy (HCPCS code ) only after at least 47 months have passed following the month in which the last covered screening flexible sigmoidoscopy (HCPCS code ) was performed.

## 2020-01-17 NOTE — PROGRESS NOTES
This note will not be viewable in [address] Kat is a 66 y.o. female and presents with Annual Wellness Visit and Hypertension  . Subjective:    Rhoda London presents today for follow-up of hypertension, peripheral artery disease, hyperlipidemia, osteoporosis. She is doing well on her current medical regimen which includes atenolol 50 mg daily. She remains on aspirin 81 mg daily. She had been on Plavix after her right femoral artery stent was placed but is no longer on this medication currently. She has no claudication and is active in hiking. She denies any shortness of breath, chest pain, palpitations, PND, orthopnea, or pedal edema. Review of Systems  Constitutional:   Eyes:   negative for visual disturbance and irritation  ENT:   negative for tinnitus,sore throat,nasal congestion,ear pains. hoarseness  Respiratory:  negative for cough, hemoptysis, dyspnea,wheezing  CV:   negative for chest pain, palpitations, lower extremity edema  GI:   negative for nausea, vomiting, diarrhea, abdominal pain,melena  Endo:               negative for polyuria,polydipsia,polyphagia,heat intolerance  Genitourinary: negative for frequency, dysuria and hematuria  Integumentary: negative for rash and pruritus  Hematologic:  negative for easy bruising and gum/nose bleeding  Musculoskel: negative for myalgias, arthralgias, back pain, muscle weakness, joint pain  Neurological:  negative for headaches, dizziness, vertigo, memory problems and gait   Behavl/Psych: negative for feelings of anxiety, depression, mood changes    Past Medical History:   Diagnosis Date    Age-related osteoporosis without current pathological fracture 7/11/2019    Cataract 12/11/2017    Chronic kidney insufficiency 12/11/2017    Depression 12/11/2017    Dyslipidemia 12/11/2017    Fatigue 12/11/2017    GERD (gastroesophageal reflux disease)     Glaucoma 12/11/2017    Gout 12/11/2017    Hypertension     Hypertension, benign 12/11/2017    IGT (impaired glucose tolerance) 1/9/2019    On statin therapy 12/11/2017    Peripheral arterial disease ([address] 1/17/2020    Status post stent right SFA    Pre-ulcerative corn or callous 12/11/2017    Right foot injury 12/11/2017     Past Surgical History:   Procedure Laterality Date    COLONOSCOPY N/A 1/6/2017    COLONOSCOPY performed by Cristina Alcaraz MD at hospitals ENDOSCOPY    HX HEENT      eye surgery    UT COLON CA SCRN NOT HI RSK IND  1/6/2017          Social History     Socioeconomic History    Marital status:      Spouse name: Not on file    Number of children: Not on file    Years of education: Not on file    Highest education level: Not on file   Tobacco Use    Smoking status: Former Smoker    Smokeless tobacco: Never Used   Substance and Sexual Activity    Alcohol use: Yes     Alcohol/week: 1.0 standard drinks     Types: 1 Glasses of wine per week    Drug use: No     No family history on file. Current Outpatient Medications   Medication Sig Dispense Refill    atenolol (TENORMIN) 50 mg tablet TAKE 1 TABLET BY MOUTH TWICE DAILY 180 Tab 3    ascorbic acid, vitamin C, (VITAMIN C) 250 mg tablet Take 250 mg by mouth daily.  CALCIUM CARBONATE/VITAMIN D3 (CALCIUM+D PO) Take 1 Tab by mouth two (2) times a day.  aspirin delayed-release 81 mg tablet Take  by mouth daily.        Allergies   Allergen Reactions    Diclofenac Unknown (comments)     Patient reports she is not allergic       Objective:  Visit Vitals  /78 (BP 1 Location: Left arm, BP Patient Position: Sitting)   Pulse 64   Temp 97.8 °F (36.6 °C) (Oral)   Ht 5' 3\" (1.6 m)   Wt 127 lb 12.8 oz (58 kg)   SpO2 98%   BMI 22.64 kg/m²     Physical Exam:   General appearance - alert, well appearing, and in no distress  Mental status - alert, oriented to person, place, and time  EYE-TATYANA, EOMI, fundi normal, corneas normal, no foreign bodies  ENT-ENT exam normal, no neck nodes or sinus tenderness  Nose - normal and patent, no erythema, discharge or polyps  Mouth - mucous membranes moist, pharynx normal without lesions  Neck - supple, no significant adenopathy   Chest - clear to auscultation, no wheezes, rales or rhonchi, symmetric air entry   Heart - normal rate, regular rhythm, normal S1, S2, no murmurs, rubs, clicks or gallops   Abdomen - soft, nontender, nondistended, no masses or organomegaly  Lymph- no adenopathy palpable  Ext-peripheral pulses normal, no pedal edema, no clubbing or cyanosis  Skin-Warm and dry. no hyperpigmentation, vitiligo, or suspicious lesions  Neuro -alert, oriented, normal speech, no focal findings or movement disorder noted  Musculoskeletal- FROM, no bony abnormalities, no point tenderness    No results found for this visit on 01/17/20. All results for lab orders may not have been returned by the time this encountered was closed. Assessment/Plan:       ICD-10-CM ICD-9-CM    1. Hypertension, benign I75 672.3 METABOLIC PANEL, COMPREHENSIVE      URINALYSIS W/ RFLX MICROSCOPIC   2. IGT (impaired glucose tolerance) R73.02 790.22 HEMOGLOBIN A1C WITH EAG   3. Age-related osteoporosis without current pathological fracture M81.0 733.01 VITAMIN D, 25 HYDROXY   4. Dyslipidemia E78.5 272.4 LIPID PANEL   5. Peripheral arterial disease (HCC) I73.9 443.9        Orders Placed This Encounter    LIPID PANEL    METABOLIC PANEL, COMPREHENSIVE    URINALYSIS W/ RFLX MICROSCOPIC    VITAMIN D, 25 HYDROXY    HEMOGLOBIN A1C WITH EAG         Plan:    Continue Tenormin 50 mg daily for hypertension. Continue aspirin 81 mg daily. The patient has mild hyperlipidemia with a excellent HDL cholesterol. May consider low-dose statin therapy given her history of peripheral arterial disease. She has osteoporosis based on her previous bone density. She is very active and gets adequate amounts of calcium and vitamin D.   Follow-up bone density test at appropriate interval.     I have reviewed with the patient details of the assessment and plan and all questions were answered. Relevent patient education was performed. Verbal and/or written instructions (see AVS) provided. The most recent lab findings were reviewed with the patient. Plan was discussed with patient who verbal expressed understanding. An After Visit Summary was printed and given to the patient. Jacobo Kennedy MD    This is the Subsequent Medicare Annual Wellness Exam, performed 12 months or more after the Initial AWV or the last Subsequent AWV    I have reviewed the patient's medical history in detail and updated the computerized patient record.      History     Patient Active Problem List   Diagnosis Code    Glaucoma H40.9    Dyslipidemia E78.5    Hypertension, benign I10    Cataract H26.9    Right foot injury S99.921A    IGT (impaired glucose tolerance) R73.02    Age-related osteoporosis without current pathological fracture M81.0    Peripheral arterial disease ([address] I73.9     Past Medical History:   Diagnosis Date    Age-related osteoporosis without current pathological fracture 7/11/2019    Cataract 12/11/2017    Chronic kidney insufficiency 12/11/2017    Depression 12/11/2017    Dyslipidemia 12/11/2017    Fatigue 12/11/2017    GERD (gastroesophageal reflux disease)     Glaucoma 12/11/2017    Gout 12/11/2017    Hypertension     Hypertension, benign 12/11/2017    IGT (impaired glucose tolerance) 1/9/2019    On statin therapy 12/11/2017    Peripheral arterial disease ([address] 1/17/2020    Status post stent right SFA    Pre-ulcerative corn or callous 12/11/2017    Right foot injury 12/11/2017      Past Surgical History:   Procedure Laterality Date    COLONOSCOPY N/A 1/6/2017    [facility]    HX HEENT      eye surgery    NE COLON CA SCRN NOT HI RSK IND  1/6/2017          Current Outpatient Medications   Medication Sig Dispense Refill    atenolol (TENORMIN) 50 mg tablet TAKE 1 TABLET BY MOUTH TWICE DAILY 180 Tab 3    ascorbic acid, vitamin C, (VITAMIN C) 250 mg tablet Take 250 mg by mouth daily.  CALCIUM CARBONATE/VITAMIN D3 (CALCIUM+D PO) Take 1 Tab by mouth two (2) times a day.  aspirin delayed-release 81 mg tablet Take  by mouth daily. Allergies   Allergen Reactions    Diclofenac Unknown (comments)     Patient reports she is not allergic       No family history on file. Social History     Tobacco Use    Smoking status: Former Smoker    Smokeless tobacco: Never Used   Substance Use Topics    Alcohol use: Yes     Alcohol/week: 1.0 standard drinks     Types: 1 Glasses of wine per week       Depression Risk Factor Screening:     3 most recent PHQ Screens 1/17/2020   Little interest or pleasure in doing things Not at all   Feeling down, depressed, irritable, or hopeless Not at all   Total Score PHQ 2 0   Trouble falling or staying asleep, or sleeping too much Not at all   Feeling tired or having little energy Not at all   Poor appetite, weight loss, or overeating Not at all   Feeling bad about yourself - or that you are a failure or have let yourself or your family down Not at all   Trouble concentrating on things such as school, work, reading, or watching TV Not at all   Moving or speaking so slowly that other people could have noticed; or the opposite being so fidgety that others notice Not at all   Thoughts of being better off dead, or hurting yourself in some way Not at all   PHQ 9 Score 0       Alcohol Risk Factor Screening:   Do you average 1 drink per night or more than 7 drinks a week:  No    On any one occasion in the past three months have you have had more than 3 drinks containing alcohol:  No      Functional Ability and Level of Safety:   Hearing: Hearing is good. Activities of Daily Living: The home contains: no safety equipment.   Patient does total self care    Ambulation: with no difficulty    Fall Risk:  Fall Risk Assessment, last 12 mths 1/17/2020   Able to walk? Yes   Fall in past 12 months? No   Fall with injury? -   Number of falls in past 12 months -   Fall Risk Score -       Abuse Screen:  Patient is not abused    Cognitive Screening   Has your family/caregiver stated any concerns about your memory: no  Cognitive Screening: Abnormal - Verbal Fluency Test    Patient Care Team   Patient Care Team:  Adell Holstein, MD as PCP - General (Dermatology)  Antoinette Agosto MD as PCP - REHABILITATION [facility] Empaneled Provider    Assessment/Plan   Education and counseling provided:  Are appropriate based on today's review and evaluation    Diagnoses and all orders for this visit:    1. Medicare annual wellness visit, subsequent    2. Hypertension, benign  -     METABOLIC PANEL, COMPREHENSIVE  -     URINALYSIS W/ RFLX MICROSCOPIC    3. IGT (impaired glucose tolerance)  -     HEMOGLOBIN A1C WITH EAG    4. Age-related osteoporosis without current pathological fracture  -     VITAMIN D, 25 HYDROXY    5. Dyslipidemia  -     LIPID PANEL    6. Peripheral arterial disease ([address]    7. Screening for alcoholism  -     ID ANNUAL ALCOHOL SCREEN 15 MIN    8. Screening for depression  -     DEPRESSION SCREEN ANNUAL    9.  Screening for ischemic heart disease        Health Maintenance Due   Topic Date Due    MEDICARE YEARLY EXAM  01/10/2020

## 2020-01-17 NOTE — PROGRESS NOTES
Chief Complaint   Patient presents with    Annual Wellness Visit    Depression    Hypertension       Depression Risk Factor Screening:     3 most recent PHQ Screens 1/17/2020   Little interest or pleasure in doing things Not at all   Feeling down, depressed, irritable, or hopeless Not at all   Total Score PHQ 2 0   Trouble falling or staying asleep, or sleeping too much Not at all   Feeling tired or having little energy Not at all   Poor appetite, weight loss, or overeating Not at all   Feeling bad about yourself - or that you are a failure or have let yourself or your family down Not at all   Trouble concentrating on things such as school, work, reading, or watching TV Not at all   Moving or speaking so slowly that other people could have noticed; or the opposite being so fidgety that others notice Not at all   Thoughts of being better off dead, or hurting yourself in some way Not at all   PHQ 9 Score 0       Functional Ability and Level of Safety:     Activities of Daily Living  ADL Assessment 1/17/2020   Feeding yourself No Help Needed   Getting from bed to chair No Help Needed   Getting dressed No Help Needed   Bathing or showering No Help Needed   Walk across the room (includes cane/walker) No Help Needed   Using the telphone No Help Needed   Taking your medications No Help Needed   Preparing meals No Help Needed   Managing money (expenses/bills) No Help Needed   Moderately strenuous housework (laundry) No Help Needed   Shopping for personal items (toiletries/medicines) No Help Needed   Shopping for groceries No Help Needed   Driving No Help Needed   Climbing a flight of stairs No Help Needed   Getting to places beyond walking distances No Help Needed       Fall Risk  Fall Risk Assessment, last 12 mths 1/17/2020   Able to walk? Yes   Fall in past 12 months?  No   Fall with injury? -   Number of falls in past 12 months -   Fall Risk Score -       Abuse Screen  Abuse Screening Questionnaire 1/17/2020   Do you ever feel afraid of your partner? N   Are you in a relationship with someone who physically or mentally threatens you? N   Is it safe for you to go home? Y     Reviewed record in preparation for visit and have obtained necessary documentation. Identified pt with two pt identifiers(name and ). Chief Complaint   Patient presents with    Annual Wellness Visit    Depression    Hypertension      Wt Readings from Last 3 Encounters:   20 127 lb 12.8 oz (58 kg)   19 125 lb 8 oz (56.9 kg)   19 124 lb 6.4 oz (56.4 kg)     Temp Readings from Last 3 Encounters:   20 97.8 °F (36.6 °C) (Oral)   19 98.1 °F (36.7 °C) (Oral)   19 97.5 °F (36.4 °C) (Oral)     BP Readings from Last 3 Encounters:   20 165/84   19 126/88   19 128/78     Pulse Readings from Last 3 Encounters:   20 64   19 82   19 69       Coordination of Care Questionnaire:  :     1) Have you been to an emergency room, urgent care clinic since your last visit? No     Hospitalized since your last visit? No             2) Have you seen or consulted any other health care providers outside of [address] [place] [address] since your last visit?   No           Patient Care Team   Patient Care Team:  Maria Elena Evans MD as PCP - General (Dermatology)  Liliya Warner MD as PCP - [facility]

## 2020-01-18 LAB
25(OH)D3+25(OH)D2 SERPL-MCNC: 45.1 NG/ML (ref 30–100)
ALBUMIN SERPL-MCNC: 5 G/DL (ref 3.5–4.8)
ALBUMIN/GLOB SERPL: 2.1 {RATIO} (ref 1.2–2.2)
ALP SERPL-CCNC: 92 IU/L (ref 39–117)
ALT SERPL-CCNC: 17 IU/L (ref 0–32)
APPEARANCE UR: CLEAR
AST SERPL-CCNC: 24 IU/L (ref 0–40)
BILIRUB SERPL-MCNC: 0.8 MG/DL (ref 0–1.2)
BILIRUB UR QL STRIP: NEGATIVE
BUN SERPL-MCNC: 19 MG/DL (ref 8–27)
BUN/CREAT SERPL: 22 (ref 12–28)
CALCIUM SERPL-MCNC: 9.8 MG/DL (ref 8.7–10.3)
CHLORIDE SERPL-SCNC: 99 MMOL/L (ref 96–106)
CHOLEST SERPL-MCNC: 285 MG/DL (ref 100–199)
CO2 SERPL-SCNC: 22 MMOL/L (ref 20–29)
COLOR UR: YELLOW
CREAT SERPL-MCNC: 0.88 MG/DL (ref 0.57–1)
EST. AVERAGE GLUCOSE BLD GHB EST-MCNC: 126 MG/DL
GLOBULIN SER CALC-MCNC: 2.4 G/DL (ref 1.5–4.5)
GLUCOSE SERPL-MCNC: 107 MG/DL (ref 65–99)
GLUCOSE UR QL: NEGATIVE
HBA1C MFR BLD: 6 % (ref 4.8–5.6)
HDLC SERPL-MCNC: 39 MG/DL
HGB UR QL STRIP: NEGATIVE
KETONES UR QL STRIP: NEGATIVE
LDLC SERPL CALC-MCNC: 203 MG/DL (ref 0–99)
LEUKOCYTE ESTERASE UR QL STRIP: NEGATIVE
MICRO URNS: NORMAL
NITRITE UR QL STRIP: NEGATIVE
PH UR STRIP: 7 [PH] (ref 5–7.5)
POTASSIUM SERPL-SCNC: 4.1 MMOL/L (ref 3.5–5.2)
PROT SERPL-MCNC: 7.4 G/DL (ref 6–8.5)
PROT UR QL STRIP: NEGATIVE
SODIUM SERPL-SCNC: 139 MMOL/L (ref 134–144)
SP GR UR: 1.01 (ref 1–1.03)
TRIGL SERPL-MCNC: 217 MG/DL (ref 0–149)
UROBILINOGEN UR STRIP-MCNC: 0.2 MG/DL (ref 0.2–1)
VLDLC SERPL CALC-MCNC: 43 MG/DL (ref 5–40)

## 2020-07-20 ENCOUNTER — OFFICE VISIT (OUTPATIENT)
Dept: INTERNAL MEDICINE CLINIC | Age: 75
End: 2020-07-20

## 2020-07-20 VITALS
DIASTOLIC BLOOD PRESSURE: 80 MMHG | WEIGHT: 127 LBS | TEMPERATURE: 97.2 F | HEIGHT: 63 IN | BODY MASS INDEX: 22.5 KG/M2 | SYSTOLIC BLOOD PRESSURE: 130 MMHG | HEART RATE: 60 BPM | RESPIRATION RATE: 16 BRPM | OXYGEN SATURATION: 100 %

## 2020-07-20 DIAGNOSIS — I73.9 PERIPHERAL ARTERIAL DISEASE (HCC): ICD-10-CM

## 2020-07-20 DIAGNOSIS — I10 HYPERTENSION, BENIGN: Primary | ICD-10-CM

## 2020-07-20 DIAGNOSIS — R73.02 IGT (IMPAIRED GLUCOSE TOLERANCE): ICD-10-CM

## 2020-07-20 DIAGNOSIS — E78.5 DYSLIPIDEMIA: ICD-10-CM

## 2020-07-20 DIAGNOSIS — M81.0 AGE-RELATED OSTEOPOROSIS WITHOUT CURRENT PATHOLOGICAL FRACTURE: ICD-10-CM

## 2020-07-20 LAB
A-G RATIO,AGRAT: 1.8 RATIO
ALBUMIN SERPL-MCNC: 4.8 G/DL (ref 3.9–5.4)
ALP SERPL-CCNC: 77 U/L (ref 38–126)
ALT SERPL-CCNC: 21 U/L (ref 0–35)
ANION GAP SERPL CALC-SCNC: 15 MMOL/L
AST SERPL W P-5'-P-CCNC: 30 U/L (ref 14–36)
BILIRUB SERPL-MCNC: 0.7 MG/DL (ref 0.2–1.3)
BILIRUB UR QL: NEGATIVE
BUN SERPL-MCNC: 24 MG/DL (ref 7–17)
BUN/CREATININE RATIO,BUCR: 27 RATIO
CALCIUM SERPL-MCNC: 11.1 MG/DL (ref 8.4–10.2)
CHLORIDE SERPL-SCNC: 103 MMOL/L (ref 98–107)
CHOL/HDL RATIO,CHHD: 6 RATIO (ref 0–4)
CHOLEST SERPL-MCNC: 285 MG/DL (ref 0–200)
CLARITY: CLEAR
CO2 SERPL-SCNC: 26 MMOL/L (ref 22–32)
COLOR UR: NORMAL
CREAT SERPL-MCNC: 0.9 MG/DL (ref 0.7–1.2)
GLOBULIN,GLOB: 2.7
GLUCOSE 24H UR-MRATE: NEGATIVE G/(24.H)
GLUCOSE SERPL-MCNC: 112 MG/DL (ref 65–105)
HBA1C MFR BLD HPLC: 5.8 % (ref 4–5.7)
HDLC SERPL-MCNC: 47 MG/DL (ref 35–130)
HGB UR QL STRIP: NEGATIVE
KETONES UR QL STRIP.AUTO: NEGATIVE
LDL/HDL RATIO,LDHD: 4 RATIO
LDLC SERPL CALC-MCNC: 194 MG/DL (ref 0–130)
LEUKOCYTE ESTERASE: NEGATIVE
NITRITE UR QL STRIP.AUTO: NEGATIVE
PH UR STRIP: 7 [PH] (ref 5–7)
POTASSIUM SERPL-SCNC: 3.9 MMOL/L (ref 3.6–5)
PROT SERPL-MCNC: 7.5 G/DL (ref 6.3–8.2)
PROT UR STRIP-MCNC: NEGATIVE MG/DL
SODIUM SERPL-SCNC: 144 MMOL/L (ref 137–145)
SP GR UR REFRACTOMETRY: 1.01 (ref 1–1.03)
TRIGL SERPL-MCNC: 222 MG/DL (ref 0–200)
UROBILINOGEN UR QL STRIP.AUTO: NEGATIVE
VLDLC SERPL CALC-MCNC: 44 MG/DL

## 2020-07-20 NOTE — PROGRESS NOTES
Angela Benjamin presents today at the clinic for    Chief Complaint   Patient presents with    Hypertension     6 mo follow up        Wt Readings from Last 3 Encounters:   07/20/20 127 lb (57.6 kg)   01/17/20 127 lb 12.8 oz (58 kg)   07/11/19 125 lb 8 oz (56.9 kg)     Temp Readings from Last 3 Encounters:   07/20/20 97.2 °F (36.2 °C) (Oral)   01/17/20 97.8 °F (36.6 °C) (Oral)   07/11/19 98.1 °F (36.7 °C) (Oral)     BP Readings from Last 3 Encounters:   07/20/20 130/80   01/17/20 132/78   07/11/19 126/88     Pulse Readings from Last 3 Encounters:   07/20/20 60   01/17/20 64   07/11/19 82       Health Maintenance Due   Topic    GLAUCOMA SCREENING Q2Y          Learning Assessment:  :     Learning Assessment 12/13/2017   PRIMARY LEARNER Patient   HIGHEST LEVEL OF EDUCATION - PRIMARY LEARNER  SOME COLLEGE   BARRIERS PRIMARY LEARNER VISUAL   CO-LEARNER CAREGIVER No   PRIMARY LANGUAGE ENGLISH   LEARNER PREFERENCE PRIMARY DEMONSTRATION   ANSWERED BY patient   RELATIONSHIP SELF       Depression Screening:  :     3 most recent PHQ Screens 1/17/2020   Little interest or pleasure in doing things Not at all   Feeling down, depressed, irritable, or hopeless Not at all   Total Score PHQ 2 0   Trouble falling or staying asleep, or sleeping too much Not at all   Feeling tired or having little energy Not at all   Poor appetite, weight loss, or overeating Not at all   Feeling bad about yourself - or that you are a failure or have let yourself or your family down Not at all   Trouble concentrating on things such as school, work, reading, or watching TV Not at all   Moving or speaking so slowly that other people could have noticed; or the opposite being so fidgety that others notice Not at all   Thoughts of being better off dead, or hurting yourself in some way Not at all   PHQ 9 Score 0       Fall Risk Assessment:  :     Fall Risk Assessment, last 12 mths 1/17/2020   Able to walk? Yes   Fall in past 12 months? No   Fall with injury? -   Number of falls in past 12 months -   Fall Risk Score -       Abuse Screening:  :     Abuse Screening Questionnaire 1/17/2020 7/11/2019 12/13/2017   Do you ever feel afraid of your partner? N N N   Are you in a relationship with someone who physically or mentally threatens you? N N N   Is it safe for you to go home? Rosie Zhang       Coordination of Care Questionnaire:  :     1. Have you been to the ER, urgent care clinic since your last visit? Hospitalized since your last visit? no  2. Have you seen or consulted any other health care providers outside of the [address] [place] [address] since your last visit? Include any pap smears or colon screening. Patient saw her OB/GYN on 7/17/20 for her annual exam and she states she had her mammogram done that day also.

## 2020-07-20 NOTE — PROGRESS NOTES
This note will not be viewable in [address] Shaan Mantilla is a 66 y.o. female and presents with Hypertension (6 mo follow up)  . Subjective:  Mrs. Elsa Aranda presents today for follow-up of hypertension, impaired glucose tolerance, hyperlipidemia, peripheral arterial disease. She had a stent placed in her right femoral artery previously. She has no claudication. She denies any shortness of breath, chest pain, palpitations, PND, orthopnea, or pedal edema. She had a bone density test done a year and half ago that showed some osteopenia in her spine and hip and osteoporosis in her radius. Her risk of a hip fracture is greater than 3%. Review of Systems  Constitutional:   Eyes:   negative for visual disturbance and irritation  ENT:   negative for tinnitus,sore throat,nasal congestion,ear pains. hoarseness  Respiratory:  negative for cough, hemoptysis, dyspnea,wheezing  CV:   negative for chest pain, palpitations, lower extremity edema  GI:   negative for nausea, vomiting, diarrhea, abdominal pain,melena  Endo:               negative for polyuria,polydipsia,polyphagia,heat intolerance  Genitourinary: negative for frequency, dysuria and hematuria  Integumentary: negative for rash and pruritus  Hematologic:  negative for easy bruising and gum/nose bleeding  Musculoskel: negative for myalgias, arthralgias, back pain, muscle weakness, joint pain  Neurological:  negative for headaches, dizziness, vertigo, memory problems and gait   Behavl/Psych: negative for feelings of anxiety, depression, mood changes    Past Medical History:   Diagnosis Date    Age-related osteoporosis without current pathological fracture 7/11/2019    Cataract 12/11/2017    Chronic kidney insufficiency 12/11/2017    Depression 12/11/2017    Dyslipidemia 12/11/2017    Fatigue 12/11/2017    GERD (gastroesophageal reflux disease)     Glaucoma 12/11/2017    Gout 12/11/2017    Hypertension     Hypertension, benign 12/11/2017    IGT (impaired glucose tolerance) 1/9/2019    On statin therapy 12/11/2017    Peripheral arterial disease ([address] 1/17/2020    Status post stent right SFA    Pre-ulcerative corn or callous 12/11/2017    Right foot injury 12/11/2017     Past Surgical History:   Procedure Laterality Date    COLONOSCOPY N/A 1/6/2017    [facility]it [facility]    HX HEENT      eye surgery    [place] COLON CA SCRN NOT HI RSK IND  1/6/2017          Social History     Socioeconomic History    Marital status:      Spouse name: Not on file    Number of children: Not on file    Years of education: Not on file    Highest education level: Not on file   Tobacco Use    Smoking status: Former Smoker    Smokeless tobacco: Never Used   Substance and Sexual Activity    Alcohol use: Yes     Alcohol/week: 1.0 standard drinks     Types: 1 Glasses of wine per week    Drug use: No     History reviewed. No pertinent family history. Current Outpatient Medications   Medication Sig Dispense Refill    atenolol (TENORMIN) 50 mg tablet TAKE 1 TABLET BY MOUTH TWICE DAILY 180 Tab 3    aspirin delayed-release 81 mg tablet Take  by mouth daily.  ascorbic acid, vitamin C, (VITAMIN C) 250 mg tablet Take 250 mg by mouth daily.  CALCIUM CARBONATE/VITAMIN D3 (CALCIUM+D PO) Take 1 Tab by mouth two (2) times a day.        Allergies   Allergen Reactions    Diclofenac Unknown (comments)     Patient reports she is not allergic       Objective:  Visit Vitals  /80 (BP 1 Location: Left arm, BP Patient Position: Sitting)   Pulse 60   Temp 97.2 °F (36.2 °C) (Oral)   Resp 16   Ht 5' 3\" (1.6 m)   Wt 127 lb (57.6 kg)   SpO2 100%   BMI 22.50 kg/m²     Physical Exam:   General appearance - alert, well appearing, and in no distress  Mental status - alert, oriented to person, place, and time  EYE-TATYANA, EOMI, fundi normal, corneas normal, no foreign bodies  ENT-ENT exam normal, no neck nodes or sinus tenderness  Nose - normal and patent, no erythema, discharge or polyps  Mouth - mucous membranes moist, pharynx normal without lesions  Neck - supple, no significant adenopathy   Chest - clear to auscultation, no wheezes, rales or rhonchi, symmetric air entry   Heart - normal rate, regular rhythm, normal S1, S2, no murmurs, rubs, clicks or gallops   Abdomen - soft, nontender, nondistended, no masses or organomegaly  Lymph- no adenopathy palpable  Ext-peripheral pulses normal, no pedal edema, no clubbing or cyanosis  Skin-Warm and dry. no hyperpigmentation, vitiligo, or suspicious lesions  Neuro -alert, oriented, normal speech, no focal findings or movement disorder noted  Musculoskeletal- FROM, no bony abnormalities, no point tenderness    No results found for this visit on 07/20/20. All results for lab orders may not have been returned by the time this encountered was closed. Assessment/Plan:       ICD-10-CM ICD-9-CM    1. Hypertension, benign  E56 653.6 METABOLIC PANEL, COMPREHENSIVE      URINALYSIS W/O MICRO   2. IGT (impaired glucose tolerance)  R73.02 790.22 HEMOGLOBIN A1C W/O EAG   3. Age-related osteoporosis without current pathological fracture  M81.0 733.01    4. Peripheral arterial disease (HCC)  I73.9 443.9    5. Dyslipidemia  E78.5 272.4 LIPID PANEL       Orders Placed This Encounter    HEMOGLOBIN A1C W/O EAG ([facility])    METABOLIC PANEL, COMPREHENSIVE ([facility])    LIPID PANEL ([facility])    URINALYSIS W/O MICRO ([facility])       Plan:    Problems as outlined above currently stable. We discussed her bone density results and her elevated cholesterol. She has an excellent HDL cholesterol with a moderately elevated LDL cholesterol. Further recommendations based on labs as ordered. She will follow-up with vascular surgery on a six-month basis. I have reviewed with the patient details of the assessment and plan and all questions were answered.  Relevent patient education was performed. Verbal and/or written instructions (see AVS) provided. The most recent lab findings were reviewed with the patient. Plan was discussed with patient who verbal expressed understanding. An After Visit Summary was printed and given to the patient.       Juan David Rodriguez MD

## 2020-09-29 RX ORDER — ATENOLOL 50 MG/1
TABLET ORAL
Qty: 180 TAB | Refills: 3 | Status: SHIPPED | OUTPATIENT
Start: 2020-09-29 | End: 2021-10-12

## 2021-02-02 ENCOUNTER — OFFICE VISIT (OUTPATIENT)
Dept: INTERNAL MEDICINE CLINIC | Age: 76
End: 2021-02-02
Payer: MEDICARE

## 2021-02-02 VITALS
OXYGEN SATURATION: 98 % | HEART RATE: 73 BPM | SYSTOLIC BLOOD PRESSURE: 128 MMHG | TEMPERATURE: 95.7 F | BODY MASS INDEX: 23.21 KG/M2 | WEIGHT: 131 LBS | RESPIRATION RATE: 16 BRPM | DIASTOLIC BLOOD PRESSURE: 78 MMHG | HEIGHT: 63 IN

## 2021-02-02 DIAGNOSIS — E78.5 DYSLIPIDEMIA: ICD-10-CM

## 2021-02-02 DIAGNOSIS — I73.9 PERIPHERAL ARTERIAL DISEASE (HCC): ICD-10-CM

## 2021-02-02 DIAGNOSIS — Z13.1 SCREENING FOR DIABETES MELLITUS: ICD-10-CM

## 2021-02-02 DIAGNOSIS — Z13.31 SCREENING FOR DEPRESSION: ICD-10-CM

## 2021-02-02 DIAGNOSIS — Z00.00 MEDICARE ANNUAL WELLNESS VISIT, SUBSEQUENT: Primary | ICD-10-CM

## 2021-02-02 DIAGNOSIS — Z13.6 SCREENING FOR ISCHEMIC HEART DISEASE: ICD-10-CM

## 2021-02-02 DIAGNOSIS — R73.02 IGT (IMPAIRED GLUCOSE TOLERANCE): ICD-10-CM

## 2021-02-02 DIAGNOSIS — M81.0 AGE-RELATED OSTEOPOROSIS WITHOUT CURRENT PATHOLOGICAL FRACTURE: ICD-10-CM

## 2021-02-02 DIAGNOSIS — I10 HYPERTENSION, BENIGN: ICD-10-CM

## 2021-02-02 LAB
A-G RATIO,AGRAT: 1.8 RATIO
ALBUMIN SERPL-MCNC: 4.8 G/DL (ref 3.9–5.4)
ALP SERPL-CCNC: 84 U/L (ref 38–126)
ALT SERPL-CCNC: 20 U/L (ref 0–35)
ANION GAP SERPL CALC-SCNC: 15 MMOL/L
AST SERPL W P-5'-P-CCNC: 29 U/L (ref 14–36)
BILIRUB SERPL-MCNC: 0.8 MG/DL (ref 0.2–1.3)
BILIRUB UR QL: NEGATIVE
BUN SERPL-MCNC: 22 MG/DL (ref 7–17)
BUN/CREATININE RATIO,BUCR: 24 RATIO
CALCIUM SERPL-MCNC: 10.7 MG/DL (ref 8.4–10.2)
CHLORIDE SERPL-SCNC: 99 MMOL/L (ref 98–107)
CHOL/HDL RATIO,CHHD: 7 RATIO (ref 0–4)
CHOLEST SERPL-MCNC: 292 MG/DL (ref 0–200)
CLARITY: CLEAR
CO2 SERPL-SCNC: 31 MMOL/L (ref 22–32)
COLOR UR: NORMAL
CREAT SERPL-MCNC: 0.9 MG/DL (ref 0.7–1.2)
GLOBULIN,GLOB: 2.6
GLUCOSE 24H UR-MRATE: NEGATIVE G/(24.H)
GLUCOSE SERPL-MCNC: 103 MG/DL (ref 65–105)
HBA1C MFR BLD HPLC: 5.8 % (ref 4–5.7)
HDLC SERPL-MCNC: 40 MG/DL (ref 35–130)
HGB UR QL STRIP: NEGATIVE
KETONES UR QL STRIP.AUTO: NEGATIVE
LDL/HDL RATIO,LDHD: 5 RATIO
LDLC SERPL CALC-MCNC: 209 MG/DL (ref 0–130)
LEUKOCYTE ESTERASE: NEGATIVE
NITRITE UR QL STRIP.AUTO: NEGATIVE
PH UR STRIP: 6 [PH] (ref 5–7)
POTASSIUM SERPL-SCNC: 4.1 MMOL/L (ref 3.6–5)
PROT SERPL-MCNC: 7.4 G/DL (ref 6.3–8.2)
PROT UR STRIP-MCNC: NEGATIVE MG/DL
SODIUM SERPL-SCNC: 145 MMOL/L (ref 137–145)
SP GR UR REFRACTOMETRY: 1.01 (ref 1–1.03)
TRIGL SERPL-MCNC: 216 MG/DL (ref 0–200)
UROBILINOGEN UR QL STRIP.AUTO: NEGATIVE
VLDLC SERPL CALC-MCNC: 43 MG/DL

## 2021-02-02 PROCEDURE — 80053 COMPREHEN METABOLIC PANEL: CPT | Performed by: INTERNAL MEDICINE

## 2021-02-02 PROCEDURE — 1101F PT FALLS ASSESS-DOCD LE1/YR: CPT | Performed by: INTERNAL MEDICINE

## 2021-02-02 PROCEDURE — G8427 DOCREV CUR MEDS BY ELIG CLIN: HCPCS | Performed by: INTERNAL MEDICINE

## 2021-02-02 PROCEDURE — 83036 HEMOGLOBIN GLYCOSYLATED A1C: CPT | Performed by: INTERNAL MEDICINE

## 2021-02-02 PROCEDURE — G0439 PPPS, SUBSEQ VISIT: HCPCS | Performed by: INTERNAL MEDICINE

## 2021-02-02 PROCEDURE — 1090F PRES/ABSN URINE INCON ASSESS: CPT | Performed by: INTERNAL MEDICINE

## 2021-02-02 PROCEDURE — G8420 CALC BMI NORM PARAMETERS: HCPCS | Performed by: INTERNAL MEDICINE

## 2021-02-02 PROCEDURE — 80061 LIPID PANEL: CPT | Performed by: INTERNAL MEDICINE

## 2021-02-02 PROCEDURE — G8752 SYS BP LESS 140: HCPCS | Performed by: INTERNAL MEDICINE

## 2021-02-02 PROCEDURE — G8432 DEP SCR NOT DOC, RNG: HCPCS | Performed by: INTERNAL MEDICINE

## 2021-02-02 PROCEDURE — G8536 NO DOC ELDER MAL SCRN: HCPCS | Performed by: INTERNAL MEDICINE

## 2021-02-02 PROCEDURE — G8754 DIAS BP LESS 90: HCPCS | Performed by: INTERNAL MEDICINE

## 2021-02-02 PROCEDURE — 81003 URINALYSIS AUTO W/O SCOPE: CPT | Performed by: INTERNAL MEDICINE

## 2021-02-02 PROCEDURE — 99214 OFFICE O/P EST MOD 30 MIN: CPT | Performed by: INTERNAL MEDICINE

## 2021-02-02 NOTE — PROGRESS NOTES
This is the Subsequent Medicare Annual Wellness Exam, performed 12 months or more after the Initial AWV or the last Subsequent AWV    I have reviewed the patient's medical history in detail and updated the computerized patient record. Depression Risk Factor Screening:     3 most recent PHQ Screens 1/17/2020   Little interest or pleasure in doing things Not at all   Feeling down, depressed, irritable, or hopeless Not at all   Total Score PHQ 2 0   Trouble falling or staying asleep, or sleeping too much Not at all   Feeling tired or having little energy Not at all   Poor appetite, weight loss, or overeating Not at all   Feeling bad about yourself - or that you are a failure or have let yourself or your family down Not at all   Trouble concentrating on things such as school, work, reading, or watching TV Not at all   Moving or speaking so slowly that other people could have noticed; or the opposite being so fidgety that others notice Not at all   Thoughts of being better off dead, or hurting yourself in some way Not at all   PHQ 9 Score 0       Alcohol Risk Screen    Do you average more than 1 drink per night or more than 7 drinks a week:  No    On any one occasion in the past three months have you have had more than 3 drinks containing alcohol:  No        Functional Ability and Level of Safety:    Hearing: Hearing is good. Activities of Daily Living: The home contains: no safety equipment. Patient does total self care      Ambulation: with no difficulty     Fall Risk:  Fall Risk Assessment, last 12 mths 2/2/2021   Able to walk? Yes   Fall in past 12 months? 0   Do you feel unsteady? 0   Are you worried about falling 0   Number of falls in past 12 months -   Fall with injury?  -      Abuse Screen:  Patient is not abused       Cognitive Screening    Has your family/caregiver stated any concerns about your memory: no     Cognitive Screening: Normal - Verbal Fluency Test    Assessment/Plan   Education and counseling provided:  Are appropriate based on today's review and evaluation    Diagnoses and all orders for this visit:    1. Medicare annual wellness visit, subsequent    2. Hypertension, benign  -     METABOLIC PANEL, COMPREHENSIVE  -     URINALYSIS W/O MICRO    3. IGT (impaired glucose tolerance)  -     HEMOGLOBIN A1C W/O EAG    4. Dyslipidemia  -     LIPID PANEL    5. Age-related osteoporosis without current pathological fracture    6. Peripheral arterial disease ([address]    7. Screening for depression  -     DEPRESSION SCREEN ANNUAL    8. Screening for diabetes mellitus    9.  Screening for ischemic heart disease        Health Maintenance Due     Health Maintenance Due   Topic Date Due    COVID-19 Vaccine (1 of 2) 09/06/1961    GLAUCOMA SCREENING Q2Y  04/11/2020       Patient Care Team   Patient Care Team:  Renae Camilo MD as PCP - General (Dermatology)  Chris Fisher MD as PCP - [address] [place] [address] Dr Bagley Provider    History     Patient Active Problem List   Diagnosis Code    Glaucoma H40.9    Dyslipidemia E78.5    Hypertension, benign I10    Cataract H26.9    Right foot injury S99.921A    IGT (impaired glucose tolerance) R73.02    Age-related osteoporosis without current pathological fracture M81.0    Peripheral arterial disease ([address] I73.9     Past Medical History:   Diagnosis Date    Age-related osteoporosis without current pathological fracture 7/11/2019    Cataract 12/11/2017    Chronic kidney insufficiency 12/11/2017    Depression 12/11/2017    Dyslipidemia 12/11/2017    Fatigue 12/11/2017    GERD (gastroesophageal reflux disease)     Glaucoma 12/11/2017    Gout 12/11/2017    Hypertension     Hypertension, benign 12/11/2017    IGT (impaired glucose tolerance) 1/9/2019    On statin therapy 12/11/2017    Peripheral arterial disease ([address] 1/17/2020    Status post stent right SFA    Pre-ulcerative corn or callous 12/11/2017    Right foot injury 12/11/2017      Past Surgical History: Procedure Laterality Date    COLONOSCOPY N/A 1/6/2017    COLONOSCOPY performed by Augustin Delvalle MD at [facility]"" ENDOSCOPY    HX HEENT      eye surgery    [place] COLON CA SCRN NOT HI RSK IND  1/6/2017          Current Outpatient Medications   Medication Sig Dispense Refill    atenoloL (TENORMIN) 50 mg tablet TAKE 1 TABLET BY MOUTH TWICE DAILY 180 Tab 3    aspirin delayed-release 81 mg tablet Take  by mouth daily.  ascorbic acid, vitamin C, (VITAMIN C) 250 mg tablet Take 250 mg by mouth daily.  CALCIUM CARBONATE/VITAMIN D3 (CALCIUM+D PO) Take 1 Tab by mouth two (2) times a day. Allergies   Allergen Reactions    Diclofenac Unknown (comments)     Patient reports she is not allergic       History reviewed. No pertinent family history. Social History     Tobacco Use    Smoking status: Former Smoker    Smokeless tobacco: Never Used   Substance Use Topics    Alcohol use: Yes     Alcohol/week: 1.0 standard drinks     Types: 1 Glasses of wine per week           Mitchel Wright is a 66 y.o. female and presents with Annual Wellness Visit (6 month f/u)  . Subjective:  Mikayla Zapata presents today for Medicare annual wellness review as well as follow-up of hypertension, hyperlipidemia, peripheral arterial disease, and impaired glucose tolerance. She denies any shortness of breath, chest pain, palpitations, PND, orthopnea, or pedal edema. She does have follow-up with vascular surgery on a yearly basis to monitor her circulation. She had a stent placed in the SFA of the right leg previously. She has no claudication.     Past Medical History:   Diagnosis Date    Age-related osteoporosis without current pathological fracture 7/11/2019    Cataract 12/11/2017    Chronic kidney insufficiency 12/11/2017    Depression 12/11/2017    Dyslipidemia 12/11/2017    Fatigue 12/11/2017    GERD (gastroesophageal reflux disease)     Glaucoma 12/11/2017    Gout 12/11/2017    Hypertension     Hypertension, benign 12/11/2017    IGT (impaired glucose tolerance) 1/9/2019    On statin therapy 12/11/2017    Peripheral arterial disease ([address] 1/17/2020    Status post stent right SFA    Pre-ulcerative corn or callous 12/11/2017    Right foot injury 12/11/2017     Past Surgical History:   Procedure Laterality Date    COLONOSCOPY N/A 1/6/2017    COL[facility] performed [facility]an Harper [facility] at [facility]COPY    HX HEENT      eye surgery    [place] COLON CA SCRN NOT HI RSK IND  1/6/2017          Allergies   Allergen Reactions    Diclofenac Unknown (comments)     Patient reports she is not allergic     Current Outpatient Medications   Medication Sig Dispense Refill    atenoloL (TENORMIN) 50 mg tablet TAKE 1 TABLET BY MOUTH TWICE DAILY 180 Tab 3    aspirin delayed-release 81 mg tablet Take  by mouth daily.  ascorbic acid, vitamin C, (VITAMIN C) 250 mg tablet Take 250 mg by mouth daily.  CALCIUM CARBONATE/VITAMIN D3 (CALCIUM+D PO) Take 1 Tab by mouth two (2) times a day. Social History     Socioeconomic History    Marital status:      Spouse name: Not on file    Number of children: Not on file    Years of education: Not on file    Highest education level: Not on file   Tobacco Use    Smoking status: Former Smoker    Smokeless tobacco: Never Used   Substance and Sexual Activity    Alcohol use: Yes     Alcohol/week: 1.0 standard drinks     Types: 1 Glasses of wine per week    Drug use: No     History reviewed. No pertinent family history. Review of Systems  Constitutional:  negative for fevers, chills, anorexia and weight loss  Eyes:    negative for visual disturbance and irritation  ENT:    negative for tinnitus,sore throat,nasal congestion,ear pains. hoarseness  Respiratory:     negative for cough, hemoptysis, dyspnea,wheezing  CV:    negative for chest pain, palpitations, lower extremity edema  GI:    negative for nausea, vomiting, diarrhea, abdominal pain,melena  Endo: negative for polyuria,polydipsia,polyphagia,heat intolerance  Genitourinary : negative for frequency, dysuria and hematuria  Integumentary: negative for rash and pruritus  Hematologic:   negative for easy bruising and gum/nose bleeding  Musculoskel:  negative for myalgias, arthralgias, back pain, muscle weakness, joint pain  Neurological:   negative for headaches, dizziness, vertigo, memory problems and gait   Behavl/Psych:  negative for feelings of anxiety, depression, mood changes  ROS otherwise negative      Objective:  Visit Vitals  /78   Pulse 73   Temp (!) 95.7 °F (35.4 °C) (Oral)   Resp 16   Ht 5' 3\" (1.6 m)   Wt 131 lb (59.4 kg)   SpO2 98%   BMI 23.21 kg/m²     Physical Exam:   General appearance - alert, well appearing, and in no distress  Mental status - alert, oriented to person, place, and time  EYE-TATYANA, EOMI, fundi normal, corneas normal, no foreign bodies  ENT-ENT exam normal, no neck nodes or sinus tenderness  Nose - normal and patent, no erythema, discharge or polyps  Mouth - mucous membranes moist, pharynx normal without lesions  Neck - supple, no significant adenopathy   Chest - clear to auscultation, no wheezes, rales or rhonchi, symmetric air entry   Heart - normal rate, regular rhythm, normal S1, S2, no murmurs, rubs, clicks or gallops   Abdomen - soft, nontender, nondistended, no masses or organomegaly  Lymph- no adenopathy palpable  Ext-peripheral pulses normal, no pedal edema, no clubbing or cyanosis  Skin-Warm and dry. no hyperpigmentation, vitiligo, or suspicious lesions  Neuro -alert, oriented, normal speech, no focal findings or movement disorder noted      Assessment/Plan:  Diagnoses and all orders for this visit:    1. Medicare annual wellness visit, subsequent    2. Hypertension, benign  -     METABOLIC PANEL, COMPREHENSIVE  -     URINALYSIS W/O MICRO    3. IGT (impaired glucose tolerance)  -     HEMOGLOBIN A1C W/O EAG    4.  Dyslipidemia  -     LIPID PANEL    5. Age-related osteoporosis without current pathological fracture    6. Peripheral arterial disease ([address]    7. Screening for depression  -     DEPRESSION SCREEN ANNUAL    8. Screening for diabetes mellitus    9. Screening for ischemic heart disease          ICD-10-CM ICD-9-CM    1. Medicare annual wellness visit, subsequent  Z00.00 V70.0    2. Hypertension, benign  G76 810.8 METABOLIC PANEL, COMPREHENSIVE      URINALYSIS W/O MICRO   3. IGT (impaired glucose tolerance)  R73.02 790.22 HEMOGLOBIN A1C W/O EAG   4. Dyslipidemia  E78.5 272.4 LIPID PANEL   5. Age-related osteoporosis without current pathological fracture  M81.0 733.01    6. Peripheral arterial disease (HCC)  I73.9 443.9    7. Screening for depression  Z13.31 V79.0 [facility]   8. Screening for diabetes mellitus  Z13.1 V77.1    9. Screening for ischemic heart disease  Z13.6 V81.0        Plan:    Continue current medical regimen as outlined above. Follow-up labs as ordered. I have reviewed with the patient details of the assessment and plan and all questions were answered. Relevent patient education was performed. Verbal and/or written instructions (see AVS) provided. The most recent lab findings were reviewed with the patient. Plan was discussed with patient who verbally expressed understanding. An After Visit Summary was printed and given to the patient.     Stefanie Garcia MD

## 2021-02-02 NOTE — PROGRESS NOTES
HIPAA verified by two patient identifiers. Ling Vo is a 66 y.o. female    Chief Complaint   Patient presents with    Annual Wellness Visit     6 month f/u       Visit Vitals  /78   Pulse 73   Temp (!) 95.7 °F (35.4 °C) (Oral)   Resp 16   Ht 5' 3\" (1.6 m)   Wt 131 lb (59.4 kg)   SpO2 98%   BMI 23.21 kg/m²       Pain Scale: 0 - No pain/10  Pain Location:       Health Maintenance Due   Topic Date Due    COVID-19 Vaccine (1 of 2) 09/06/1961    GLAUCOMA SCREENING Q2Y  04/11/2020         Coordination of Care Questionnaire:  :   1) Have you been to an emergency room, urgent care, or hospitalized since your last visit? If yes, where when, and reason for visit? no       2. Have seen or consulted any other health care provider since your last visit? If yes, where when, and reason for visit? NO      Patient is accompanied by self I have received verbal consent from Ling Vo to discuss any/all medical information while they are present in the room.

## 2021-02-02 NOTE — PATIENT INSTRUCTIONS
Medicare Wellness Visit, Female The best way to live healthy is to have a lifestyle where you eat a well-balanced diet, exercise regularly, limit alcohol use, and quit all forms of tobacco/nicotine, if applicable. Regular preventive services are another way to keep healthy. Preventive services (vaccines, screening tests, monitoring & exams) can help personalize your care plan, which helps you manage your own care. Screening tests can find health problems at the earliest stages, when they are easiest to treat. [facility] follows the current, evidence-based guidelines published by the [facility] ([facility]) when recommending preventive services for our patients. Because we follow these guidelines, sometimes recommendations change over time as research supports it. (For example, mammograms used to be recommended annually. Even though Medicare will still pay for an annual mammogram, the newer guidelines recommend a mammogram every two years for women of average risk). Of course, you and your doctor may decide to screen more often for some diseases, based on your risk and your co-morbidities (chronic disease you are already diagnosed with). Preventive services for you include: - Medicare offers their members a free annual wellness visit, which is time for you and your primary care provider to discuss and plan for your preventive service needs. Take advantage of this benefit every year! 
-All adults over the age of 72 should receive the recommended pneumonia vaccines. Current USPSTF guidelines recommend a series of two vaccines for the best pneumonia protection.  
-All adults should have a flu vaccine yearly and a tetanus vaccine every 10 years.  
-All adults age 48 and older should receive the shingles vaccines (series of two vaccines). -All adults age 38-68 who are overweight should have a diabetes screening test once every three years. -All adults born between [birth year] and 1967 should be screened once for Hepatitis C. 
-Other screening tests and preventive services for persons with diabetes include: an eye exam to screen for diabetic retinopathy, a kidney function test, a foot exam, and stricter control over your cholesterol.  
-Cardiovascular screening for adults with routine risk involves an electrocardiogram (ECG) at intervals determined by your doctor.  
-Colorectal cancer screenings should be done for adults age 54-65 with no increased risk factors for colorectal cancer. There are a number of acceptable methods of screening for this type of cancer. Each test has its own benefits and drawbacks. Discuss with your doctor what is most appropriate for you during your annual wellness visit. The different tests include: colonoscopy (considered the best screening method), a fecal occult blood test, a fecal DNA test, and sigmoidoscopy. 
 
-A bone mass density test is recommended when a woman turns 65 to screen for osteoporosis. This test is only recommended one time, as a screening. Some providers will use this same test as a disease monitoring tool if you already have osteoporosis. -Breast cancer screenings are recommended every other year for women of normal risk, age 54-69. 
-Cervical cancer screenings for women over age 72 are only recommended with certain risk factors. Here is a list of your current Health Maintenance items (your personalized list of preventive services) with a due date: 
Health Maintenance Due Topic Date Due  
 COVID-19 Vaccine (1 of 2) 09/06/1961  Glaucoma Screening   04/11/2020

## 2021-08-24 ENCOUNTER — OFFICE VISIT (OUTPATIENT)
Dept: INTERNAL MEDICINE CLINIC | Age: 76
End: 2021-08-24
Payer: MEDICARE

## 2021-08-24 VITALS
RESPIRATION RATE: 17 BRPM | TEMPERATURE: 98.2 F | WEIGHT: 130.2 LBS | BODY MASS INDEX: 23.07 KG/M2 | HEART RATE: 62 BPM | HEIGHT: 63 IN | SYSTOLIC BLOOD PRESSURE: 138 MMHG | OXYGEN SATURATION: 98 % | DIASTOLIC BLOOD PRESSURE: 76 MMHG

## 2021-08-24 DIAGNOSIS — M81.0 AGE-RELATED OSTEOPOROSIS WITHOUT CURRENT PATHOLOGICAL FRACTURE: ICD-10-CM

## 2021-08-24 DIAGNOSIS — E78.5 DYSLIPIDEMIA: ICD-10-CM

## 2021-08-24 DIAGNOSIS — I10 HYPERTENSION, BENIGN: Primary | ICD-10-CM

## 2021-08-24 DIAGNOSIS — Z11.59 NEED FOR HEPATITIS C SCREENING TEST: ICD-10-CM

## 2021-08-24 DIAGNOSIS — R73.02 IGT (IMPAIRED GLUCOSE TOLERANCE): ICD-10-CM

## 2021-08-24 LAB
APPEARANCE UR: CLEAR
BACTERIA URNS QL MICRO: NEGATIVE /HPF
BILIRUB UR QL: NEGATIVE
COLOR UR: ABNORMAL
COMMENT, HOLDF: NORMAL
EPITH CASTS URNS QL MICRO: ABNORMAL /LPF
GLUCOSE UR STRIP.AUTO-MCNC: NEGATIVE MG/DL
HGB UR QL STRIP: NEGATIVE
HYALINE CASTS URNS QL MICRO: ABNORMAL /LPF (ref 0–5)
KETONES UR QL STRIP.AUTO: NEGATIVE MG/DL
LEUKOCYTE ESTERASE UR QL STRIP.AUTO: ABNORMAL
NITRITE UR QL STRIP.AUTO: NEGATIVE
PH UR STRIP: 6 [PH] (ref 5–8)
PROT UR STRIP-MCNC: NEGATIVE MG/DL
RBC #/AREA URNS HPF: ABNORMAL /HPF (ref 0–5)
SAMPLES BEING HELD,HOLD: NORMAL
SP GR UR REFRACTOMETRY: 1.02 (ref 1–1.03)
UA: UC IF INDICATED,UAUC: ABNORMAL
UROBILINOGEN UR QL STRIP.AUTO: 0.2 EU/DL (ref 0.2–1)
WBC URNS QL MICRO: ABNORMAL /HPF (ref 0–4)

## 2021-08-24 PROCEDURE — G8752 SYS BP LESS 140: HCPCS | Performed by: INTERNAL MEDICINE

## 2021-08-24 PROCEDURE — G8536 NO DOC ELDER MAL SCRN: HCPCS | Performed by: INTERNAL MEDICINE

## 2021-08-24 PROCEDURE — G8754 DIAS BP LESS 90: HCPCS | Performed by: INTERNAL MEDICINE

## 2021-08-24 PROCEDURE — G8420 CALC BMI NORM PARAMETERS: HCPCS | Performed by: INTERNAL MEDICINE

## 2021-08-24 PROCEDURE — 99214 OFFICE O/P EST MOD 30 MIN: CPT | Performed by: INTERNAL MEDICINE

## 2021-08-24 PROCEDURE — G8427 DOCREV CUR MEDS BY ELIG CLIN: HCPCS | Performed by: INTERNAL MEDICINE

## 2021-08-24 PROCEDURE — G8432 DEP SCR NOT DOC, RNG: HCPCS | Performed by: INTERNAL MEDICINE

## 2021-08-24 PROCEDURE — 1101F PT FALLS ASSESS-DOCD LE1/YR: CPT | Performed by: INTERNAL MEDICINE

## 2021-08-24 PROCEDURE — 1090F PRES/ABSN URINE INCON ASSESS: CPT | Performed by: INTERNAL MEDICINE

## 2021-08-24 NOTE — PROGRESS NOTES
Chief Complaint   Patient presents with    Hypertension     6 month follow up     Visit Vitals  /76 (BP 1 Location: Left upper arm, BP Patient Position: Sitting, BP Cuff Size: Adult)   Pulse 62   Temp 98.2 °F (36.8 °C) (Temporal)   Resp 17   Ht 5' 3\" (1.6 m)   Wt 130 lb 3.2 oz (59.1 kg)   SpO2 98%   BMI 23.06 kg/m²     1. Have you been to the ER, urgent care clinic since your last visit? Hospitalized since your last visit? No    2. Have you seen or consulted any other health care providers outside of the [address] [place] [address] since your last visit? Include any pap smears or colon screening.  Dr. Bruno Fearing  Also had mammogram 8/2021

## 2021-08-24 NOTE — PROGRESS NOTES
This note will not be viewable in [address] Regine Quach is a 66 y.o. female and presents with Hypertension (6 month follow up)  . Subjective:    Mrs. Carleton Goldberg presents today for follow-up of hypertension, hyperlipidemia, impaired glucose tolerance, peripheral arterial disease, and mild renal insufficiency. She has a remote history of melanoma. She has had follow-up with dermatology and has had a couple of excisions. She had Mohs surgery on the lesion on her right fifth digit which was benign. She is doing well on her current medical regimen. She denies any side effects from medicine. She remains on atenolol 50 mg daily for hypertension with good results. She just received her flu vaccine and has completed her Covid vaccine.   Past Medical History:   Diagnosis Date    Age-related osteoporosis without current pathological fracture 7/11/2019    Cataract 12/11/2017    Chronic kidney insufficiency 12/11/2017    Depression 12/11/2017    Dyslipidemia 12/11/2017    Fatigue 12/11/2017    GERD (gastroesophageal reflux disease)     Glaucoma 12/11/2017    Gout 12/11/2017    Hypertension     Hypertension, benign 12/11/2017    IGT (impaired glucose tolerance) 1/9/2019    On statin therapy 12/11/2017    Peripheral arterial disease ([address] 1/17/2020    Status post stent right SFA    Pre-ulcerative corn or callous 12/11/2017    Right foot injury 12/11/2017     Past Surgical History:   Procedure Laterality Date    COLONOSCOPY N/A 1/6/2017    COL[facility] performed by Andrew [facility] at [facility] END[facility]COPY    HX HEENT      eye surgery    ND COLON CA SCRN NOT HI RSK IND  1/6/2017          Allergies   Allergen Reactions    Diclofenac Unknown (comments)     Patient reports she is not allergic     Current Outpatient Medications   Medication Sig Dispense Refill    atenoloL (TENORMIN) 50 mg tablet TAKE 1 TABLET BY MOUTH TWICE DAILY 180 Tab 3    aspirin delayed-release 81 mg tablet Take  by mouth daily.      ascorbic acid, vitamin C, (VITAMIN C) 250 mg tablet Take 250 mg by mouth daily.  CALCIUM CARBONATE/VITAMIN D3 (CALCIUM+D PO) Take 1 Tab by mouth two (2) times a day. Social History     Socioeconomic History    Marital status:      Spouse name: Not on file    Number of children: Not on file    Years of education: Not on file    Highest education level: Not on file   Tobacco Use    Smoking status: Former Smoker    Smokeless tobacco: Never Used   Vaping Use    Vaping Use: Never used   Substance and Sexual Activity    Alcohol use: Yes     Alcohol/week: 1.0 standard drinks     Types: 1 Glasses of wine per week    Drug use: No     Social Determinants of Health     Financial Resource Strain:     Difficulty of Paying Living Expenses:    Food Insecurity:     Worried About Running Out of Food in the Last Year:     [address] in the Last Year:    Transportation Needs:     Lack of Transportation (Medical):  Lack of Transportation (Non-Medical):    Physical Activity:     Days of Exercise per Week:     Minutes of Exercise per Session:    Stress:     Feeling of Stress :    Social Connections:     Frequency of Communication with Friends and Family:     Frequency of Social Gatherings with Friends and Family:     Attends [employer] Services:     Active Member of Clubs or Organizations:     Attends Club or Organization Meetings:     Marital Status:      History reviewed. No pertinent family history. Review of Systems  Constitutional:  negative for fevers, chills, anorexia and weight loss  Eyes:    negative for visual disturbance and irritation  ENT:    negative for tinnitus,sore throat,nasal congestion,ear pains. hoarseness  Respiratory:     negative for cough, hemoptysis, dyspnea,wheezing  CV:    negative for chest pain, palpitations, lower extremity edema  GI:    negative for nausea, vomiting, diarrhea, abdominal pain,melena  Endo:               negative for polyuria,polydipsia,polyphagia,heat intolerance  Genitourinary : negative for frequency, dysuria and hematuria  Integumentary: negative for rash and pruritus  Hematologic:   negative for easy bruising and gum/nose bleeding  Musculoskel:  negative for myalgias, arthralgias, back pain, muscle weakness, joint pain  Neurological:   negative for headaches, dizziness, vertigo, memory problems and gait   Behavl/Psych:  negative for feelings of anxiety, depression, mood changes  ROS otherwise negative      Objective:  Visit Vitals  /76 (BP 1 Location: Left upper arm, BP Patient Position: Sitting, BP Cuff Size: Adult)   Pulse 62   Temp 98.2 °F (36.8 °C) (Temporal)   Resp 17   Ht 5' 3\" (1.6 m)   Wt 130 lb 3.2 oz (59.1 kg)   SpO2 98%   BMI 23.06 kg/m²     Physical Exam:   General appearance - alert, well appearing, and in no distress  Mental status - alert, oriented to person, place, and time  EYE-TATYANA, EOMI, fundi normal, corneas normal, no foreign bodies  ENT-ENT exam normal, no neck nodes or sinus tenderness  Nose - normal and patent, no erythema, discharge or polyps  Mouth - mucous membranes moist, pharynx normal without lesions  Neck - supple, no significant adenopathy   Chest - clear to auscultation, no wheezes, rales or rhonchi, symmetric air entry   Heart - normal rate, regular rhythm, normal S1, S2, no murmurs, rubs, clicks or gallops   Abdomen - soft, nontender, nondistended, no masses or organomegaly  Lymph- no adenopathy palpable  Ext-peripheral pulses normal, no pedal edema, no clubbing or cyanosis  Skin-Warm and dry. no hyperpigmentation, vitiligo, or suspicious lesions  Neuro -alert, oriented, normal speech, no focal findings or movement disorder noted      Assessment/Plan:  Diagnoses and all orders for this visit:    1. Hypertension, benign  -     METABOLIC PANEL, COMPREHENSIVE; Future  -     URINALYSIS W/ REFLEX CULTURE; Future    2.  IGT (impaired glucose tolerance)  -     HEMOGLOBIN A1C WITH EAG; Future    3. Dyslipidemia  -     LIPID PANEL; Future    4. Age-related osteoporosis without current pathological fracture    5. Need for hepatitis C screening test  -     HEPATITIS C AB; Future          ICD-10-CM ICD-9-CM    1. Hypertension, benign  S01 938.9 METABOLIC PANEL, COMPREHENSIVE      URINALYSIS W/ REFLEX CULTURE   2. IGT (impaired glucose tolerance)  R73.02 790.22 HEMOGLOBIN A1C WITH EAG   3. Dyslipidemia  E78.5 272.4 LIPID PANEL   4. Age-related osteoporosis without current pathological fracture  M81.0 733.01    5. Need for hepatitis C screening test  Z11.59 V73.89 HEPATITIS C AB       Plan:    Continue current medical regimen as outlined above. Further recommendations based on labs as ordered. The patient's cholesterol has been elevated but she has been intolerant of statins in the past.      I have reviewed with the patient details of the assessment and plan and all questions were answered. Relevent patient education was performed. Verbal and/or written instructions (see AVS) provided. The most recent lab findings were reviewed with the patient. Plan was discussed with patient who verbally expressed understanding. An After Visit Summary was printed and given to the patient.     Phylicia Soria MD

## 2021-08-25 LAB
ALBUMIN SERPL-MCNC: 4.2 G/DL (ref 3.5–5)
ALBUMIN/GLOB SERPL: 1.3 {RATIO} (ref 1.1–2.2)
ALP SERPL-CCNC: 79 U/L (ref 45–117)
ALT SERPL-CCNC: 30 U/L (ref 12–78)
ANION GAP SERPL CALC-SCNC: 6 MMOL/L (ref 5–15)
AST SERPL-CCNC: 18 U/L (ref 15–37)
BILIRUB SERPL-MCNC: 0.4 MG/DL (ref 0.2–1)
BUN SERPL-MCNC: 22 MG/DL (ref 6–20)
BUN/CREAT SERPL: 25 (ref 12–20)
CALCIUM SERPL-MCNC: 9.4 MG/DL (ref 8.5–10.1)
CHLORIDE SERPL-SCNC: 108 MMOL/L (ref 97–108)
CHOLEST SERPL-MCNC: 282 MG/DL
CO2 SERPL-SCNC: 26 MMOL/L (ref 21–32)
CREAT SERPL-MCNC: 0.88 MG/DL (ref 0.55–1.02)
EST. AVERAGE GLUCOSE BLD GHB EST-MCNC: 120 MG/DL
GLOBULIN SER CALC-MCNC: 3.2 G/DL (ref 2–4)
GLUCOSE SERPL-MCNC: 99 MG/DL (ref 65–100)
HBA1C MFR BLD: 5.8 % (ref 4–5.6)
HCV AB SERPL QL IA: NONREACTIVE
HDLC SERPL-MCNC: 44 MG/DL
HDLC SERPL: 6.4 {RATIO} (ref 0–5)
LDLC SERPL CALC-MCNC: 191.2 MG/DL (ref 0–100)
POTASSIUM SERPL-SCNC: 4 MMOL/L (ref 3.5–5.1)
PROT SERPL-MCNC: 7.4 G/DL (ref 6.4–8.2)
SODIUM SERPL-SCNC: 140 MMOL/L (ref 136–145)
TRIGL SERPL-MCNC: 234 MG/DL (ref ?–150)
VLDLC SERPL CALC-MCNC: 46.8 MG/DL

## 2021-10-12 RX ORDER — ATENOLOL 50 MG/1
TABLET ORAL
Qty: 180 TABLET | Refills: 3 | Status: SHIPPED | OUTPATIENT
Start: 2021-10-12 | End: 2022-09-22

## 2022-02-22 ENCOUNTER — TELEPHONE (OUTPATIENT)
Dept: INTERNAL MEDICINE CLINIC | Age: 77
End: 2022-02-22

## 2022-02-22 NOTE — TELEPHONE ENCOUNTER
Sent to linda to confirm ----- Message from Edu Goel sent at 2/22/2022 11:41 AM EST -----  Subject: Message to Provider    QUESTIONS  Information for Provider? patient is calling in to confirm her appointment   on 02/25/2022 at 10:30. She received a call stating she needed to call   back to confirm  ---------------------------------------------------------------------------  --------------  [address]  What is the best way for the office to contact you? OK to leave message on   voicemail  Preferred Call Back Phone Number? [phone]  ---------------------------------------------------------------------------  --------------  SCRIPT ANSWERS  Relationship to Patient?  Self

## 2022-02-25 ENCOUNTER — OFFICE VISIT (OUTPATIENT)
Dept: INTERNAL MEDICINE CLINIC | Age: 77
End: 2022-02-25
Payer: MEDICARE

## 2022-02-25 VITALS
WEIGHT: 127.2 LBS | BODY MASS INDEX: 22.54 KG/M2 | HEIGHT: 63 IN | OXYGEN SATURATION: 96 % | HEART RATE: 74 BPM | RESPIRATION RATE: 18 BRPM | DIASTOLIC BLOOD PRESSURE: 78 MMHG | SYSTOLIC BLOOD PRESSURE: 134 MMHG

## 2022-02-25 DIAGNOSIS — Z13.1 SCREENING FOR DIABETES MELLITUS: ICD-10-CM

## 2022-02-25 DIAGNOSIS — I73.9 PERIPHERAL ARTERIAL DISEASE (HCC): ICD-10-CM

## 2022-02-25 DIAGNOSIS — R53.83 FATIGUE, UNSPECIFIED TYPE: ICD-10-CM

## 2022-02-25 DIAGNOSIS — M81.0 AGE-RELATED OSTEOPOROSIS WITHOUT CURRENT PATHOLOGICAL FRACTURE: ICD-10-CM

## 2022-02-25 DIAGNOSIS — I10 HYPERTENSION, BENIGN: ICD-10-CM

## 2022-02-25 DIAGNOSIS — E78.5 DYSLIPIDEMIA: ICD-10-CM

## 2022-02-25 DIAGNOSIS — Z13.6 SCREENING FOR ISCHEMIC HEART DISEASE: ICD-10-CM

## 2022-02-25 DIAGNOSIS — R73.02 IGT (IMPAIRED GLUCOSE TOLERANCE): ICD-10-CM

## 2022-02-25 DIAGNOSIS — Z78.9 STATIN INTOLERANCE: ICD-10-CM

## 2022-02-25 DIAGNOSIS — Z00.00 MEDICARE ANNUAL WELLNESS VISIT, SUBSEQUENT: Primary | ICD-10-CM

## 2022-02-25 DIAGNOSIS — Z13.31 SCREENING FOR DEPRESSION: ICD-10-CM

## 2022-02-25 LAB
ALBUMIN SERPL-MCNC: 4.3 G/DL (ref 3.5–5)
ALBUMIN/GLOB SERPL: 1.3 {RATIO} (ref 1.1–2.2)
ALP SERPL-CCNC: 83 U/L (ref 45–117)
ALT SERPL-CCNC: 27 U/L (ref 12–78)
ANION GAP SERPL CALC-SCNC: 6 MMOL/L (ref 5–15)
APPEARANCE UR: CLEAR
AST SERPL-CCNC: 18 U/L (ref 15–37)
BACTERIA URNS QL MICRO: NEGATIVE /HPF
BASOPHILS # BLD: 0.1 K/UL (ref 0–0.1)
BASOPHILS NFR BLD: 1 % (ref 0–1)
BILIRUB SERPL-MCNC: 0.6 MG/DL (ref 0.2–1)
BILIRUB UR QL: NEGATIVE
BUN SERPL-MCNC: 28 MG/DL (ref 6–20)
BUN/CREAT SERPL: 27 (ref 12–20)
CALCIUM SERPL-MCNC: 10.3 MG/DL (ref 8.5–10.1)
CHLORIDE SERPL-SCNC: 103 MMOL/L (ref 97–108)
CHOLEST SERPL-MCNC: 276 MG/DL
CO2 SERPL-SCNC: 29 MMOL/L (ref 21–32)
COLOR UR: ABNORMAL
CREAT SERPL-MCNC: 1.03 MG/DL (ref 0.55–1.02)
DIFFERENTIAL METHOD BLD: ABNORMAL
EOSINOPHIL # BLD: 0.3 K/UL (ref 0–0.4)
EOSINOPHIL NFR BLD: 3 % (ref 0–7)
EPITH CASTS URNS QL MICRO: ABNORMAL /LPF
ERYTHROCYTE [DISTWIDTH] IN BLOOD BY AUTOMATED COUNT: 13 % (ref 11.5–14.5)
EST. AVERAGE GLUCOSE BLD GHB EST-MCNC: 117 MG/DL
GLOBULIN SER CALC-MCNC: 3.2 G/DL (ref 2–4)
GLUCOSE SERPL-MCNC: 103 MG/DL (ref 65–100)
GLUCOSE UR STRIP.AUTO-MCNC: NEGATIVE MG/DL
HBA1C MFR BLD: 5.7 % (ref 4–5.6)
HCT VFR BLD AUTO: 51.8 % (ref 35–47)
HDLC SERPL-MCNC: 41 MG/DL
HDLC SERPL: 6.7 {RATIO} (ref 0–5)
HGB BLD-MCNC: 16 G/DL (ref 11.5–16)
HGB UR QL STRIP: NEGATIVE
HYALINE CASTS URNS QL MICRO: ABNORMAL /LPF (ref 0–5)
IMM GRANULOCYTES # BLD AUTO: 0 K/UL (ref 0–0.04)
IMM GRANULOCYTES NFR BLD AUTO: 0 % (ref 0–0.5)
KETONES UR QL STRIP.AUTO: NEGATIVE MG/DL
LDLC SERPL CALC-MCNC: 187.8 MG/DL (ref 0–100)
LEUKOCYTE ESTERASE UR QL STRIP.AUTO: ABNORMAL
LYMPHOCYTES # BLD: 4.1 K/UL (ref 0.8–3.5)
LYMPHOCYTES NFR BLD: 40 % (ref 12–49)
MCH RBC QN AUTO: 26.3 PG (ref 26–34)
MCHC RBC AUTO-ENTMCNC: 30.9 G/DL (ref 30–36.5)
MCV RBC AUTO: 85.1 FL (ref 80–99)
MONOCYTES # BLD: 0.8 K/UL (ref 0–1)
MONOCYTES NFR BLD: 8 % (ref 5–13)
NEUTS SEG # BLD: 5.1 K/UL (ref 1.8–8)
NEUTS SEG NFR BLD: 48 % (ref 32–75)
NITRITE UR QL STRIP.AUTO: NEGATIVE
NRBC # BLD: 0 K/UL (ref 0–0.01)
NRBC BLD-RTO: 0 PER 100 WBC
PH UR STRIP: 7 [PH] (ref 5–8)
PLATELET # BLD AUTO: 254 K/UL (ref 150–400)
PMV BLD AUTO: 11.1 FL (ref 8.9–12.9)
POTASSIUM SERPL-SCNC: 4 MMOL/L (ref 3.5–5.1)
PROT SERPL-MCNC: 7.5 G/DL (ref 6.4–8.2)
PROT UR STRIP-MCNC: NEGATIVE MG/DL
RBC # BLD AUTO: 6.09 M/UL (ref 3.8–5.2)
RBC #/AREA URNS HPF: ABNORMAL /HPF (ref 0–5)
SODIUM SERPL-SCNC: 138 MMOL/L (ref 136–145)
SP GR UR REFRACTOMETRY: 1.01 (ref 1–1.03)
TRIGL SERPL-MCNC: 236 MG/DL (ref ?–150)
UR CULT HOLD, URHOLD: NORMAL
UROBILINOGEN UR QL STRIP.AUTO: 0.2 EU/DL (ref 0.2–1)
VLDLC SERPL CALC-MCNC: 47.2 MG/DL
WBC # BLD AUTO: 10.4 K/UL (ref 3.6–11)
WBC URNS QL MICRO: ABNORMAL /HPF (ref 0–4)

## 2022-02-25 PROCEDURE — 1101F PT FALLS ASSESS-DOCD LE1/YR: CPT | Performed by: INTERNAL MEDICINE

## 2022-02-25 PROCEDURE — G8510 SCR DEP NEG, NO PLAN REQD: HCPCS | Performed by: INTERNAL MEDICINE

## 2022-02-25 PROCEDURE — G8420 CALC BMI NORM PARAMETERS: HCPCS | Performed by: INTERNAL MEDICINE

## 2022-02-25 PROCEDURE — G0439 PPPS, SUBSEQ VISIT: HCPCS | Performed by: INTERNAL MEDICINE

## 2022-02-25 PROCEDURE — G8754 DIAS BP LESS 90: HCPCS | Performed by: INTERNAL MEDICINE

## 2022-02-25 PROCEDURE — G8427 DOCREV CUR MEDS BY ELIG CLIN: HCPCS | Performed by: INTERNAL MEDICINE

## 2022-02-25 PROCEDURE — G8536 NO DOC ELDER MAL SCRN: HCPCS | Performed by: INTERNAL MEDICINE

## 2022-02-25 PROCEDURE — G8752 SYS BP LESS 140: HCPCS | Performed by: INTERNAL MEDICINE

## 2022-02-25 RX ORDER — CLOPIDOGREL BISULFATE 75 MG/1
75 TABLET ORAL DAILY
COMMUNITY
Start: 2022-01-24 | End: 2022-06-26 | Stop reason: ALTCHOICE

## 2022-02-25 NOTE — PROGRESS NOTES
Daniel Estrada is a 64 y.o. female and presents with Annual Exam  .    Subjective:  Mrs. Mariah Granados presents today for Medicare annual wellness review as well as follow-up of chronic renal insufficiency, dyslipidemia (intolerant of statin therapy), hypertension, impaired glucose tolerance, peripheral arterial disease. She has follow-up with vascular surgery on a yearly basis. She is status post stent of the right and left femoral artery. She remains on atenolol 50 mg daily, aspirin 81 mg daily, and clopidogrel 75 mg daily. She has no shortness of breath, chest pain, palpitations, PND, orthopnea, or pedal edema. She is being followed by dermatology for history of melanoma and apparently has had some squamous cell cancers removed as well. Past Medical History:   Diagnosis Date    Age-related osteoporosis without current pathological fracture 7/11/2019    Cataract 12/11/2017    Chronic kidney insufficiency 12/11/2017    Depression 12/11/2017    Dyslipidemia 12/11/2017    Fatigue 12/11/2017    GERD (gastroesophageal reflux disease)     Glaucoma 12/11/2017    Gout 12/11/2017    Hypertension     Hypertension, benign 12/11/2017    IGT (impaired glucose tolerance) 1/9/2019    On statin therapy 12/11/2017    Peripheral arterial disease ([address] 1/17/2020    Status post stent right SFA    Pre-ulcerative corn or callous 12/11/2017    Right foot injury 12/11/2017    Statin intolerance 2/25/2022     Past Surgical History:   Procedure Laterality Date    COLONOSCOPY N/A 1/6/2017    COL[facility] performed [facility] Ruel [facility]ara [facility] at [facility] ENDOSCOPY    HX HEENT      eye surgery    ME COLON CA SCRN NOT HI RSK IND  1/6/2017          Allergies   Allergen Reactions    Diclofenac Unknown (comments)     Patient reports she is not allergic     Current Outpatient Medications   Medication Sig Dispense Refill    clopidogreL (PLAVIX) 75 mg tab Take 75 mg by mouth daily.       atenoloL (TENORMIN) 50 mg tablet TAKE 1 TABLET BY MOUTH TWICE DAILY 180 Tablet 3    aspirin delayed-release 81 mg tablet Take  by mouth daily.  ascorbic acid, vitamin C, (VITAMIN C) 250 mg tablet Take 250 mg by mouth daily.  CALCIUM CARBONATE/VITAMIN D3 (CALCIUM+D PO) Take 1 Tab by mouth two (2) times a day. Social History     Socioeconomic History    Marital status:    Tobacco Use    Smoking status: Former Smoker    Smokeless tobacco: Never Used   Vaping Use    Vaping Use: Never used   Substance and Sexual Activity    Alcohol use: Yes     Alcohol/week: 1.0 standard drink     Types: 1 Glasses of wine per week    Drug use: No     History reviewed. No pertinent family history. Review of Systems  Constitutional:  negative for fevers, chills, anorexia and weight loss  Eyes:    negative for visual disturbance and irritation  ENT:    negative for tinnitus,sore throat,nasal congestion,ear pains. hoarseness  Respiratory:     negative for cough, hemoptysis, dyspnea,wheezing  CV:    negative for chest pain, palpitations, lower extremity edema  GI:    negative for nausea, vomiting, diarrhea, abdominal pain,melena  Endo:               negative for polyuria,polydipsia,polyphagia,heat intolerance  Genitourinary : negative for frequency, dysuria and hematuria  Integumentary: negative for rash and pruritus  Hematologic:   negative for easy bruising and gum/nose bleeding  Musculoskel:  negative for myalgias, arthralgias, back pain, muscle weakness, joint pain  Neurological:   negative for headaches, dizziness, vertigo, memory problems and gait   Behavl/Psych:  negative for feelings of anxiety, depression, mood changes  ROS otherwise negative      Objective:  Visit Vitals  /78 (BP 1 Location: Left arm, BP Patient Position: Sitting, BP Cuff Size: Large adult)   Pulse 74   Resp 18   Ht 5' 3\" (1.6 m)   Wt 127 lb 3.2 oz (57.7 kg)   SpO2 96%   BMI 22.53 kg/m²     Physical Exam:   General appearance - alert, well appearing, and in no distress  Mental status - alert, oriented to person, place, and time  EYE-TATYANA, EOMI, fundi normal, corneas normal, no foreign bodies  ENT-ENT exam normal, no neck nodes or sinus tenderness  Nose - normal and patent, no erythema, discharge or polyps  Mouth - mucous membranes moist, pharynx normal without lesions  Neck - supple, no significant adenopathy   Chest - clear to auscultation, no wheezes, rales or rhonchi, symmetric air entry   Heart - normal rate, regular rhythm, normal S1, S2, no murmurs, rubs, clicks or gallops   Abdomen - soft, nontender, nondistended, no masses or organomegaly  Lymph- no adenopathy palpable  Ext-peripheral pulses normal, no pedal edema, no clubbing or cyanosis  Skin-Warm and dry. no hyperpigmentation, vitiligo, or suspicious lesions  Neuro -alert, oriented, normal speech, no focal findings or movement disorder noted      Assessment/Plan:  Diagnoses and all orders for this visit:    1. Medicare annual wellness visit, subsequent    2. Hypertension, benign  -     LIPID PANEL; Future  -     METABOLIC PANEL, COMPREHENSIVE; Future  -     URINALYSIS W/ RFLX MICROSCOPIC; Future    3. Peripheral arterial disease ([facility] [address] [facility]    4. IGT (impaired glucose tolerance)  -     METABOLIC PANEL, COMPREHENSIVE; Future  -     HEMOGLOBIN A1C WITH EAG; Future    5. Age-related osteoporosis without current pathological fracture    6. Dyslipidemia    7. Fatigue, unspecified type  -     CBC WITH AUTOMATED DIFF; Future    8. Statin intolerance    9. Screening for diabetes mellitus    10. Screening for ischemic heart disease    11. Screening for depression  -     [facility]          ICD-10-CM ICD-9-CM    1. Medicare annual wellness visit, subsequent  Z00.00 V70.0    2. Hypertension, benign  I10 401.1 LIPID PANEL      METABOLIC PANEL, COMPREHENSIVE      URINALYSIS W/ RFLX MICROSCOPIC   3. Peripheral arterial disease (HCC)  I73.9 443.9    4.  IGT (impaired glucose tolerance)  R73.02 790.22 METABOLIC PANEL, COMPREHENSIVE      HEMOGLOBIN A1C WITH EAG   5. Age-related osteoporosis without current pathological fracture  M81.0 733.01    6. Dyslipidemia  E78.5 272.4    7. Fatigue, unspecified type  R53.83 780.79 CBC WITH AUTOMATED DIFF   8. Statin intolerance  Z78.9 995.27    9. Screening for diabetes mellitus  Z13.1 V77.1    10. Screening for ischemic heart disease  Z13.6 V81.0    11. Screening for depression  Z13.31 V79.0 DEPRESSION SCREEN ANNUAL     Plan:    Continue current medical regimen as outlined above. Further recommendations based on labs as ordered. I have reviewed with the patient details of the assessment and plan and all questions were answered. Relevent patient education was performed. Verbal and/or written instructions (see AVS) provided. The most recent lab findings were reviewed with the patient. Plan was discussed with patient who verbally expressed understanding. An After Visit Summary was printed and given to the patient. Wes Mcgee MD        This is the Subsequent Medicare Annual Wellness Exam, performed 12 months or more after the Initial AWV or the last Subsequent AWV    I have reviewed the patient's medical history in detail and updated the computerized patient record. Assessment/Plan   Education and counseling provided:  Are appropriate based on today's review and evaluation    1. Medicare annual wellness visit, subsequent  2. Hypertension, benign  -     LIPID PANEL; Future  -     METABOLIC PANEL, COMPREHENSIVE; Future  -     URINALYSIS W/ RFLX MICROSCOPIC; Future  3. Peripheral arterial disease ([address]  4. IGT (impaired glucose tolerance)  -     METABOLIC PANEL, COMPREHENSIVE; Future  -     HEMOGLOBIN A1C WITH EAG; Future  5. Age-related osteoporosis without current pathological fracture  6. Dyslipidemia  7. Fatigue, unspecified type  -     CBC WITH AUTOMATED DIFF; Future  8. Statin intolerance  9. Screening for diabetes mellitus  10.  Screening for ischemic heart disease  11. Screening for depression  -     DEPRESSION SCREEN ANNUAL       Depression Risk Factor Screening     3 most recent PHQ Screens 2/25/2022   Little interest or pleasure in doing things Not at all   Feeling down, depressed, irritable, or hopeless Not at all   Total Score PHQ 2 0   Trouble falling or staying asleep, or sleeping too much -   Feeling tired or having little energy -   Poor appetite, weight loss, or overeating -   Feeling bad about yourself - or that you are a failure or have let yourself or your family down -   Trouble concentrating on things such as school, work, reading, or watching TV -   Moving or speaking so slowly that other people could have noticed; or the opposite being so fidgety that others notice -   Thoughts of being better off dead, or hurting yourself in some way -   PHQ 9 Score -       Alcohol & Drug Abuse Risk Screen    Do you average more than 1 drink per night or more than 7 drinks a week:  No    On any one occasion in the past three months have you have had more than 3 drinks containing alcohol:  No          Functional Ability and Level of Safety    Hearing: Hearing is good. Activities of Daily Living: The home contains: no safety equipment. Patient does total self care      Ambulation: with no difficulty     Fall Risk:  Fall Risk Assessment, last 12 mths 2/2/2021   Able to walk? Yes   Fall in past 12 months? 0   Do you feel unsteady? 0   Are you worried about falling 0   Number of falls in past 12 months -   Fall with injury?  -      Abuse Screen:  Patient is not abused       Cognitive Screening    Has your family/caregiver stated any concerns about your memory: no     Cognitive Screening: Normal - Verbal Fluency Test    Health Maintenance Due     Health Maintenance Due   Topic Date Due    Depression Screen  Never done    COVID-19 Vaccine (3 - Booster for Claudeen Fermo series) 08/28/2021       Patient Care Team   Patient Care Team:  Jonny Cortes MD as PCP - General (Dermatology)  Destini Mayen MD as PCP - [facility] Provider    History     Patient Active Problem List   Diagnosis Code    Glaucoma H40.9    Dyslipidemia E78.5    Hypertension, benign I10    Cataract H26.9    Right foot injury S99.921A    IGT (impaired glucose tolerance) R73.02    Age-related osteoporosis without current pathological fracture M81.0    Peripheral arterial disease ([address] I73.9    Statin intolerance Z78.9     Past Medical History:   Diagnosis Date    Age-related osteoporosis without current pathological fracture 7/11/2019    Cataract 12/11/2017    Chronic kidney insufficiency 12/11/2017    Depression 12/11/2017    Dyslipidemia 12/11/2017    Fatigue 12/11/2017    GERD (gastroesophageal reflux disease)     Glaucoma 12/11/2017    Gout 12/11/2017    Hypertension     Hypertension, benign 12/11/2017    IGT (impaired glucose tolerance) 1/9/2019    On statin therapy 12/11/2017    Peripheral arterial disease ([address] 1/17/2020    Status post stent right SFA    Pre-ulcerative corn or callous 12/11/2017    Right foot injury 12/11/2017    Statin intolerance 2/25/2022      Past Surgical History:   Procedure Laterality Date    COLONOSCOPY N/A 1/6/2017    COL[facility] performed [facility]elli [facility] at [facility] ENDOSCOPY    HX HEENT      eye surgery    RI COLON CA SCRN NOT HI RSK IND  1/6/2017          Current Outpatient Medications   Medication Sig Dispense Refill    clopidogreL (PLAVIX) 75 mg tab Take 75 mg by mouth daily.  atenoloL (TENORMIN) 50 mg tablet TAKE 1 TABLET BY MOUTH TWICE DAILY 180 Tablet 3    aspirin delayed-release 81 mg tablet Take  by mouth daily.  ascorbic acid, vitamin C, (VITAMIN C) 250 mg tablet Take 250 mg by mouth daily.  CALCIUM CARBONATE/VITAMIN D3 (CALCIUM+D PO) Take 1 Tab by mouth two (2) times a day.        Allergies   Allergen Reactions    Diclofenac Unknown (comments)     Patient reports she is not allergic History reviewed. No pertinent family history. Social History     Tobacco Use    Smoking status: Former Smoker    Smokeless tobacco: Never Used   Substance Use Topics    Alcohol use:  Yes     Alcohol/week: 1.0 standard drink     Types: 1 Glasses of wine per week         Malaika Lomeli MD

## 2022-02-25 NOTE — PROGRESS NOTES
1. Have you been to the ER, urgent care clinic since your last visit? Hospitalized since your last visit? No    2. Have you seen or consulted any other health care providers outside of the [address] [place] [address] since your last visit? Include any pap smears or colon screening.  No

## 2022-03-18 PROBLEM — E78.5 DYSLIPIDEMIA: Status: ACTIVE | Noted: 2017-12-11

## 2022-03-18 PROBLEM — Z78.9 STATIN INTOLERANCE: Status: ACTIVE | Noted: 2022-02-25

## 2022-03-18 PROBLEM — S99.921A RIGHT FOOT INJURY: Status: ACTIVE | Noted: 2017-12-11

## 2022-03-18 PROBLEM — H26.9 CATARACT: Status: ACTIVE | Noted: 2017-12-11

## 2022-03-18 PROBLEM — M81.0 AGE-RELATED OSTEOPOROSIS WITHOUT CURRENT PATHOLOGICAL FRACTURE: Status: ACTIVE | Noted: 2019-07-11

## 2022-03-19 PROBLEM — R73.02 IGT (IMPAIRED GLUCOSE TOLERANCE): Status: ACTIVE | Noted: 2019-01-09

## 2022-03-19 PROBLEM — I73.9 PERIPHERAL ARTERIAL DISEASE (HCC): Status: ACTIVE | Noted: 2020-01-17

## 2022-03-19 PROBLEM — H40.9 GLAUCOMA: Status: ACTIVE | Noted: 2017-12-11

## 2022-03-20 PROBLEM — I10 HYPERTENSION, BENIGN: Status: ACTIVE | Noted: 2017-12-11

## 2022-06-26 ENCOUNTER — HOSPITAL ENCOUNTER (INPATIENT)
Age: 77
LOS: 1 days | Discharge: HOME HEALTH | DRG: 914 | End: 2022-06-27
Attending: EMERGENCY MEDICINE | Admitting: HOSPITALIST
Payer: MEDICARE

## 2022-06-26 ENCOUNTER — APPOINTMENT (OUTPATIENT)
Dept: GENERAL RADIOLOGY | Age: 77
DRG: 914 | End: 2022-06-26
Attending: EMERGENCY MEDICINE
Payer: MEDICARE

## 2022-06-26 ENCOUNTER — APPOINTMENT (OUTPATIENT)
Dept: CT IMAGING | Age: 77
DRG: 914 | End: 2022-06-26
Attending: EMERGENCY MEDICINE
Payer: MEDICARE

## 2022-06-26 DIAGNOSIS — R26.2 INABILITY TO AMBULATE DUE TO KNEE: Primary | ICD-10-CM

## 2022-06-26 DIAGNOSIS — S89.92XA INJURY OF LEFT KNEE, INITIAL ENCOUNTER: ICD-10-CM

## 2022-06-26 DIAGNOSIS — M25.469 SOFT TISSUE SWELLING OF KNEE JOINT: ICD-10-CM

## 2022-06-26 PROBLEM — M25.562 LEFT KNEE PAIN: Status: ACTIVE | Noted: 2022-06-26

## 2022-06-26 PROBLEM — I95.9 HYPOTENSION: Status: ACTIVE | Noted: 2022-06-26

## 2022-06-26 LAB
ABO + RH BLD: NORMAL
ALBUMIN SERPL-MCNC: 3.2 G/DL (ref 3.5–5)
ALBUMIN/GLOB SERPL: 1.4 {RATIO} (ref 1.1–2.2)
ALP SERPL-CCNC: 46 U/L (ref 45–117)
ALT SERPL-CCNC: 19 U/L (ref 12–78)
ANION GAP SERPL CALC-SCNC: 7 MMOL/L (ref 5–15)
AST SERPL-CCNC: 18 U/L (ref 15–37)
BASOPHILS # BLD: 0.1 K/UL (ref 0–0.1)
BASOPHILS NFR BLD: 1 % (ref 0–1)
BILIRUB SERPL-MCNC: 0.4 MG/DL (ref 0.2–1)
BLOOD GROUP ANTIBODIES SERPL: NORMAL
BUN SERPL-MCNC: 26 MG/DL (ref 6–20)
BUN/CREAT SERPL: 21 (ref 12–20)
CALCIUM SERPL-MCNC: 8.7 MG/DL (ref 8.5–10.1)
CHLORIDE SERPL-SCNC: 112 MMOL/L (ref 97–108)
CO2 SERPL-SCNC: 23 MMOL/L (ref 21–32)
COMMENT, HOLDF: NORMAL
CREAT SERPL-MCNC: 1.22 MG/DL (ref 0.55–1.02)
DIFFERENTIAL METHOD BLD: ABNORMAL
EOSINOPHIL # BLD: 0 K/UL (ref 0–0.4)
EOSINOPHIL NFR BLD: 0 % (ref 0–7)
ERYTHROCYTE [DISTWIDTH] IN BLOOD BY AUTOMATED COUNT: 13 % (ref 11.5–14.5)
GLOBULIN SER CALC-MCNC: 2.3 G/DL (ref 2–4)
GLUCOSE BLD STRIP.AUTO-MCNC: 200 MG/DL (ref 65–117)
GLUCOSE SERPL-MCNC: 174 MG/DL (ref 65–100)
HCT VFR BLD AUTO: 37 % (ref 35–47)
HGB BLD-MCNC: 11.5 G/DL (ref 11.5–16)
IMM GRANULOCYTES # BLD AUTO: 0.1 K/UL (ref 0–0.04)
IMM GRANULOCYTES NFR BLD AUTO: 0 % (ref 0–0.5)
LYMPHOCYTES # BLD: 1.8 K/UL (ref 0.8–3.5)
LYMPHOCYTES NFR BLD: 12 % (ref 12–49)
MCH RBC QN AUTO: 26.7 PG (ref 26–34)
MCHC RBC AUTO-ENTMCNC: 31.1 G/DL (ref 30–36.5)
MCV RBC AUTO: 85.8 FL (ref 80–99)
MONOCYTES # BLD: 0.7 K/UL (ref 0–1)
MONOCYTES NFR BLD: 5 % (ref 5–13)
NEUTS SEG # BLD: 11.6 K/UL (ref 1.8–8)
NEUTS SEG NFR BLD: 82 % (ref 32–75)
NRBC # BLD: 0 K/UL (ref 0–0.01)
NRBC BLD-RTO: 0 PER 100 WBC
PLATELET # BLD AUTO: 200 K/UL (ref 150–400)
PMV BLD AUTO: 10.4 FL (ref 8.9–12.9)
POTASSIUM SERPL-SCNC: 4.4 MMOL/L (ref 3.5–5.1)
PROT SERPL-MCNC: 5.5 G/DL (ref 6.4–8.2)
RBC # BLD AUTO: 4.31 M/UL (ref 3.8–5.2)
SAMPLES BEING HELD,HOLD: NORMAL
SERVICE CMNT-IMP: ABNORMAL
SODIUM SERPL-SCNC: 142 MMOL/L (ref 136–145)
SPECIMEN EXP DATE BLD: NORMAL
WBC # BLD AUTO: 14.3 K/UL (ref 3.6–11)

## 2022-06-26 PROCEDURE — 96361 HYDRATE IV INFUSION ADD-ON: CPT

## 2022-06-26 PROCEDURE — 82962 GLUCOSE BLOOD TEST: CPT

## 2022-06-26 PROCEDURE — 73502 X-RAY EXAM HIP UNI 2-3 VIEWS: CPT

## 2022-06-26 PROCEDURE — 96374 THER/PROPH/DIAG INJ IV PUSH: CPT

## 2022-06-26 PROCEDURE — 74011250636 HC RX REV CODE- 250/636: Performed by: HOSPITALIST

## 2022-06-26 PROCEDURE — 36415 COLL VENOUS BLD VENIPUNCTURE: CPT

## 2022-06-26 PROCEDURE — 74011250636 HC RX REV CODE- 250/636: Performed by: EMERGENCY MEDICINE

## 2022-06-26 PROCEDURE — 74011250637 HC RX REV CODE- 250/637: Performed by: EMERGENCY MEDICINE

## 2022-06-26 PROCEDURE — 85025 COMPLETE CBC W/AUTO DIFF WBC: CPT

## 2022-06-26 PROCEDURE — 70450 CT HEAD/BRAIN W/O DYE: CPT

## 2022-06-26 PROCEDURE — 73562 X-RAY EXAM OF KNEE 3: CPT

## 2022-06-26 PROCEDURE — 65270000029 HC RM PRIVATE

## 2022-06-26 PROCEDURE — 71045 X-RAY EXAM CHEST 1 VIEW: CPT

## 2022-06-26 PROCEDURE — 80053 COMPREHEN METABOLIC PANEL: CPT

## 2022-06-26 PROCEDURE — 99285 EMERGENCY DEPT VISIT HI MDM: CPT

## 2022-06-26 PROCEDURE — 93005 ELECTROCARDIOGRAM TRACING: CPT

## 2022-06-26 PROCEDURE — 73552 X-RAY EXAM OF FEMUR 2/>: CPT

## 2022-06-26 PROCEDURE — 86900 BLOOD TYPING SEROLOGIC ABO: CPT

## 2022-06-26 RX ORDER — HYDROCODONE BITARTRATE AND ACETAMINOPHEN 5; 325 MG/1; MG/1
1 TABLET ORAL
Status: DISCONTINUED | OUTPATIENT
Start: 2022-06-26 | End: 2022-06-27 | Stop reason: HOSPADM

## 2022-06-26 RX ORDER — SODIUM CHLORIDE 0.9 % (FLUSH) 0.9 %
5-40 SYRINGE (ML) INJECTION EVERY 8 HOURS
Status: DISCONTINUED | OUTPATIENT
Start: 2022-06-26 | End: 2022-06-27 | Stop reason: HOSPADM

## 2022-06-26 RX ORDER — ASPIRIN 81 MG/1
81 TABLET ORAL DAILY
Status: DISCONTINUED | OUTPATIENT
Start: 2022-06-27 | End: 2022-06-27 | Stop reason: HOSPADM

## 2022-06-26 RX ORDER — ONDANSETRON 2 MG/ML
4 INJECTION INTRAMUSCULAR; INTRAVENOUS
Status: DISCONTINUED | OUTPATIENT
Start: 2022-06-26 | End: 2022-06-27 | Stop reason: HOSPADM

## 2022-06-26 RX ORDER — IBUPROFEN 600 MG/1
600 TABLET ORAL
Status: DISCONTINUED | OUTPATIENT
Start: 2022-06-26 | End: 2022-06-27 | Stop reason: HOSPADM

## 2022-06-26 RX ORDER — SODIUM CHLORIDE 9 MG/ML
75 INJECTION, SOLUTION INTRAVENOUS CONTINUOUS
Status: DISCONTINUED | OUTPATIENT
Start: 2022-06-26 | End: 2022-06-27 | Stop reason: HOSPADM

## 2022-06-26 RX ORDER — SODIUM CHLORIDE 9 MG/ML
50 INJECTION, SOLUTION INTRAVENOUS ONCE
Status: DISCONTINUED | OUTPATIENT
Start: 2022-06-26 | End: 2022-06-26

## 2022-06-26 RX ORDER — ACETAMINOPHEN 650 MG/1
650 SUPPOSITORY RECTAL
Status: DISCONTINUED | OUTPATIENT
Start: 2022-06-26 | End: 2022-06-27 | Stop reason: HOSPADM

## 2022-06-26 RX ORDER — ACETAMINOPHEN 325 MG/1
650 TABLET ORAL ONCE
Status: COMPLETED | OUTPATIENT
Start: 2022-06-26 | End: 2022-06-26

## 2022-06-26 RX ORDER — ONDANSETRON 4 MG/1
4 TABLET, ORALLY DISINTEGRATING ORAL
Status: DISCONTINUED | OUTPATIENT
Start: 2022-06-26 | End: 2022-06-27 | Stop reason: HOSPADM

## 2022-06-26 RX ORDER — ATENOLOL 50 MG/1
50 TABLET ORAL 2 TIMES DAILY
Status: DISCONTINUED | OUTPATIENT
Start: 2022-06-27 | End: 2022-06-27 | Stop reason: HOSPADM

## 2022-06-26 RX ORDER — ONDANSETRON 2 MG/ML
4 INJECTION INTRAMUSCULAR; INTRAVENOUS
Status: COMPLETED | OUTPATIENT
Start: 2022-06-26 | End: 2022-06-26

## 2022-06-26 RX ORDER — ACETAMINOPHEN 325 MG/1
650 TABLET ORAL
Status: DISCONTINUED | OUTPATIENT
Start: 2022-06-26 | End: 2022-06-27 | Stop reason: HOSPADM

## 2022-06-26 RX ORDER — HYDROCODONE BITARTRATE AND ACETAMINOPHEN 5; 325 MG/1; MG/1
1 TABLET ORAL ONCE
Status: COMPLETED | OUTPATIENT
Start: 2022-06-26 | End: 2022-06-26

## 2022-06-26 RX ORDER — POLYETHYLENE GLYCOL 3350 17 G/17G
17 POWDER, FOR SOLUTION ORAL DAILY PRN
Status: DISCONTINUED | OUTPATIENT
Start: 2022-06-26 | End: 2022-06-27 | Stop reason: HOSPADM

## 2022-06-26 RX ORDER — SODIUM CHLORIDE 0.9 % (FLUSH) 0.9 %
5-40 SYRINGE (ML) INJECTION AS NEEDED
Status: DISCONTINUED | OUTPATIENT
Start: 2022-06-26 | End: 2022-06-27 | Stop reason: HOSPADM

## 2022-06-26 RX ORDER — CLOPIDOGREL BISULFATE 75 MG/1
75 TABLET ORAL DAILY
Status: DISCONTINUED | OUTPATIENT
Start: 2022-06-27 | End: 2022-06-26

## 2022-06-26 RX ADMIN — ACETAMINOPHEN 325MG 650 MG: 325 TABLET ORAL at 20:13

## 2022-06-26 RX ADMIN — ONDANSETRON 4 MG: 2 INJECTION INTRAMUSCULAR; INTRAVENOUS at 22:28

## 2022-06-26 RX ADMIN — SODIUM CHLORIDE 75 ML/HR: 9 INJECTION, SOLUTION INTRAVENOUS at 22:58

## 2022-06-26 RX ADMIN — SODIUM CHLORIDE 1000 ML: 9 INJECTION, SOLUTION INTRAVENOUS at 18:00

## 2022-06-26 RX ADMIN — HYDROCODONE BITARTRATE AND ACETAMINOPHEN 1 TABLET: 5; 325 TABLET ORAL at 20:13

## 2022-06-26 NOTE — ED PROVIDER NOTES
EMERGENCY DEPARTMENT HISTORY AND PHYSICAL EXAM      Date: 6/26/2022  Patient Name: Lazaro Madrid  Patient Age and Sex: 64 y.o. female     History of Presenting Illness     Chief Complaint   Patient presents with    Knee Pain     Pt arrives via EMS who report patient sustained a fall while playing mini-golf today. Has L knee swelling, patient denies pain at rest, reports pain with movement. History Provided By: Patient    HPI: Lazaro Madrid is a 75-year-old presenting with ground-level fall. Patient slipped and fell playing mini golf today. She fell directly onto the left knee. Patient was able to ambulate however developed progressive swelling to the knee and thigh. No head trauma, no LOC no nausea vomiting, no headache. She has not tried to ambulate following the incident. Patient is unsure of what medication she takes. She is a poor historian. Patient has been having confusion over the past 2 years, she is at her neurologic baseline, she has not bee diagnosed formally with dementia. There are no other complaints, changes, or physical findings at this time. PCP: Shanice Allen MD    No current facility-administered medications on file prior to encounter. Current Outpatient Medications on File Prior to Encounter   Medication Sig Dispense Refill    docosahexaenoic acid/epa (FISH OIL PO) Take 1 Capsule by mouth two (2) times a day.  atenoloL (TENORMIN) 50 mg tablet TAKE 1 TABLET BY MOUTH TWICE DAILY 180 Tablet 3    aspirin delayed-release 81 mg tablet Take  by mouth daily.  ascorbic acid, vitamin C, (VITAMIN C) 250 mg tablet Take 250 mg by mouth daily.  CALCIUM CARBONATE/VITAMIN D3 (CALCIUM+D PO) Take 1 Tab by mouth two (2) times a day.  clopidogreL (PLAVIX) 75 mg tab Take 75 mg by mouth daily.          Past History     Past Medical History:  Past Medical History:   Diagnosis Date    Age-related osteoporosis without current pathological fracture 7/11/2019    Cataract 12/11/2017    Chronic kidney insufficiency 12/11/2017    Depression 12/11/2017    Dyslipidemia 12/11/2017    Fatigue 12/11/2017    GERD (gastroesophageal reflux disease)     Glaucoma 12/11/2017    Gout 12/11/2017    Hypertension     Hypertension, benign 12/11/2017    IGT (impaired glucose tolerance) 1/9/2019    On statin therapy 12/11/2017    Peripheral arterial disease ([address] 1/17/2020    Status post stent right SFA    Pre-ulcerative corn or callous 12/11/2017    Right foot injury 12/11/2017    Statin intolerance 2/25/2022     Past Surgical History:  Past Surgical History:   Procedure Laterality Date    COLONOSCOPY N/A 1/6/2017    COLONOSCOPY performed by Clary Byrd MD at [facility]    HX HEENT      eye surgery    [place] COLON CA SCRN NOT  [address] IND  1/6/2017            Family History:  No family history on file. Social History:  Social History     Tobacco Use    Smoking status: Former Smoker    Smokeless tobacco: Never Used   Vaping Use    Vaping Use: Never used   Substance Use Topics    Alcohol use: Yes     Alcohol/week: 1.0 standard drink     Types: 1 Glasses of wine per week    Drug use: No       Allergies: Allergies   Allergen Reactions    Diclofenac Unknown (comments)     Patient reports she is not allergic         Review of Systems   Review of Systems   Constitutional: Negative for chills and fever. HENT: Negative for congestion and rhinorrhea. Respiratory: Negative for shortness of breath. Cardiovascular: Positive for leg swelling. Negative for chest pain. Gastrointestinal: Negative for abdominal pain, nausea and vomiting. Genitourinary: Negative for dysuria and frequency. Musculoskeletal: Positive for arthralgias. Negative for myalgias. All other systems reviewed and are negative. Physical Exam   Physical Exam  Vitals and nursing note reviewed. Constitutional:       General: She is not in acute distress.      Appearance: Normal appearance. She is not ill-appearing. HENT:      Head: Normocephalic and atraumatic. Mouth/Throat:      Mouth: Mucous membranes are moist.   Eyes:      Conjunctiva/sclera: Conjunctivae normal.   Cardiovascular:      Rate and Rhythm: Normal rate and regular rhythm. Pulmonary:      Effort: Pulmonary effort is normal.      Breath sounds: Normal breath sounds. Abdominal:      General: Abdomen is flat. Palpations: Abdomen is soft. Musculoskeletal:         General: No deformity. Comments: Significant ecchymosis and swelling to left proximal knee with underlying joint line tenderness, swelling to left thigh with ecchymosis. Pain with any range of motion of knee and thigh. Limited ligamentous laxity exam given significant soft tissue swelling. Palpable popliteal pulses bilaterally. No palpable DP or PT pulses bilaterally   Skin:     General: Skin is warm and dry. Capillary Refill: Capillary refill takes less than 2 seconds. Neurological:      Mental Status: She is alert and oriented to person, place, and time. Mental status is at baseline. Psychiatric:         Behavior: Behavior normal.         Thought Content: Thought content normal.        Diagnostic Study Results   Labs  Recent Results (from the past 12 hour(s))   GLUCOSE, POC    Collection Time: 06/26/22  5:21 PM   Result Value Ref Range    Glucose (POC) 200 (H) 65 - 117 mg/dL    Performed by Luz Yang (EDT)    SAMPLES BEING HELD    Collection Time: 06/26/22  6:48 PM   Result Value Ref Range    SAMPLES BEING HELD RED, DRK GRN, BLUE, PST     COMMENT        Add-on orders for these samples will be processed based on acceptable specimen integrity and analyte stability, which may vary by analyte.    TYPE & SCREEN    Collection Time: 06/26/22  6:49 PM   Result Value Ref Range    Crossmatch Expiration 06/29/2022,2359     ABO/Rh(D) A POSITIVE     Antibody screen NEG    CBC WITH AUTOMATED DIFF    Collection Time: 06/26/22  6:49 PM Result Value Ref Range    WBC 14.3 (H) 3.6 - 11.0 K/uL    RBC 4.31 3.80 - 5.20 M/uL    HGB 11.5 11.5 - 16.0 g/dL    HCT 37.0 35.0 - 47.0 %    MCV 85.8 80.0 - 99.0 FL    MCH 26.7 26.0 - 34.0 PG    MCHC 31.1 30.0 - 36.5 g/dL    RDW 13.0 11.5 - 14.5 %    PLATELET 601 607 - 383 K/uL    MPV 10.4 8.9 - 12.9 FL    NRBC 0.0 0  WBC    ABSOLUTE NRBC 0.00 0.00 - 0.01 K/uL    NEUTROPHILS 82 (H) 32 - 75 %    LYMPHOCYTES 12 12 - 49 %    MONOCYTES 5 5 - 13 %    EOSINOPHILS 0 0 - 7 %    BASOPHILS 1 0 - 1 %    IMMATURE GRANULOCYTES 0 0.0 - 0.5 %    ABS. NEUTROPHILS 11.6 (H) 1.8 - 8.0 K/UL    ABS. LYMPHOCYTES 1.8 0.8 - 3.5 K/UL    ABS. MONOCYTES 0.7 0.0 - 1.0 K/UL    ABS. EOSINOPHILS 0.0 0.0 - 0.4 K/UL    ABS. BASOPHILS 0.1 0.0 - 0.1 K/UL    ABS. IMM. GRANS. 0.1 (H) 0.00 - 0.04 K/UL    DF AUTOMATED     METABOLIC PANEL, COMPREHENSIVE    Collection Time: 06/26/22  6:49 PM   Result Value Ref Range    Sodium 142 136 - 145 mmol/L    Potassium 4.4 3.5 - 5.1 mmol/L    Chloride 112 (H) 97 - 108 mmol/L    CO2 23 21 - 32 mmol/L    Anion gap 7 5 - 15 mmol/L    Glucose 174 (H) 65 - 100 mg/dL    BUN 26 (H) 6 - 20 MG/DL    Creatinine 1.22 (H) 0.55 - 1.02 MG/DL    BUN/Creatinine ratio 21 (H) 12 - 20      GFR est AA 52 (L) >60 ml/min/1.73m2    GFR est non-AA 43 (L) >60 ml/min/1.73m2    Calcium 8.7 8.5 - 10.1 MG/DL    Bilirubin, total 0.4 0.2 - 1.0 MG/DL    ALT (SGPT) 19 12 - 78 U/L    AST (SGOT) 18 15 - 37 U/L    Alk.  phosphatase 46 45 - 117 U/L    Protein, total 5.5 (L) 6.4 - 8.2 g/dL    Albumin 3.2 (L) 3.5 - 5.0 g/dL    Globulin 2.3 2.0 - 4.0 g/dL    A-G Ratio 1.4 1.1 - 2.2         Radiologic Studies -   CT HEAD WO CONT   Final Result   No acute intracranial process seen      XR KNEE LT 3 V   Final Result      Normal left hip and left femur   Left prepatellar soft tissue swelling/bursitis            XR CHEST PORT   Final Result   Normal chest.      XR FEMUR LT 2 V   Final Result      Normal left hip and left femur   Left prepatellar soft tissue swelling/bursitis            XR HIP LT W OR WO PELV 2-3 VWS   Final Result      Normal left hip and left femur   Left prepatellar soft tissue swelling/bursitis              CT Results  (Last 48 hours)               06/26/22 1834  CT HEAD WO CONT Final result    Impression:  No acute intracranial process seen       Narrative:  EXAM: CT HEAD WO CONT       INDICATION: fall, syncope       COMPARISON: 2018. CONTRAST: None. TECHNIQUE: Unenhanced CT of the head was performed using 5 mm images. Brain and   bone windows were generated. Coronal and sagittal reformats. CT dose reduction   was achieved through use of a standardized protocol tailored for this   examination and automatic exposure control for dose modulation. FINDINGS:   The ventricles and sulci are normal in size, shape and configuration. . There is   no significant white matter disease. There is no intracranial hemorrhage,   extra-axial collection, or mass effect. The basilar cisterns are open. No CT   evidence of acute infarct. The bone windows demonstrate no abnormalities. The visualized portions of the   paranasal sinuses and mastoid air cells are clear. CXR Results  (Last 48 hours)               06/26/22 1834  XR CHEST PORT Final result    Impression:  Normal chest.       Narrative:  EXAM: XR CHEST PORT       INDICATION: syncope       COMPARISON: None. FINDINGS: A portable AP radiograph of the chest was obtained at 1810 hours. The   lungs are clear. The cardiac and mediastinal contours and pulmonary vascularity   are normal.  The bones and soft tissues are grossly within normal limits. Medical Decision Making   I am the first provider for this patient. I reviewed the vital signs, available nursing notes, past medical history, past surgical history, family history and social history. Vital Signs-Reviewed the patient's vital signs.   Patient Vitals for the past 12 hrs:   Temp Pulse Resp BP SpO2   06/26/22 2129 -- 77 12 116/72 96 %   06/26/22 2029 -- 86 16 98/62 96 %   06/26/22 1949 -- 78 16 118/79 97 %   06/26/22 1759 -- 76 16 (!) 81/71 98 %   06/26/22 1715 98.3 °F (36.8 °C) 93 16 123/69 95 %     Records Reviewed: Nursing Notes and Old Medical Records    Provider Notes (Medical Decision Making):   DDx ligamentous injury, osseous injury to knee or femur. Pain with range of motion at hip and knee, will obtain osseous imaging of left hip femur and knee. Limited ligamentous exam, will put in knee immobilizer and follow-up with orthopedics if negative imaging today. ED Course:   Initial assessment performed. The patients presenting problems have been discussed, and they are in agreement with the care plan formulated and outlined with them. I have encouraged them to ask questions as they arise throughout their visit.     ED Course as of 06/26/22 2149   Marisa Lamp Jun 26, 2022   1803 Plavix and aspirin, no blood thinners [WB]   1818 Large left hemarthrosis, will type and screen, fluid resuscitate [WB]   1906 No fracture, left prepatellar soft tissue swelling, likely hemarthrosis [WB]   1908 Blood pressure now elevated, she is received a full liter of IV fluid [WB]   1908 EKG obtained demonstrates normal sinus rhythm at a rate of 80, normal axis normal intervals, nonspecific ST-T wave abnormality, no STEMI [WB]   1923 Hemoglobin 1.5 white count of 14 [WB]   1938 Improved blood pressure 100s over 70s [WB]   1938 Patient does have some mild progression of swelling radiating up the leg. [WB]   1938 To perform Doppler with intact DP and PT pulses, unlikely arterial injury [WB]   1939 CMP is unremarkable [WB]   1939 No effusion on x-ray, just prepatellar soft tissue welling [WB]   1951 We will trend hemoglobin given hypotensive episode, ensure no significant bleed related to this injury. [WB]   2026 Will attempt ambulation with knee immobilizer, plan trend of hemoglobin given syncope progressive swelling to left thigh and knee [WB]   2042 Patient failed ambulatory trial will plan admission, plan trend of hemoglobin [WB]   2042 Hospitalist paged for admission [WB]      ED Course User Index  [WB] Harjit Marcelo MD     Dopplerable DP and PT pulses    CRITICAL CARE NOTE :    9:50 PM    IMPENDING DETERIORATION -Cardiovascular  ASSOCIATED RISK FACTORS - Hypotension  MANAGEMENT- Bedside Assessment and Supervision of Care  INTERPRETATION -  Blood Pressure  INTERVENTIONS - Fluid bolus, type and screen, cardiac monitoring  CASE REVIEW - Hospitalist/Intensivist, Nursing and Family  TREATMENT RESPONSE -Improved  PERFORMED BY - Self    NOTES   :  I have spent 45 minutes of critical care time involved in lab review, consultations with specialist, family decision- making, bedside attention and documentation. This time excludes time spent in any separate billed procedures. During this entire length of time I was immediately available to the patient . Kevin Cuellar MD    Disposition:  Admission Note:  Patient is being admitted to the hospital by Dr. Medina Walton, Service: Hospitalist.  The results of their tests and reasons for their admission have been discussed with them and available family. They convey agreement and understanding for the need to be admitted and for their admission diagnosis. Diagnosis     Clinical Impression:   1. Inability to ambulate due to knee    2. Soft tissue swelling of knee joint      Attestations:    Kevin Cuellar M.D. Please note that this dictation was completed with [employer], the computer voice recognition software. Quite often unanticipated grammatical, syntax, homophones, and other interpretive errors are inadvertently transcribed by the computer software. Please disregard these errors. Please excuse any errors that have escaped final proofreading. Thank you.

## 2022-06-26 NOTE — ED NOTES
Change of shift report given to Vinh Yi RN at this time. Doppler pulses of dorsalis pedis and posterior tibialis pulses present. Unable to get good angle to check for popliteal pulse. Dr. Fernie Lucero notified. Patient needs knee immobilizer at this time. Oncoming RN RN aware.

## 2022-06-27 ENCOUNTER — APPOINTMENT (OUTPATIENT)
Dept: CT IMAGING | Age: 77
DRG: 914 | End: 2022-06-27
Attending: PHYSICIAN ASSISTANT
Payer: MEDICARE

## 2022-06-27 VITALS
WEIGHT: 127.21 LBS | HEART RATE: 84 BPM | OXYGEN SATURATION: 100 % | SYSTOLIC BLOOD PRESSURE: 134 MMHG | RESPIRATION RATE: 18 BRPM | DIASTOLIC BLOOD PRESSURE: 88 MMHG | HEIGHT: 64 IN | BODY MASS INDEX: 21.72 KG/M2 | TEMPERATURE: 98 F

## 2022-06-27 LAB
ANION GAP SERPL CALC-SCNC: 11 MMOL/L (ref 5–15)
BASOPHILS # BLD: 0 K/UL (ref 0–0.1)
BASOPHILS NFR BLD: 0 % (ref 0–1)
BUN SERPL-MCNC: 25 MG/DL (ref 6–20)
BUN/CREAT SERPL: 22 (ref 12–20)
CALCIUM SERPL-MCNC: 8.7 MG/DL (ref 8.5–10.1)
CHLORIDE SERPL-SCNC: 111 MMOL/L (ref 97–108)
CO2 SERPL-SCNC: 18 MMOL/L (ref 21–32)
CREAT SERPL-MCNC: 1.14 MG/DL (ref 0.55–1.02)
DIFFERENTIAL METHOD BLD: ABNORMAL
EOSINOPHIL # BLD: 0 K/UL (ref 0–0.4)
EOSINOPHIL NFR BLD: 0 % (ref 0–7)
ERYTHROCYTE [DISTWIDTH] IN BLOOD BY AUTOMATED COUNT: 13 % (ref 11.5–14.5)
GLUCOSE SERPL-MCNC: 152 MG/DL (ref 65–100)
HCT VFR BLD AUTO: 33.6 % (ref 35–47)
HGB BLD-MCNC: 10.5 G/DL (ref 11.5–16)
IMM GRANULOCYTES # BLD AUTO: 0.1 K/UL (ref 0–0.04)
IMM GRANULOCYTES NFR BLD AUTO: 0 % (ref 0–0.5)
LYMPHOCYTES # BLD: 3.9 K/UL (ref 0.8–3.5)
LYMPHOCYTES NFR BLD: 25 % (ref 12–49)
MAGNESIUM SERPL-MCNC: 1.8 MG/DL (ref 1.6–2.4)
MCH RBC QN AUTO: 26.6 PG (ref 26–34)
MCHC RBC AUTO-ENTMCNC: 31.3 G/DL (ref 30–36.5)
MCV RBC AUTO: 85.1 FL (ref 80–99)
MONOCYTES # BLD: 0.8 K/UL (ref 0–1)
MONOCYTES NFR BLD: 5 % (ref 5–13)
NEUTS SEG # BLD: 10.5 K/UL (ref 1.8–8)
NEUTS SEG NFR BLD: 70 % (ref 32–75)
NRBC # BLD: 0 K/UL (ref 0–0.01)
NRBC BLD-RTO: 0 PER 100 WBC
PLATELET # BLD AUTO: 194 K/UL (ref 150–400)
PMV BLD AUTO: 10.5 FL (ref 8.9–12.9)
POTASSIUM SERPL-SCNC: 4.3 MMOL/L (ref 3.5–5.1)
RBC # BLD AUTO: 3.95 M/UL (ref 3.8–5.2)
SODIUM SERPL-SCNC: 140 MMOL/L (ref 136–145)
WBC # BLD AUTO: 15.3 K/UL (ref 3.6–11)

## 2022-06-27 PROCEDURE — 77030028907 HC WRP KNEE WO BGS SOLM -B

## 2022-06-27 PROCEDURE — 74011000250 HC RX REV CODE- 250: Performed by: HOSPITALIST

## 2022-06-27 PROCEDURE — 85025 COMPLETE CBC W/AUTO DIFF WBC: CPT

## 2022-06-27 PROCEDURE — 83735 ASSAY OF MAGNESIUM: CPT

## 2022-06-27 PROCEDURE — 97116 GAIT TRAINING THERAPY: CPT

## 2022-06-27 PROCEDURE — 97165 OT EVAL LOW COMPLEX 30 MIN: CPT | Performed by: OCCUPATIONAL THERAPIST

## 2022-06-27 PROCEDURE — 97161 PT EVAL LOW COMPLEX 20 MIN: CPT

## 2022-06-27 PROCEDURE — 73700 CT LOWER EXTREMITY W/O DYE: CPT

## 2022-06-27 PROCEDURE — 74011250637 HC RX REV CODE- 250/637: Performed by: HOSPITALIST

## 2022-06-27 PROCEDURE — 80048 BASIC METABOLIC PNL TOTAL CA: CPT

## 2022-06-27 PROCEDURE — 97530 THERAPEUTIC ACTIVITIES: CPT

## 2022-06-27 PROCEDURE — 97530 THERAPEUTIC ACTIVITIES: CPT | Performed by: OCCUPATIONAL THERAPIST

## 2022-06-27 PROCEDURE — 97535 SELF CARE MNGMENT TRAINING: CPT | Performed by: OCCUPATIONAL THERAPIST

## 2022-06-27 PROCEDURE — 36415 COLL VENOUS BLD VENIPUNCTURE: CPT

## 2022-06-27 RX ORDER — TRAMADOL HYDROCHLORIDE 50 MG/1
50 TABLET ORAL
Qty: 12 TABLET | Refills: 0 | Status: SHIPPED | OUTPATIENT
Start: 2022-06-27 | End: 2022-06-30

## 2022-06-27 RX ADMIN — HYDROCODONE BITARTRATE AND ACETAMINOPHEN 1 TABLET: 5; 325 TABLET ORAL at 04:35

## 2022-06-27 RX ADMIN — SODIUM CHLORIDE, PRESERVATIVE FREE 10 ML: 5 INJECTION INTRAVENOUS at 06:10

## 2022-06-27 NOTE — ED NOTES
Ambulated patient at side of bed, was unable to successfully bear weight with knee embolizer, patient became confused, blood pressure dropped to 90/60s, MD notified

## 2022-06-27 NOTE — PROGRESS NOTES
End of Shift Note    Bedside shift change report given to DANICA Bianchi (oncoming nurse) by Rony Pike RN (offgoing nurse). Report included the following information SBAR, Kardex, Intake/Output, MAR and Recent Results    Shift worked:  7p-7a     Shift summary and any significant changes:     pt is alert and confused. Pain controlled by Norco. Pt is nauseated just 1 time. pt is voiding by bed pan,      Concerns for physician to address:       Zone phone for oncoming shift:   [phone]       Activity:  Activity Level: Bed Rest  Number times ambulated in hallways past shift: 0  Number of times OOB to chair past shift: 0    Cardiac:   Cardiac Monitoring: No           Access:   Current line(s): PIV     Genitourinary:   Urinary status: voiding    Respiratory:   O2 Device: None  Chronic home O2 use?: NO  Incentive spirometer at bedside: YES       GI:  Last Bowel Movement Date: 06/26/22  Current diet:  ADULT DIET Regular  Passing flatus: YES  Tolerating current diet: YES       Pain Management:   Patient states pain is manageable on current regimen: YES    Skin:  Mike Score: 18  Interventions: nutritional support     Patient Safety:  Fall Score:  Total Score: 5  Interventions: gripper socks  High Fall Risk: Yes    Length of Stay:  Expected LOS: - - -  Actual LOS: 1      Dax Jaeger RN

## 2022-06-27 NOTE — PROGRESS NOTES
Problem: Mobility Impaired (Adult and Pediatric)  Goal: *Acute Goals and Plan of Care (Insert Text)  Description: FUNCTIONAL STATUS PRIOR TO ADMISSION: The patient is a poor historian. Patient was independent and active without use of DME.    HOME SUPPORT PRIOR TO ADMISSION: .    Physical Therapy Goals  Initiated 6/27/2022  1. Patient will move from supine to sit and sit to supine  in bed with modified independence within 7 day(s). 2.  Patient will transfer from bed to chair and chair to bed with modified independence using the least restrictive device within 7 day(s). 3.  Patient will perform sit to stand with modified independence within 7 day(s). 4.  Patient will ambulate with modified independence for 120 feet with the least restrictive device within 7 day(s). 5.  Patient will ascend/descend 12 stairs with 1 handrail(s) with modified independence within 7 day(s).    6/27/2022 1220 by Connor Casas PT  Outcome: Progressing Towards Goal   PHYSICAL THERAPY EVALUATION  Patient: Marycruz Bates (44 y.o. female)  Date: 6/27/2022  Primary Diagnosis: Left knee injury [S89.92XA]  Left knee pain, unspecified chronicity [M25.562]  Ambulatory dysfunction [R26.2]  Hypotension [I95.9]        Precautions:   Fall,Bed Alarm    ASSESSMENT  Based on the objective data described below, the patient presents with L knee pain S/P GLF. Initial evaluation was deferred until orthopedic consult but patient is observed ambulating around the room. PT entered to assist. CT indicates a large L LE hematoma and arthritis. No acute fracture is noted. Patient has removed L knee immobilizer and ambulated to the doorway. She demonstrates fair standing balance without the use of an AD and decreased L LE weight bearing in gait. Patient demonstrates once incidence of LOB when attempting to turn to ambulate in to the restroom. She is provided Min A to recover. Patient also reports nausea in standing, may benefit from orthostatics.  Patient is assisted back to bed and L LE is elevated and ice is applied. L LE is bruised and knee is swollen. Bed alarm is set. Of note patient does not have a diagnosis of dementia but demonstrates tangential story telling and inability to provide a clear history or home set up. Current Level of Function Impacting Discharge (mobility/balance): Min A     Functional Outcome Measure: The patient scored 14/28 on the Tinetti outcome measure. Other factors to consider for discharge: medical stability, increased gait/stair training      Patient will benefit from skilled therapy intervention to address the above noted impairments. PLAN :  Recommendations and Planned Interventions: bed mobility training, transfer training, gait training, therapeutic exercises, neuromuscular re-education, edema management/control, patient and family training/education, and therapeutic activities      Frequency/Duration: Patient will be followed by physical therapy:  3 times a week to address goals. Recommendation for discharge: (in order for the patient to meet his/her long term goals)  Physical therapy at least 2 days/week in the home AND ensure assist and/or supervision for safety with functional mobility     This discharge recommendation:  Has not yet been discussed the attending provider and/or case management    IF patient discharges home will need the following DME: to be determined (TBD)         SUBJECTIVE:   Patient stated where do I put this.  --patient standing in doorway inquiring about breakfast tray    OBJECTIVE DATA SUMMARY:   HISTORY:    Past Medical History:   Diagnosis Date    Age-related osteoporosis without current pathological fracture 7/11/2019    Cataract 12/11/2017    Chronic kidney insufficiency 12/11/2017    Depression 12/11/2017    Dyslipidemia 12/11/2017    Fatigue 12/11/2017    GERD (gastroesophageal reflux disease)     Glaucoma 12/11/2017    Gout 12/11/2017    Hypertension     Hypertension, benign 12/11/2017    IGT (impaired glucose tolerance) 1/9/2019    On statin therapy 12/11/2017    Peripheral arterial disease ([address] 1/17/2020    Status post stent right SFA    Pre-ulcerative corn or callous 12/11/2017    Right foot injury 12/11/2017    Statin intolerance 2/25/2022     Past Surgical History:   Procedure Laterality Date    COLONOSCOPY N/A 1/6/2017    COLONOSCOPY performed by Sd Roman MD at [facility] ENDOSCOPY    HX HEENT      eye surgery    [place] COLON CA SCRN NOT [facility] IND  1/6/2017            Personal factors and/or comorbidities impacting plan of care:     Home Situation  Patient Expects to be Discharged to[de-identified] Home with home health    EXAMINATION/PRESENTATION/DECISION MAKING:   Critical Behavior:              Hearing:     Skin:    Edema:   Range Of Motion:  AROM: Generally decreased, functional           PROM: Generally decreased, functional           Strength:    Strength: Within functional limits                    Tone & Sensation:   Tone: Normal                              Coordination:  Coordination: Generally decreased, functional  Vision:      Functional Mobility:  Bed Mobility:     Supine to Sit: Modified independent  Sit to Supine: Modified independent  Scooting: Modified independent  Transfers:  Sit to Stand: Modified independent  Stand to Sit: Modified independent        Bed to Chair: Minimum assistance              Balance:   Sitting: Intact; Without support  Standing: Impaired; Without support  Standing - Static: Constant support; Fair  Standing - Dynamic : Constant support; Fair  Ambulation/Gait Training:  Distance (ft): 25 Feet (ft)  Assistive Device: Gait belt  Ambulation - Level of Assistance: Contact guard assistance        Gait Abnormalities: Decreased step clearance                                       Stairs:               Therapeutic Exercises:       Functional Measure:  Tinetti test:    Sitting Balance: 1  Arises: 1  Attempts to Rise: 1  Immediate Standing Balance: 0  Standing Balance: 0  Nudged: 1  Eyes Closed: 0  Turn 360 Degrees - Continuous/Discontinuous: 1  Turn 360 Degrees - Steady/Unsteady: 0  Sitting Down: 0  Balance Score: 5 Balance total score  Indication of Gait: 1  R Step Length/Height: 1  L Step Length/Height: 1  R Foot Clearance: 1  L Foot Clearance: 1  Step Symmetry: 1  Step Continuity: 1  Path: 1  Trunk: 0  Walking Time: 1  Gait Score: 9 Gait total score  Total Score: 14/28 Overall total score         Tinetti Tool Score Risk of Falls  <19 = High Fall Risk  19-24 = Moderate Fall Risk  25-28 = Low Fall Risk  Tinetti ME. Performance-Oriented Assessment of Mobility Problems in Elderly Patients. [place] 64; [id number]. (Scoring Description: PT Bulletin Feb. 10, 1993)    Older adults: Taylor Johnson et al, 2009; n = 1000 [facility] elderly evaluated with ABC, ANA MARÍA, ADL, and IADL)  · Mean ANA MARÍA score for males aged 69-68 years = 26.21(3.40)  · Mean ANA MARÍA score for females age 69-68 years = 25.16(4.30)  · Mean ANA MARÍA score for males over 80 years = 23.29(6.02)  · Mean ANA MARÍA score for females over 80 years = 17.20(8.32)            Physical Therapy Evaluation Charge Determination   History Examination Presentation Decision-Making   HIGH Complexity :3+ comorbidities / personal factors will impact the outcome/ POC  LOW Complexity : 1-2 Standardized tests and measures addressing body structure, function, activity limitation and / or participation in recreation  MEDIUM Complexity : Evolving with changing characteristics  Other outcome measures Tinetti  HIGH       Based on the above components, the patient evaluation is determined to be of the following complexity level: HIGH     Pain Rating:      Activity Tolerance:   Fair    After treatment patient left in no apparent distress:   Supine in bed, Call bell within reach, Bed / chair alarm activated, and Side rails x 3    COMMUNICATION/EDUCATION:   The patients plan of care was discussed with: Registered nurse.      Fall prevention education was provided and the patient/caregiver indicated understanding., Patient/family have participated as able in goal setting and plan of care. , and Patient/family agree to work toward stated goals and plan of care.     Thank you for this referral.  Juliana Murillo, PT   Time Calculation: 27 mins

## 2022-06-27 NOTE — PROGRESS NOTES
DISCHARGE NOTE FROM [facility]    Patient determined to be stable for discharge by attending provider. I have reviewed the discharge instructions with the patient and daughter. They verbalized understanding and all questions were answered to their satisfaction. No complaints or further questions were expressed. Medications sent to pharmacy. Appropriate educational materials and medication side effect teaching were provided. PIV were removed prior to discharge. Patient did not discharge with any line, peraza, or drain. Personal items and valuables accounted for at discharge by patient and/or family: YES    Post-op patient: Yes- Patient given post-op discharge kit and instructed on use.        Xena Steel RN

## 2022-06-27 NOTE — ED NOTES
TRANSITION OF CARE - SBAR OUT    Patient is being transferred to [facility] , Room# [phone]. Report GIVEN TO DANICA Verduzco on [address], [address] for routine progression of care. Report is consisted of the following information SBAR, ED Summary, Procedure Summary, Intake/Output, MAR and Recent Results. Patient transferred to receiving unit by: ED Tech (RN or Tech Name)     Called outstanding consults: Yes . Collected routine labs: Yes . All current orders reviewed with accepting nurse: Yes    The following personal items will be sent with the patient during transfer to the floor: All valuables:                          CARDIAC MONITORING ORDERED: No  .    The following CURRENT information were reported to the receiving RN:    CODE STATUS: Full Code    NIH SCORE:    VICKI SCREENING:      NEURO ASSESSMENT:        RESTRAINTS IN USE: No      IS DOCUMENTATION COMPLETE: Yes      Vital Signs  Level of Consciousness: Alert (0) (06/26/22 1949)  Temp: 98.3 °F (36.8 °C) (06/26/22 1715)  Temp Source: Oral (06/26/22 1715)  Pulse (Heart Rate): 77 (06/26/22 2129)  Heart Rate Source: Monitor (06/26/22 1949)  Resp Rate: 12 (06/26/22 2129)  BP: 116/72 (06/26/22 2129)  MAP (Monitor): 85 (06/26/22 2129)  MAP (Calculated): 87 (06/26/22 2129)  BP 1 Location: Right upper arm (06/26/22 1949)  BP 1 Method: Automatic (06/26/22 1949)  BP Patient Position: At rest (06/26/22 1949)  MEWS Score: 1 (06/26/22 1715)  Pain 1  Pain Scale 1: Numeric (0 - 10) (06/26/22 1715)  Pain Intensity 1: 0 (06/26/22 1715)      OXYGEN: Oxygen Therapy  O2 Device: None (06/26/22 1759)    SHANNON FALL RISK:        WOUNDS: No  .    URINARY CATHETER: voiding    LINE ACCESS:   Peripheral IV 06/26/22 Anterior;Distal;Right Forearm (Active)        Opportunity for questions and clarification were provided.   Ching Sanchez RN

## 2022-06-27 NOTE — PROGRESS NOTES
Hospitalist Progress Note    NAME: Sharon Montiel   :  1945   MRN:  [id number]       Assessment / Plan:    Left knee pain s/p ground-level mechanical fall  Inability to ambulate due to knee   Soft tissue swelling of knee joint  to rule out hemarthrosis  No effusion on x-rays, just prepatellar soft tissue welling  XR HIP LT W OR WO PELV 2-3 VWS, XR FEMUR LT 2 V, XR KNEE LT 3 V   IMPRESSION Normal left hip and left femur  Left prepatellar soft tissue swelling/bursitis     Pain control  Ortho consulted  Plan for CT of the lower extremity  PT OT  Closely monitor H&H  Continue supportive symptomatic treatment  No weightbearing to the left leg  knee immobilizer   Keep LLE elevated        EKG  normal sinus rhythm at a rate of 80, normal axis normal intervals, nonspecific ST-T wave abnormality, no STEMI      Leukocytosis  Likely reactive  Monitor     HTN on atenolol  HLD  Statin intolrant  PAD on asa               Code Status: Full code  Surrogate Decision Maker:  Lyric Hinds [phone]         DVT Prophylaxis: No anticoagulation secondary to hematoma possible hemarthrosis  GI Prophylaxis: not indicated     Baseline: Independent       Subjective:     Chief Complaint / Reason for Physician Visit  [facility] Patient concerned about her left knee swelling. Still with some pain. Forgetful. Discussed with RN events overnight. Review of Systems:  Symptom Y/N Comments  Symptom Y/N Comments   Fever/Chills    Chest Pain     Poor Appetite    Edema     Cough    Abdominal Pain     Sputum    Joint Pain     SOB/LIRIANO    Pruritis/Rash     Nausea/vomit    Tolerating PT/OT     Diarrhea    Tolerating Diet     Constipation    Other       Could NOT obtain due to:      Objective:     VITALS:   Last 24hrs VS reviewed since prior progress note.  Most recent are:  Patient Vitals for the past 24 hrs:   Temp Pulse Resp BP SpO2   22 0731 97.9 °F (36.6 °C) 76 18 139/88 100 % 06/27/22 0325 97.9 °F (36.6 °C) 75 17 (!) 118/90 97 %   06/26/22 2336 97.8 °F (36.6 °C) 76 16 116/87 96 %   06/26/22 2129 -- 77 12 116/72 96 %   06/26/22 2029 -- 86 16 98/62 96 %   06/26/22 1949 -- 78 16 118/79 97 %   06/26/22 1759 -- 76 16 (!) 81/71 98 %   06/26/22 1715 98.3 °F (36.8 °C) 93 16 123/69 95 %       Intake/Output Summary (Last 24 hours) at 6/27/2022 9783  Last data filed at 6/27/2022 0530  Gross per 24 hour   Intake 1000 ml   Output 200 ml   Net 800 ml        I had a face to face encounter and independently examined this patient on 6/27/2022, as outlined below:  PHYSICAL EXAM:  General: WD, WN. Alert, cooperative, no acute distress    EENT:  EOMI. Anicteric sclerae. MMM  Resp:  CTA bilaterally, no wheezing or rales. No accessory muscle use  CV:  Regular  rhythm,  No edema  GI:  Soft, Non distended, Non tender. +Bowel sounds  Neurologic:  Alert and oriented X 3, normal speech, forgetful  MSK                Left knee immobilizer. Large amount of left knee swelling                Psych:   Fair insight. Not anxious nor agitated  Skin:  No rashes. No jaundice    Reviewed most current lab test results and cultures  YES  Reviewed most current radiology test results   YES  Review and summation of old records today    NO  Reviewed patient's current orders and MAR    YES  PMH/ reviewed - no change compared to H&P  ________________________________________________________________________  Care Plan discussed with:    Comments   Patient     Family      RN     Care Manager     Consultant                        Multidiciplinary team rounds were held today with , nursing, pharmacist and clinical coordinator. Patient's plan of care was discussed; medications were reviewed and discharge planning was addressed.      ________________________________________________________________________  Total NON critical care TIME:  33   Minutes    Total CRITICAL CARE TIME Spent:   Minutes non procedure based Comments   >50% of visit spent in counseling and coordination of care     ________________________________________________________________________  Apolinar Cruz MD     Procedures: see electronic medical records for all procedures/Xrays and details which were not copied into this note but were reviewed prior to creation of Plan. LABS:  I reviewed today's most current labs and imaging studies.   Pertinent labs include:  Recent Labs     06/27/22  0445 06/26/22  1849   WBC 15.3* 14.3*   HGB 10.5* 11.5   HCT 33.6* 37.0    200     Recent Labs     06/27/22  0445 06/26/22  1849    142   K 4.3 4.4   * 112*   CO2 18* 23   * 174*   BUN 25* 26*   CREA 1.14* 1.22*   CA 8.7 8.7   MG 1.8  --    ALB  --  3.2*   TBILI  --  0.4   ALT  --  19       Signed: Apolinar Cruz MD

## 2022-06-27 NOTE — PROGRESS NOTES
Physical Therapy Note    PT orders received and acknowledged. Patient with L knee pain S/P fall and pending orthopedic consult. Will hold for orthopedic consult. Please prescribe weight bearing orders and clarify use of knee immobilizer.

## 2022-06-27 NOTE — CONSULTS
GLF yesterday. xrays in ED- no fracture  Pain and swelling left knee, unable to ambulate in    A brace. Admitted for placement and pain control    Patient Vitals for the past 24 hrs:   Temp Pulse Resp BP SpO2   06/27/22 0731 97.9 °F (36.6 °C) 76 18 139/88 100 %   06/27/22 0325 97.9 °F (36.6 °C) 75 17 (!) 118/90 97 %   06/26/22 2336 97.8 °F (36.6 °C) 76 16 116/87 96 %   06/26/22 2129 -- 77 12 116/72 96 %   06/26/22 2029 -- 86 16 98/62 96 %   06/26/22 1949 -- 78 16 118/79 97 %   06/26/22 1759 -- 76 16 (!) 81/71 98 %   06/26/22 1715 98.3 °F (36.8 °C) 93 16 123/69 95 %     Let knee with bruising over quad tendon  Extensor mech intact  Able to actively straight leg raise    Imaging :FINDINGS: Bones: Mild osteopenia. No acute fracture. No suspicious bone lesion.     Joint fluid: None.     Articulations: Minimal left hip osteoarthritis. Mild left knee arthritis and  meniscal/capsular chondrocalcinosis. No erosion.     Tendons: Extensor mechanism is intact. Left hamstring origin calcific  tendinosis. Ankle tendons are grossly intact.     Muscles: Mild diffuse atrophy. Edema superficial to the distal vastus medialis  muscle. No gas.     Soft tissue mass: No mass. Soft tissue swelling. Hyperattenuating fluid in the  subcutaneous adipose tissues medial to the knee and vastus medialis muscles  measures 10.8 x 2.9 x 32 cm. Vascular stent is in the femoral artery. Colonic  diverticulosis is partially imaged.     IMPRESSION  1. Large presumed hematoma in the subcutaneous adipose tissues medial in the  distal thigh and medial to the knee measures 32 cm in craniocaudal extent. Consider posttraumatic hemorrhage versus spontaneous hemorrhage in the setting  of blood thinners. 2. No fracture. 3. Left knee arthritis and chondrocalcinosis. Differential diagnosis includes  chronic osteoarthritis versus CPPD arthropathy.     Plan  Hematoma left knee over quad tendon  Ice , elevate  Ok to weight bear in the brace  Progress as pain allows.   follow up OP 10 days to 2 weeks as needed

## 2022-06-27 NOTE — DISCHARGE INSTRUCTIONS
Patient Education        Joint Pain: Care Instructions  Your Care Instructions     Many people have small aches and pains from overuse or injury to muscles and joints. Joint injuries often happen during sports or recreation, work tasks, or projects around the home. An overuse injury can happen when you put too much stress on a joint or when you do an activity that stresses the joint over and over, such as using the computer or rowing a boat. You can take action at home to help your muscles and joints get better. You should feel better in 1 to 2 weeks, but it can take 3 months or more to heal completely. Follow-up care is a key part of your treatment and safety. Be sure to make and go to all appointments, and call your doctor if you are having problems. It's also a good idea to know your test results and keep a list of the medicines you take. How can you care for yourself at home? · Do not put weight on the injured joint for at least a day or two. · For the first day or two after an injury, do not take hot showers or baths, and do not use hot packs. The heat could make swelling worse. · Put ice or a cold pack on the sore joint for 10 to 20 minutes at a time. Try to do this every 1 to 2 hours for the next 3 days (when you are awake) or until the swelling goes down. Put a thin cloth between the ice and your skin. · Wrap the injury in an elastic bandage. Do not wrap it too tightly because this can cause more swelling. · Prop up the sore joint on a pillow when you ice it or anytime you sit or lie down during the next 3 days. Try to keep it above the level of your heart. This will help reduce swelling. · Take an over-the-counter pain medicine, such as acetaminophen (Tylenol), ibuprofen (Advil, Motrin), or naproxen (Aleve). Read and follow all instructions on the label. · After 1 or 2 days of rest, begin moving the joint gently.  While the joint is still healing, you can begin to exercise using activities that do not strain or hurt the painful joint. When should you call for help? Call your doctor now or seek immediate medical care if:    · You have signs of infection, such as:  ? Increased pain, swelling, warmth, and redness. ? Red streaks leading from the joint. ? A fever. Watch closely for changes in your health, and be sure to contact your doctor if:    · Your movement or symptoms are not getting better after 1 to 2 weeks of home treatment. Where can you learn more? Go to http://www.gray.com/  Enter P205 in the search box to learn more about \"Joint Pain: Care Instructions. \"  Current as of: July 1, 2021               Content Version: 13.2  © 2006-2022 [employer]. Care instructions adapted under license by [employer] (which disclaims liability or warranty for this information). If you have questions about a medical condition or this instruction, always ask your healthcare professional. [place] [address] any warranty or liability for your use of this information. Patient Education        Learning About Joint Effusion  What is it? Fluid is normally found in joints such as knees, hips, and elbows. When too much fluid builds up around a joint in your body, it's called joint effusion. When you have this problem, your joint may look swollen. What causes it? Many things can cause fluid buildup in a joint. It may be caused by a condition like osteoarthritis, rheumatoid arthritis, or gout. It may also happen because of an infection. Or it can happen because of an injury, like a twisting fall. What are the symptoms? You might feel pain when you try to straighten a joint where you have fluid buildup. Your joint may be stiff or swollen. How is it diagnosed? Your doctor will do a physical exam. You may need an X-ray. You may need other imaging tests, like an MRI or a CT scan. Your doctor may remove some fluid from your joint to learn more. This is called aspiration. It's done by using a needle to drain fluid from your joint. How is it treated? Your doctor may suggest rest, ice, and raising the joint (elevation) to help with pain and swelling. The fluid might be drained from the area. Your doctor may suggest using nonprescription anti-inflammatory drugs (NSAIDs) or getting a steroid shot. Or you may need surgery to repair damage. Follow-up care is a key part of your treatment and safety. Be sure to make and go to all appointments, and call your doctor if you are having problems. It's also a good idea to know your test results and keep a list of the medicines you take. Current as of: December 20, 2021               Content Version: 13.2  © 2006-2022 [employer]. Care instructions adapted under license by [employer] (which disclaims liability or warranty for this information). If you have questions about a medical condition or this instruction, always ask your healthcare professional. [place] [address] any warranty or liability for your use of this information.

## 2022-06-27 NOTE — DISCHARGE SUMMARY
Hospitalist Discharge Summary     Patient ID:  Jerry Ramirez  [id number]  64 y.o.  1945    PCP on record: Randall Barry MD    Admit date: 6/26/2022  Discharge date and time: 6/27/2022      DISCHARGE DIAGNOSIS:      Left knee pain s/p ground-level mechanical fall    CONSULTATIONS:  IP CONSULT TO HOSPITALIST  IP CONSULT TO ORTHOPEDIC SURGERY    Excerpted HPI from H&P of Ina Serrato MD:    Left knee pain s/p ground-level mechanical fall  Inability to ambulate due to knee   Soft tissue swelling of knee joint  to rule out hemarthrosis  No effusion on x-rays, just prepatellar soft tissue welling  XR HIP LT W OR WO PELV 2-3 VWS, XR FEMUR LT 2 V, XR KNEE LT 3 V   IMPRESSION Normal left hip and left femur  Left prepatellar soft tissue swelling/bursitis        Admit to hospitalist service  Pain control  Ortho consulted  PT OT  Closely monitor H&H  Continue supportive symptomatic treatment  No weightbearing to the left leg  knee immobilizer        Transient hypotension in the ED 81/71 which resolved after receiving 1 L normal saline. Currently 116/72. Will monitor  EKG  normal sinus rhythm at a rate of 80, normal axis normal intervals, nonspecific ST-T wave abnormality, no STEMI         HTN on atenolol  HLD  Statin intolrant  PAD on asa               Code Status: Full code  Surrogate Decision Maker:  Miltonhenrique Johnnie [phone]         DVT Prophylaxis: No anticoagulation secondary to hematoma possible hemarthrosis  GI Prophylaxis: not indicated     Baseline: Independent    ______________________________________________________________________  DISCHARGE SUMMARY/HOSPITAL COURSE:  for full details see H&P, daily progress notes, labs, consult notes. Patient was seen by ortho and underwent a CT of the leg which showed 1. Large presumed hematoma in the subcutaneous adipose tissues medial in the  distal thigh and medial to the knee measures 32 cm in craniocaudal extent.   Consider posttraumatic hemorrhage versus spontaneous hemorrhage in the setting  of blood thinners. 2. No fracture. 3. Left knee arthritis and chondrocalcinosis. Differential diagnosis includes  chronic osteoarthritis versus CPPD arthropathy. Ortho recommended ice and elevation. Ok to weight bear with brace. Needs to follow up in 2 weeks in the office. Patient worked with PT who recommended home PT. She did not require pain meds during her hospitlization. She was stable for DC.      _______________________________________________________________________  Patient seen and examined by me on discharge day. Pertinent Findings:  General:          WD, WN. Alert, cooperative, no acute distress    EENT:              EOMI. Anicteric sclerae. MMM  Resp:               CTA bilaterally, no wheezing or rales. No accessory muscle use  CV:                  Regular  rhythm,  No edema  GI:                   Soft, Non distended, Non tender.  +Bowel sounds  Neurologic:       Alert and oriented X 3, normal speech, forgetful  MSK                Left knee immobilizer. Large amount of left knee swelling                Psych:   Fair insight. Not anxious nor agitated  Skin:                No rashes. No jaundice  _______________________________________________________________________  DISCHARGE MEDICATIONS:   Current Discharge Medication List      START taking these medications    Details   traMADoL (Ultram) 50 mg tablet Take 1 Tablet by mouth every six (6) hours as needed for Pain for up to 3 days. Max Daily Amount: 200 mg. Qty: 12 Tablet, Refills: 0  Start date: 6/27/2022, End date: 6/30/2022    Associated Diagnoses: Injury of left knee, initial encounter         CONTINUE these medications which have NOT CHANGED    Details   docosahexaenoic acid/epa (FISH OIL PO) Take 1 Capsule by mouth two (2) times a day.       atenoloL (TENORMIN) 50 mg tablet TAKE 1 TABLET BY MOUTH TWICE DAILY  Qty: 180 Tablet, Refills: 3      aspirin delayed-release 81 mg tablet Take  by mouth daily. ascorbic acid, vitamin C, (VITAMIN C) 250 mg tablet Take 250 mg by mouth daily. CALCIUM CARBONATE/VITAMIN D3 (CALCIUM+D PO) Take 1 Tab by mouth two (2) times a day. STOP taking these medications       clopidogreL (PLAVIX) 75 mg tab Comments:   Reason for Stopping:               My Recommended Diet, Activity, Wound Care, and follow-up labs are listed in the patient's Discharge Insturctions which I have personally completed and reviewed.   Risk of deterioration: Low    Condition at Discharge:  Stable  _____________________________________________________________________    Disposition  Home with family and home health services  ____________________________________________________________________    Care Plan discussed with:   Patient, Family, RN, Care Manager, Consultant    ____________________________________________________________________    Code Status: Full Code  ____________________________________________________________________      Condition at Discharge:  Stable  _____________________________________________________________________  Follow up with:   PCP : Shannan Bae MD  Follow-up Information     Follow up With Specialties Details Why Contact Info    Jaxson Bellamy MD Orthopedic Surgery   [address]  [address] [facility] [phone]      Shannan Bae MD Dermatology Physician   [facility]  [address]  [phone]      Rachel Philip MD Internal Medicine Physician   [address]  [facility]  [phone]      [facility], [address] [place] [address],[address] Floor Surgery   [address] [facility]  [address] [place] [address],[address]  [address] [phone]                Total time in minutes spent coordinating this discharge (includes going over instructions, follow-up, prescriptions, and preparing report for sign off to her PCP) :  35 minutes    Signed:  Rosita Sanchez MD

## 2022-06-27 NOTE — H&P
Hospitalist Admission Note    NAME: Ga Guerrero   :  1945   MRN:  [id number]     Date/Time:  2022 10:23 PM    Patient PCP: Aleksandra Mishra MD  _____________________________________________________________________  Given the patient's current clinical presentation, I have a high level of concern for decompensation if discharged from the emergency department. Complex decision making was performed, which includes reviewing the patient's available past medical records, laboratory results, and x-ray films. My assessment of this patient's clinical condition and my plan of care is as follows. Assessment / Plan:    Left knee pain s/p ground-level mechanical fall  Inability to ambulate due to knee   Soft tissue swelling of knee joint  to rule out hemarthrosis  No effusion on x-rays, just prepatellar soft tissue welling  XR HIP LT W OR WO PELV 2-3 VWS, XR FEMUR LT 2 V, XR KNEE LT 3 V   IMPRESSION Normal left hip and left femur  Left prepatellar soft tissue swelling/bursitis      Admit to hospitalist service  Pain control  Ortho consulted  PT OT  Closely monitor H&H  Continue supportive symptomatic treatment  No weightbearing to the left leg  knee immobilizer      Transient hypotension in the ED 81/71 which resolved after receiving 1 L normal saline. Currently 116/72. Will monitor  EKG  normal sinus rhythm at a rate of 80, normal axis normal intervals, nonspecific ST-T wave abnormality, no STEMI       HTN on atenolol  HLD  Statin intolrant  PAD on asa           Code Status: Full code  Surrogate Decision Maker:  Addi Portillo [phone]       DVT Prophylaxis: No anticoagulation secondary to hematoma possible hemarthrosis  GI Prophylaxis: not indicated    Baseline: Independent        Subjective:   CHIEF COMPLAINT:  Knee Pain  Pt arrives via EMS who report patient sustained a fall while playing mini-golf today.  Has L knee swelling, patient denies pain at rest, reports pain with movement. HISTORY OF PRESENT ILLNESS:      Ann Marie Urban is a 73-year-old presenting with ground-level fall. Patient slipped and fell playing mini golf today. She fell directly onto the left knee. Patient was able to ambulate however developed progressive swelling to the knee and thigh. No head trauma, no LOC no nausea vomiting, no headache. She has not tried to ambulate following the incident. Patient is unsure of what medication she takes. She is a poor historian. Patient has been having confusion over the past 2 years, she is at her neurologic baseline, she has not bee diagnosed formally with dementia. ED ambulated patient at side of bed, was unable to successfully bear weight with knee embolizer, patient became confused, blood pressure dropped to 90/60s,         ED Course    Patient does have some mild progression of swelling radiating up the leg. To perform Doppler with intact DP and PT pulses, unlikely arterial injury [WB]  1941  No effusion on x-ray, just prepatellar soft tissue welling [WB]  1953  We will trend hemoglobin given hypotensive episode, ensure no significant bleed related to this injury. Will attempt ambulation with knee immobilizer, plan trend of hemoglobin given syncope progressive swelling to left thigh and knee     Patient failed ambulatory trial will plan admission, plan trend of hemoglobin [WB]      Vital Signs-Reviewed the patient's vital signs.   Patient Vitals for the past 12 hrs:    Temp Pulse Resp BP SpO2   06/26/22 2129 -- 77 12 116/72 96 %   06/26/22 2029 -- 86 16 98/62 96 %   06/26/22 1949 -- 78 16 118/79 97 %   06/26/22 1759 -- 76 16 (!) 81/71 98 %   06/26/22 1715 98.3 °F (36.8 °C) 93 16 123/69 95 %       Past Medical History:   Diagnosis Date    Age-related osteoporosis without current pathological fracture 7/11/2019    Cataract 12/11/2017    Chronic kidney insufficiency 12/11/2017    Depression 12/11/2017    Dyslipidemia 12/11/2017    Fatigue 12/11/2017 GERD (gastroesophageal reflux disease)     Glaucoma 12/11/2017    Gout 12/11/2017    Hypertension     Hypertension, benign 12/11/2017    IGT (impaired glucose tolerance) 1/9/2019    On statin therapy 12/11/2017    Peripheral arterial disease ([address] 1/17/2020    Status post stent right SFA    Pre-ulcerative corn or callous 12/11/2017    Right foot injury 12/11/2017    Statin intolerance 2/25/2022        Past Surgical History:   Procedure Laterality Date    COLONOSCOPY N/A 1/6/2017    COLONOSCOPY performed by Juan Spence MD at [facility] ENDOSCOPY    HX HEENT      eye surgery    UT COLON CA SCRN NOT HI RSK IND  1/6/2017            Social History     Tobacco Use    Smoking status: Former Smoker    Smokeless tobacco: Never Used   Substance Use Topics    Alcohol use: Yes     Alcohol/week: 1.0 standard drink     Types: 1 Glasses of wine per week        No family history on file. Allergies   Allergen Reactions    Diclofenac Unknown (comments)     Patient reports she is not allergic        Prior to Admission medications    Medication Sig Start Date End Date Taking? Authorizing Provider   docosahexaenoic acid/epa (FISH OIL PO) Take 1 Capsule by mouth two (2) times a day. Yes Other, MD Fredy   atenoloL (TENORMIN) 50 mg tablet TAKE 1 TABLET BY MOUTH TWICE DAILY 10/12/21  Yes Komal Hess MD   aspirin delayed-release 81 mg tablet Take  by mouth daily. Yes Provider, Historical   ascorbic acid, vitamin C, (VITAMIN C) 250 mg tablet Take 250 mg by mouth daily. Yes Provider, Historical   CALCIUM CARBONATE/VITAMIN D3 (CALCIUM+D PO) Take 1 Tab by mouth two (2) times a day. Yes Provider, Historical       REVIEW OF SYSTEMS:     I am not able to complete the review of systems because:    The patient is intubated and sedated    The patient has altered mental status due to his acute medical problems    The patient has baseline aphasia from prior stroke(s)    The patient has baseline dementia and is not reliable historian    The patient is in acute medical distress and unable to provide information         Constitutional: Negative for chills and fever. HENT: Negative for congestion and rhinorrhea. Respiratory: Negative for shortness of breath. Cardiovascular: Positive for leg swelling. Negative for chest pain. Gastrointestinal: Negative for abdominal pain, nausea and vomiting. Genitourinary: Negative for dysuria and frequency. Musculoskeletal: Positive for arthralgias. Negative for myalgias. All other systems reviewed and are negative. Objective:   VITALS:    Visit Vitals  /72   Pulse 77   Temp 98.3 °F (36.8 °C)   Resp 12   Ht 5' 3.5\" (1.613 m)   SpO2 96%   BMI 22.18 kg/m²       PHYSICAL EXAM:    Constitutional:       General: She is not in acute distress. Appearance: Normal appearance. She is not ill-appearing. HENT:      Head: Normocephalic and atraumatic. Mouth/Throat:      Mouth: Mucous membranes are moist.   Eyes:      Conjunctiva/sclera: Conjunctivae normal.   Cardiovascular:      Rate and Rhythm: Normal rate and regular rhythm. Pulmonary:      Effort: Pulmonary effort is normal.      Breath sounds: Normal breath sounds. Abdominal:      General: Abdomen is flat. Palpations: Abdomen is soft. Musculoskeletal:         General: No deformity. Comments: Significant ecchymosis and swelling to left proximal knee with underlying joint line tenderness, swelling to left thigh with ecchymosis. Pain with any range of motion of knee and thigh. Limited ligamentous laxity exam given significant soft tissue swelling. Palpable popliteal pulses bilaterally. No palpable DP or PT pulses bilaterally   Skin:     General: Skin is warm and dry. Capillary Refill: Capillary refill takes less than 2 seconds. Neurological:      Mental Status: She is alert and oriented to person, place, and time. Mental status is at baseline.    Psychiatric:         Behavior: Behavior normal. Thought Content: Thought content normal.          _______________________________________________________________________  Care Plan discussed with:    Comments   Patient Y    Family      RN Y    Care Manager                    Consultant:  Y ED MD   _______________________________________________________________________  Expected  Disposition:   Home with Family TBD   HH/PT/OT/RN    SNF/LTC    HAMLET    ________________________________________________________________________  TOTAL TIME:   54  Minutes    Critical Care Provided     Minutes non procedure based      Comments    X Reviewed previous records   >50% of visit spent in counseling and coordination of care X Discussion with patient and/or family and questions answered       Given the patient's current clinical presentation, I have a high level of concern for decompensation if discharged from the ED. Complex decision making was performed which includes reviewing the patient's available past medical records, laboratory results, and Xray films. I have also directly communicated my plan and discussed this case with the involved ED physician.     ____________________________________________________________________  Teri Muñoz MD    Procedures: see electronic medical records for all procedures/Xrays and details which were not copied into this note but were reviewed prior to creation of Plan. LAB DATA REVIEWED:    Recent Results (from the past 24 hour(s))   GLUCOSE, POC    Collection Time: 06/26/22  5:21 PM   Result Value Ref Range    Glucose (POC) 200 (H) 65 - 117 mg/dL    Performed by Eric Nickerson (EDT)    SAMPLES BEING HELD    Collection Time: 06/26/22  6:48 PM   Result Value Ref Range    SAMPLES BEING HELD RED, DRK GRN, BLUE, PST     COMMENT        Add-on orders for these samples will be processed based on acceptable specimen integrity and analyte stability, which may vary by analyte.    TYPE & SCREEN    Collection Time: 06/26/22  6:49 PM   Result Value Ref Range    Crossmatch Expiration 06/29/2022,2359     ABO/Rh(D) A POSITIVE     Antibody screen NEG    CBC WITH AUTOMATED DIFF    Collection Time: 06/26/22  6:49 PM   Result Value Ref Range    WBC 14.3 (H) 3.6 - 11.0 K/uL    RBC 4.31 3.80 - 5.20 M/uL    HGB 11.5 11.5 - 16.0 g/dL    HCT 37.0 35.0 - 47.0 %    MCV 85.8 80.0 - 99.0 FL    MCH 26.7 26.0 - 34.0 PG    MCHC 31.1 30.0 - 36.5 g/dL    RDW 13.0 11.5 - 14.5 %    PLATELET 015 011 - 075 K/uL    MPV 10.4 8.9 - 12.9 FL    NRBC 0.0 0  WBC    ABSOLUTE NRBC 0.00 0.00 - 0.01 K/uL    NEUTROPHILS 82 (H) 32 - 75 %    LYMPHOCYTES 12 12 - 49 %    MONOCYTES 5 5 - 13 %    EOSINOPHILS 0 0 - 7 %    BASOPHILS 1 0 - 1 %    IMMATURE GRANULOCYTES 0 0.0 - 0.5 %    ABS. NEUTROPHILS 11.6 (H) 1.8 - 8.0 K/UL    ABS. LYMPHOCYTES 1.8 0.8 - 3.5 K/UL    ABS. MONOCYTES 0.7 0.0 - 1.0 K/UL    ABS. EOSINOPHILS 0.0 0.0 - 0.4 K/UL    ABS. BASOPHILS 0.1 0.0 - 0.1 K/UL    ABS. IMM. GRANS. 0.1 (H) 0.00 - 0.04 K/UL    DF AUTOMATED     METABOLIC PANEL, COMPREHENSIVE    Collection Time: 06/26/22  6:49 PM   Result Value Ref Range    Sodium 142 136 - 145 mmol/L    Potassium 4.4 3.5 - 5.1 mmol/L    Chloride 112 (H) 97 - 108 mmol/L    CO2 23 21 - 32 mmol/L    Anion gap 7 5 - 15 mmol/L    Glucose 174 (H) 65 - 100 mg/dL    BUN 26 (H) 6 - 20 MG/DL    Creatinine 1.22 (H) 0.55 - 1.02 MG/DL    BUN/Creatinine ratio 21 (H) 12 - 20      GFR est AA 52 (L) >60 ml/min/1.73m2    GFR est non-AA 43 (L) >60 ml/min/1.73m2    Calcium 8.7 8.5 - 10.1 MG/DL    Bilirubin, total 0.4 0.2 - 1.0 MG/DL    ALT (SGPT) 19 12 - 78 U/L    AST (SGOT) 18 15 - 37 U/L    Alk.  phosphatase 46 45 - 117 U/L    Protein, total 5.5 (L) 6.4 - 8.2 g/dL    Albumin 3.2 (L) 3.5 - 5.0 g/dL    Globulin 2.3 2.0 - 4.0 g/dL    A-G Ratio 1.4 1.1 - 2.2

## 2022-06-27 NOTE — PROGRESS NOTES
Transition of Care Plan:  RUR: 11% low   Disposition: home with home health- at home care  Follow up appointments: PCP, speciality   DME needed: none identified   Transportation at Discharge: daughter- at bedside  Keys or means to access home: yes  IM Medicare Letter: provided  Caregiver Contact: daughter Josh Jesus [phone]  Discharge Caregiver contacted prior to discharge? Yes- at bedside   Care Conference needed?:  no                 Reason for Admission:  left knee injury                   RUR Score:  11%                   Plan for utilizing home health: per recommendation          PCP: First and Last name:  Komal Urbano MD   Name of Practice: [facility]    Are you a current patient: Yes/No: yes    Approximate date of last visit: 2/25/22   Can you participate in a virtual visit with your PCP: yes                    Current Advanced Directive/Advance Care Plan: pt declined                   Transition of Care Plan:                      CM made room visit with patient and daughter at bedside. Pt confirmed demographics, insurance, and emergency contact on file. Pt lives alone in a 2 story home with 3 jennyfer. Dtr lives down the road. Pt has no DME and is independent with ADLs and driving at baseline. Pt has no hx of HH, SNF, or IPR. Pt's plan is to d/c home with home health. FOC signed and placed on bedside chart. Referral sent to [facility] [address] and accepted. AVS updated. Care Management Interventions  PCP Verified by CM: Yes  Mode of Transport at Discharge:  Other (see comment) (daughter)  Transition of Care Consult (CM Consult): Discharge Planning,Home Health  Discharge Durable Medical Equipment: No  Physical Therapy Consult: Yes  Occupational Therapy Consult: No  Speech Therapy Consult: No  Support Systems: Child(latoya)  Confirm Follow Up Transport: Family  The Plan for Transition of Care is Related to the Following Treatment Goals : hh  The Patient and/or Patient Representative was Provided with a Choice of Provider and Agrees with the Discharge Plan?: Yes  Freedom of Choice List was Provided with Basic Dialogue that Supports the Patient's Individualized Plan of Care/Goals, Treatment Preferences and Shares the Quality Data Associated with the Providers?: Yes  Discharge Location  Patient Expects to be Discharged to[de-identified] Home with home health    Priscilla AlamoLens, [address] [facility]

## 2022-06-27 NOTE — PROGRESS NOTES
OCCUPATIONAL THERAPY EVALUATION/DISCHARGE  Patient: Megan Monday (49 y.o. female)  Date: 6/27/2022  Primary Diagnosis: Left knee injury [S89.92XA]  Left knee pain, unspecified chronicity [M25.562]  Ambulatory dysfunction [R26.2]  Hypotension [I95.9]       Precautions:   Fall,Bed Alarm    ASSESSMENT  Based on the objective data described below, the patient presents s/p fall with resultant L knee pain and swelling, now with hematoma on left knee over quad tendon. Patient permitted to WB wearing knee immobilizer in room, however patient has been up ambulating without the immobilizer, essentially independently and without pain. The immobilizer was donned during this evaluation, but patient was more unsteady during ambulation with the knee immobilizer and appeared to be at risk for falls. Patient is currently performing ADLs and functional mobility at her independent baseline and she is without further acute OT needs. Nursing is attempting to contact ortho PA to clarify the importance of the patient wearing the knee immobilizer, given that she is not having any pain during mobility without the brace and is more unsteady when wearing the brace. The patient will benefit from [facility] and PT for a safety evaluation after discharge, and she will also benefit from supervision initially after returning home. Functional Outcome Measure: The patient scored 90/100 on the Barthel Index outcome measure which is indicative of a 10% decline in function.          PLAN :  Recommendation for discharge: (in order for the patient to meet his/her long term goals)  Occupational therapy at least 2 days/week in the home AND ensure assist and/or supervision for safety with IADLs    Equipment recommendations for successful discharge:issued ice pack       OBJECTIVE DATA SUMMARY:   HISTORY:   Past Medical History:   Diagnosis Date    Age-related osteoporosis without current pathological fracture 7/11/2019    Cataract 12/11/2017    Chronic kidney insufficiency 12/11/2017    Depression 12/11/2017    Dyslipidemia 12/11/2017    Fatigue 12/11/2017    GERD (gastroesophageal reflux disease)     Glaucoma 12/11/2017    Gout 12/11/2017    Hypertension     Hypertension, benign 12/11/2017    IGT (impaired glucose tolerance) 1/9/2019    On statin therapy 12/11/2017    Peripheral arterial disease ([facility] [address] 1/17/2020    Status post stent right SFA    Pre-ulcerative corn or callous 12/11/2017    Right foot injury 12/11/2017    Statin intolerance 2/25/2022     Past Surgical History:   Procedure Laterality Date    COLONOSCOPY N/A 1/6/2017    COLON[facility] performed by [facility] Castillo MD at [facility] ENDOSCOPY    HX HEENT      eye surgery    [place] COLON CA SCRN NOT HI RSK IND  1/6/2017            Prior Level of Function/Environment/Context: :Lives alone and is fully independent, including medication management and IADL performance. She does have a h/o memory issues and becomes disoriented in unfamiliar environments, but she does well in the familiar environment of her home. Her daughter reports that she lives down the street from her. Expanded or extensive additional review of patient history:   Home Situation  Support Systems: Child(latoya)  Patient Expects to be Discharged to[de-identified] Home with home health    Hand dominance: Right    EXAMINATION OF PERFORMANCE DEFICITS:  Cognitive/Behavioral Status:  Neurologic State: Alert  Orientation Level: Oriented X4  Cognition: Follows commands;Decreased attention/concentration;Memory loss        Safety/Judgement: Decreased awareness of need for safety      Vision/Perceptual:    Acuity: Within Defined Limits    Corrective Lenses: Glasses    Range of Motion:  AROM: Within functional limits    Strength:  Strength: Within functional limits  Coordination:  Coordination: Within functional limits  Fine Motor Skills-Upper: Left Intact; Right Intact    Gross Motor Skills-Upper: Left Intact; Right Intact    Tone & Sensation:  Tone: Normal       Balance:  Sitting: Intact  Standing: Intact  Standing - Static: Constant support; Fair  Standing - Dynamic : Constant support; Fair    Functional Mobility and Transfers for ADLs:  Bed Mobility:  Supine to Sit: Modified independent  Sit to Supine: Modified independent  Scooting: Modified independent    Transfers:  Sit to Stand: Independent  Stand to Sit: Independent  Bed to Chair: Independent  Bathroom Mobility: Independent  Toilet Transfer : Independent    ADL Assessment:  Feeding: Independent    Oral Facial Hygiene/Grooming: Independent    Bathing: Independent    Upper Body Dressing: Independent    Lower Body Dressing: Independent    Toileting: Independent                IADL:  Performed bed making independently. Functional Measure:  Barthel Index:    Bathin  Bladder: 10  Bowels: 10  Groomin  Dressing: 10  Feeding: 10  Mobility: 10  Stairs: 5  Toilet Use: 10  Transfer (Bed to Chair and Back): 15  Total: 90/100        The Barthel ADL Index: Guidelines  1. The index should be used as a record of what a patient does, not as a record of what a patient could do. 2. The main aim is to establish degree of independence from any help, physical or verbal, however minor and for whatever reason. 3. The need for supervision renders the patient not independent. 4. A patient's performance should be established using the best available evidence. Asking the patient, friends/relatives and nurses are the usual sources, but direct observation and common sense are also important. However direct testing is not needed. 5. Usually the patient's performance over the preceding 24-48 hours is important, but occasionally longer periods will be relevant. 6. Middle categories imply that the patient supplies over 50 per cent of the effort. 7. Use of aids to be independent is allowed. Sanjuanita Harris, Barthel, D.W. (1584). Functional evaluation: the Barthel Index. [address] [facility] (14)2.   Cayden Rich willy SAAD Pedro, Kathaleen Boas., Marisela Brandon., Stephanie Campbell. (1999). Measuring the change indisability after inpatient rehabilitation; comparison of the responsiveness of the Barthel Index and Functional [place] Measure. Journal of Neurology, Neurosurgery, and Psychiatry, 66(4), 048-122. KRISTEN Rubio, JACKI Bernstein, & Yissel Bourne M.A. (2004.) Assessment of post-stroke quality of life in cost-effectiveness studies: The usefulness of the Barthel Index and the EuroQoL-5D. Quality of Life Research, 13, 427-43      Pain Rating:  No complaint of pain    Activity Tolerance:   Good  Please refer to the flowsheet for vital signs taken during this treatment. After treatment patient left in no apparent distress:    Sitting in chair, Call bell within reach, Bed / chair alarm activated and Caregiver / family present    COMMUNICATION/EDUCATION:   The patients plan of care was discussed with: Registered Nurse and .     Thank you for this referral.  Raul Pineda, OTR/L  Time Calculation: 28 mins

## 2022-06-27 NOTE — PROGRESS NOTES
Problem: Falls - Risk of  Goal: *Absence of Falls  Description: Document Floyd Perez Fall Risk and appropriate interventions in the flowsheet. Outcome: Progressing Towards Goal  Note: Fall Risk Interventions:  Mobility Interventions: Bed/chair exit alarm,Communicate number of staff needed for ambulation/transfer,Patient to call before getting OOB,Utilize walker, cane, or other assistive device    Mentation Interventions: Bed/chair exit alarm,Gait belt with transfers/ambulation,Increase mobility,More frequent rounding    Medication Interventions: Bed/chair exit alarm,Evaluate medications/consider consulting pharmacy,Patient to call before getting OOB,Teach patient to arise slowly    Elimination Interventions: Bed/chair exit alarm,Call light in reach,Patient to call for help with toileting needs,Toileting schedule/hourly rounds    History of Falls Interventions: Bed/chair exit alarm,Consult care management for discharge planning,Evaluate medications/consider consulting pharmacy,Utilize gait belt for transfer/ambulation         Problem: Patient Education: Go to Patient Education Activity  Goal: Patient/Family Education  Outcome: Progressing Towards Goal     Problem: Pressure Injury - Risk of  Goal: *Prevention of pressure injury  Description: Document Mike Scale and appropriate interventions in the flowsheet.   Outcome: Progressing Towards Goal  Note: Pressure Injury Interventions:  Sensory Interventions: Assess changes in LOC,Check visual cues for pain,Float heels,Keep linens dry and wrinkle-free,Maintain/enhance activity level,Minimize linen layers    Moisture Interventions: Absorbent underpads,Minimize layers    Activity Interventions: Increase time out of bed,Pressure redistribution bed/mattress(bed type),PT/OT evaluation    Mobility Interventions: HOB 30 degrees or less,PT/OT evaluation    Nutrition Interventions: Document food/fluid/supplement intake,Offer support with meals,snacks and hydration    Friction and Shear Interventions: HOB 30 degrees or less,Lift sheet,Minimize layers                Problem: Patient Education: Go to Patient Education Activity  Goal: Patient/Family Education  Outcome: Progressing Towards Goal     Problem: Patient Education: Go to Patient Education Activity  Goal: Patient/Family Education  Outcome: Progressing Towards Goal

## 2022-06-27 NOTE — PROGRESS NOTES
Problem: Falls - Risk of  Goal: *Absence of Falls  Description: Document Jovany Diaz Fall Risk and appropriate interventions in the flowsheet.   6/27/2022 1703 by Bebeto Stovall RN  Outcome: Progressing Towards Goal  Note: Fall Risk Interventions:  Mobility Interventions: Bed/chair exit alarm,Communicate number of staff needed for ambulation/transfer,Patient to call before getting OOB,Utilize walker, cane, or other assistive device    Mentation Interventions: Bed/chair exit alarm,Gait belt with transfers/ambulation,Increase mobility,More frequent rounding    Medication Interventions: Bed/chair exit alarm,Evaluate medications/consider consulting pharmacy,Patient to call before getting OOB,Teach patient to arise slowly    Elimination Interventions: Bed/chair exit alarm,Call light in reach,Patient to call for help with toileting needs,Toileting schedule/hourly rounds    History of Falls Interventions: Bed/chair exit alarm,Consult care management for discharge planning,Evaluate medications/consider consulting pharmacy,Utilize gait belt for transfer/ambulation      6/27/2022 1524 by Bebeto Stovall RN  Outcome: Progressing Towards Goal  Note: Fall Risk Interventions:  Mobility Interventions: Bed/chair exit alarm,Communicate number of staff needed for ambulation/transfer,Patient to call before getting OOB,Utilize walker, cane, or other assistive device    Mentation Interventions: Bed/chair exit alarm,Gait belt with transfers/ambulation,Increase mobility,More frequent rounding    Medication Interventions: Bed/chair exit alarm,Evaluate medications/consider consulting pharmacy,Patient to call before getting OOB,Teach patient to arise slowly    Elimination Interventions: Bed/chair exit alarm,Call light in reach,Patient to call for help with toileting needs,Toileting schedule/hourly rounds    History of Falls Interventions: Bed/chair exit alarm,Consult care management for discharge planning,Evaluate medications/consider consulting pharmacy,Utilize gait belt for transfer/ambulation         Problem: Patient Education: Go to Patient Education Activity  Goal: Patient/Family Education  6/27/2022 1703 by Jackson Johnson RN  Outcome: Progressing Towards Goal  6/27/2022 1524 by Jackson Johnson RN  Outcome: Progressing Towards Goal     Problem: Pressure Injury - Risk of  Goal: *Prevention of pressure injury  Description: Document Mike Scale and appropriate interventions in the flowsheet.   6/27/2022 1703 by Jackson Johnson RN  Outcome: Progressing Towards Goal  Note: Pressure Injury Interventions:  Sensory Interventions: Assess changes in LOC,Check visual cues for pain,Float heels,Keep linens dry and wrinkle-free,Maintain/enhance activity level,Minimize linen layers    Moisture Interventions: Absorbent underpads,Minimize layers    Activity Interventions: Increase time out of bed,Pressure redistribution bed/mattress(bed type),PT/OT evaluation    Mobility Interventions: HOB 30 degrees or less,PT/OT evaluation    Nutrition Interventions: Document food/fluid/supplement intake,Offer support with meals,snacks and hydration    Friction and Shear Interventions: HOB 30 degrees or less,Lift sheet,Minimize layers             6/27/2022 1524 by Jackson Jhonson RN  Outcome: Progressing Towards Goal  Note: Pressure Injury Interventions:  Sensory Interventions: Assess changes in LOC,Check visual cues for pain,Float heels,Keep linens dry and wrinkle-free,Maintain/enhance activity level,Minimize linen layers    Moisture Interventions: Absorbent underpads,Minimize layers    Activity Interventions: Increase time out of bed,Pressure redistribution bed/mattress(bed type),PT/OT evaluation    Mobility Interventions: HOB 30 degrees or less,PT/OT evaluation    Nutrition Interventions: Document food/fluid/supplement intake,Offer support with meals,snacks and hydration    Friction and Shear Interventions: HOB 30 degrees or less,Lift sheet,Minimize layers Problem: Patient Education: Go to Patient Education Activity  Goal: Patient/Family Education  6/27/2022 1703 by Aislinn Hill RN  Outcome: Progressing Towards Goal  6/27/2022 1524 by Aislinn Hill RN  Outcome: Progressing Towards Goal     Problem: Patient Education: Go to Patient Education Activity  Goal: Patient/Family Education  6/27/2022 1703 by Aislinn Hill RN  Outcome: Progressing Towards Goal  6/27/2022 1524 by Aislinn Hill RN  Outcome: Progressing Towards Goal

## 2022-06-28 ENCOUNTER — PATIENT OUTREACH (OUTPATIENT)
Dept: CASE MANAGEMENT | Age: 77
End: 2022-06-28

## 2022-06-28 LAB
ATRIAL RATE: 80 BPM
CALCULATED P AXIS, ECG09: 40 DEGREES
CALCULATED R AXIS, ECG10: 1 DEGREES
CALCULATED T AXIS, ECG11: 0 DEGREES
DIAGNOSIS, 93000: NORMAL
P-R INTERVAL, ECG05: 132 MS
Q-T INTERVAL, ECG07: 420 MS
QRS DURATION, ECG06: 68 MS
QTC CALCULATION (BEZET), ECG08: 484 MS
VENTRICULAR RATE, ECG03: 80 BPM

## 2022-06-28 NOTE — PROGRESS NOTES
Care Transitions Initial Call    Call within 2 business days of discharge: Yes     Patient: Yuliana Hernandez Patient : 1945 MRN: [id number]    Last Discharge [facility]       Complaint Diagnosis Description Type Department Provider    22 Knee Pain Inability to ambulate due to knee . .. ED to Hosp-Admission (Discharged) (ADMIT) Jarod Arriaga MD; William Mckeon. .. Was this an external facility discharge? No     Challenges to be reviewed by the provider   Additional needs identified to be addressed with provider: no       Method of communication with provider : none    Discussed COVID-19 related testing which was not done at this time. Advance Care Planning:   Does patient have an Advance Directive: patient declined education. Inpatient Readmission Risk score: Unplanned Readmit Risk Score: 10.9 ( )    Was this a readmission? no   Patient stated reason for the admission: knee pain    Patients top risk factors for readmission: medical condition-uncontrolled pain and medication management   Interventions to address risk factors: Obtained and reviewed discharge summary and/or continuity of care documents and Assessment and support for treatment adherence and medication management-tramadol    Care Transition Nurse (CTN) contacted the patient by telephone to perform post hospital discharge assessment. Verified name and  with patient as identifiers. Provided introduction to self, and explanation of the CTN role. CTN reviewed discharge instructions, medical action plan and red flags with patient who verbalized understanding. Were discharge instructions available to patient? yes. Reviewed appropriate site of care based on symptoms and resources available to patient including: PCP, Specialist, Home Health and Condition related references. Patient given an opportunity to ask questions and does not have any further questions or concerns at this time.  The patient agrees to contact the PCP office for questions related to their healthcare. Medication reconciliation was performed with patient, who verbalizes understanding of administration of home medications. Advised obtaining a 90-day supply of all daily and as-needed medications. Referral to Pharm D needed: no     Home Health/Outpatient orders at discharge: home health care  [address]: At [address]  Date of initial visit: TBD    [facility] ordered at discharge: None    Was patient discharged with a pulse oximeter? no    Discussed follow-up appointments. If no appointment was previously scheduled, appointment scheduling offered: patient wants to schedule own appt. Is follow up appointment scheduled within 7 days of discharge? no.   [facility] follow up appointment(s):   Future Appointments   Date Time Provider Job Annalise   8/25/2022 10:30 AM Talia Owen MD [facility]       Plan for follow-up call in 5-7 days based on severity of symptoms and risk factors. Plan for next call: symptom management-knee pain, follow up appointment-pcp, ortho details and medication management-tramadol  CTN provided contact information for future needs. Goals Addressed                 This Visit's Progress     Prevent complications post hospitalization. 6/28/22   Activity- activity intolerance/energy conservation/frequent rest, HH PT to increase mobility and prevent falls.  Medication compliance   Attend [facility] appt   Using teach back, reviewed red flags. Patient will monitor/report red flags- fever/chills, difficulty breathing, low blood pressure, fast heart rate, and mental confusion, N/V-dehydration.  DEBORAH

## 2022-06-30 ENCOUNTER — OFFICE VISIT (OUTPATIENT)
Dept: INTERNAL MEDICINE CLINIC | Age: 77
End: 2022-06-30
Payer: MEDICARE

## 2022-06-30 VITALS
WEIGHT: 127.8 LBS | DIASTOLIC BLOOD PRESSURE: 72 MMHG | TEMPERATURE: 98.7 F | HEIGHT: 64 IN | RESPIRATION RATE: 16 BRPM | SYSTOLIC BLOOD PRESSURE: 120 MMHG | OXYGEN SATURATION: 97 % | BODY MASS INDEX: 21.82 KG/M2 | HEART RATE: 80 BPM

## 2022-06-30 DIAGNOSIS — Z78.9 STATIN INTOLERANCE: ICD-10-CM

## 2022-06-30 DIAGNOSIS — I73.9 PERIPHERAL ARTERIAL DISEASE (HCC): ICD-10-CM

## 2022-06-30 DIAGNOSIS — R41.3 MEMORY LOSS: ICD-10-CM

## 2022-06-30 DIAGNOSIS — I10 HYPERTENSION, BENIGN: ICD-10-CM

## 2022-06-30 DIAGNOSIS — N18.31 STAGE 3A CHRONIC KIDNEY DISEASE (HCC): ICD-10-CM

## 2022-06-30 DIAGNOSIS — Z76.89 ENCOUNTER FOR SUPPORT AND COORDINATION OF TRANSITION OF CARE: Primary | ICD-10-CM

## 2022-06-30 PROBLEM — N18.30 CHRONIC RENAL DISEASE, STAGE III (HCC): Status: ACTIVE | Noted: 2022-06-30

## 2022-06-30 LAB
APPEARANCE UR: CLEAR
BACTERIA URNS QL MICRO: NEGATIVE /HPF
BILIRUB UR QL: NEGATIVE
COLOR UR: ABNORMAL
COMMENT, HOLDF: NORMAL
EPITH CASTS URNS QL MICRO: ABNORMAL /LPF
GLUCOSE UR STRIP.AUTO-MCNC: NEGATIVE MG/DL
HGB UR QL STRIP: NEGATIVE
KETONES UR QL STRIP.AUTO: NEGATIVE MG/DL
LEUKOCYTE ESTERASE UR QL STRIP.AUTO: ABNORMAL
NITRITE UR QL STRIP.AUTO: NEGATIVE
PH UR STRIP: 6 [PH] (ref 5–8)
PROT UR STRIP-MCNC: NEGATIVE MG/DL
RBC #/AREA URNS HPF: ABNORMAL /HPF (ref 0–5)
SAMPLES BEING HELD,HOLD: NORMAL
SP GR UR REFRACTOMETRY: 1.02 (ref 1–1.03)
TSH SERPL DL<=0.05 MIU/L-ACNC: 1.33 UIU/ML (ref 0.36–3.74)
UROBILINOGEN UR QL STRIP.AUTO: 0.2 EU/DL (ref 0.2–1)
VIT B12 SERPL-MCNC: 812 PG/ML (ref 193–986)
WBC URNS QL MICRO: ABNORMAL /HPF (ref 0–4)

## 2022-06-30 PROCEDURE — G8427 DOCREV CUR MEDS BY ELIG CLIN: HCPCS | Performed by: INTERNAL MEDICINE

## 2022-06-30 PROCEDURE — 99496 TRANSJ CARE MGMT HIGH F2F 7D: CPT | Performed by: INTERNAL MEDICINE

## 2022-06-30 NOTE — PROGRESS NOTES
Neeru Rodrigues is a 64 y.o. female and presents with Transitions Of Care  . Subjective:  Mrs. Marcie Platt presents today for transition of care follow-up after being hospitalized at [facility] from 6/26-6/27. She seen a fall which resulted in a large hematoma of her left lower extremity without fracture. However during evaluation her blood pressure apparently dropped but she also had significant memory changes and was kept overnight. She had a head CT scan that did not show any acute abnormalities. Her labs were essentially normal.  She did not have a urinalysis. She does have a history of hypertension and peripheral vascular disease. She remains on aspirin daily for this. Her daughter notes that she has had issues with her memory for at least several months now. It has become much worse. She has difficulty recalling the names of objects or remembering where things are located. As long as she is in familiar surroundings or maintains a routine she does fairly well. She is able to pay bills and maintain ADLs to a large extent.     Past Medical History:   Diagnosis Date    Age-related osteoporosis without current pathological fracture 7/11/2019    Cataract 12/11/2017    Chronic kidney insufficiency 12/11/2017    Depression 12/11/2017    Dyslipidemia 12/11/2017    Fatigue 12/11/2017    GERD (gastroesophageal reflux disease)     Glaucoma 12/11/2017    Gout 12/11/2017    Hypertension     Hypertension, benign 12/11/2017    IGT (impaired glucose tolerance) 1/9/2019    On statin therapy 12/11/2017    Peripheral arterial disease ([address] 1/17/2020    Status post stent right SFA    Pre-ulcerative corn or callous 12/11/2017    Right foot injury 12/11/2017    Statin intolerance 2/25/2022     Past Surgical History:   Procedure Laterality Date    COLONOSCOPY N/A 1/6/2017    COLONOSCOPY performed by Letitia Vviar., MD at [facility] ENDOSCOPY    HX HEENT      eye surgery    AZ COLON CA SCRN NOT HI RSK IND  1/6/2017          Allergies   Allergen Reactions    Diclofenac Unknown (comments)     Patient reports she is not allergic     Current Outpatient Medications   Medication Sig Dispense Refill    traMADoL (Ultram) 50 mg tablet Take 1 Tablet by mouth every six (6) hours as needed for Pain for up to 3 days. Max Daily Amount: 200 mg. 12 Tablet 0    docosahexaenoic acid/epa (FISH OIL PO) Take 1 Capsule by mouth two (2) times a day.  atenoloL (TENORMIN) 50 mg tablet TAKE 1 TABLET BY MOUTH TWICE DAILY 180 Tablet 3    aspirin delayed-release 81 mg tablet Take  by mouth daily.  ascorbic acid, vitamin C, (VITAMIN C) 250 mg tablet Take 250 mg by mouth daily.  CALCIUM CARBONATE/VITAMIN D3 (CALCIUM+D PO) Take 1 Tab by mouth two (2) times a day. Social History     Socioeconomic History    Marital status:    Tobacco Use    Smoking status: Former Smoker    Smokeless tobacco: Never Used   Vaping Use    Vaping Use: Never used   Substance and Sexual Activity    Alcohol use: Yes     Alcohol/week: 1.0 standard drink     Types: 1 Glasses of wine per week    Drug use: No     History reviewed. No pertinent family history. Review of Systems  Constitutional:  negative for fevers, chills, anorexia and weight loss  Eyes:    negative for visual disturbance and irritation  ENT:    negative for tinnitus,sore throat,nasal congestion,ear pains. hoarseness  Respiratory:     negative for cough, hemoptysis, dyspnea,wheezing  CV:    negative for chest pain, palpitations, lower extremity edema  GI:    negative for nausea, vomiting, diarrhea, abdominal pain,melena  Endo:               negative for polyuria,polydipsia,polyphagia,heat intolerance  Genitourinary : negative for frequency, dysuria and hematuria  Integumentary: negative for rash and pruritus  Hematologic:   negative for easy bruising and gum/nose bleeding  Musculoskel:  negative for myalgias,  back pain, muscle weakness  Neurological: negative for headaches, dizziness, vertigo,and gait   Behavl/Psych:  negative for feelings of anxiety, depression, mood changes  ROS otherwise negative      Objective:  Visit Vitals  /72 (BP 1 Location: Left upper arm, BP Patient Position: Sitting, BP Cuff Size: Adult)   Pulse 80   Temp 98.7 °F (37.1 °C) (Oral)   Resp 16   Ht 5' 3.5\" (1.613 m)   Wt 127 lb 12.8 oz (58 kg)   SpO2 97%   BMI 22.28 kg/m²     Physical Exam:   General appearance - alert, well appearing, and in no distress  Mental status - alert, oriented to person, place, and time  EYE-TATYANA, EOMI, fundi normal, corneas normal, no foreign bodies  ENT-ENT exam normal, no neck nodes or sinus tenderness  Nose - normal and patent, no erythema, discharge or polyps  Mouth - mucous membranes moist, pharynx normal without lesions  Neck - supple, no significant adenopathy   Chest - clear to auscultation, no wheezes, rales or rhonchi, symmetric air entry   Heart - normal rate, regular rhythm, normal S1, S2, no murmurs, rubs, clicks or gallops   Abdomen - soft, nontender, nondistended, no masses or organomegaly  Lymph- no adenopathy palpable  Ext-peripheral pulses normal, no pedal edema, no clubbing or cyanosis, left knee with large hematoma and significant bruising beneath the skin of the thigh and lower extremity with swelling of the left lower extremity  Skin-Warm and dry. no hyperpigmentation, vitiligo, or suspicious lesions  Neuro -alert, oriented to person and place, normal speech, no focal findings or movement disorder noted, able to answer questions and follow simple commands, clock drawing test with some difficulty      Assessment/Plan:  Diagnoses and all orders for this visit:    1. Encounter for support and coordination of transition of care    2. Memory loss  -     TSH 3RD GENERATION; Future  -     VITAMIN B12; Future  -     REFERRAL TO NEUROLOGY    3. Hypertension, benign  -     URINALYSIS W/ RFLX MICROSCOPIC; Future    4.  Peripheral arterial disease ([facility]    5. Statin intolerance    6. Stage 3a chronic kidney disease ([facility]          ICD-10-CM ICD-9-CM    1. Encounter for support and coordination of transition of care  Z76.89 V65.8    2. Memory loss  R41.3 780.93 TSH 3RD GENERATION      VITAMIN B12      VITAMIN B12      TSH 3RD GENERATION      REFERRAL TO NEUROLOGY   3. Hypertension, benign  I10 401.1 URINALYSIS W/ RFLX MICROSCOPIC   4. Peripheral arterial disease (HCC)  I73.9 443.9    5. Statin intolerance  Z78.9 995.27    6. Stage 3a chronic kidney disease (HCC)  N18.31 585. 3        Plan:    The patient does have some significant memory loss. She has longstanding history of hypertension and peripheral arterial disease. Interestingly her CT scan did not show any evidence of microvascular disease. She will have follow-up labs including urine analysis, TSH, and B12 to rule out a metabolic abnormality contributing to her symptoms. Her blood pressure and heart rate otherwise appear to be normal at this time. The swelling of her left lower extremity should subside. She will keep her leg elevated when not ambulatory. She will weight-bear as tolerates. She will be referred to neurology for evaluation first available provider and first available location. Cecea Favre She will follow-up as planned August 29. Follow-up and Dispositions    · Return for as scheduled. I have reviewed with the patient details of the assessment and plan and all questions were answered. Relevent patient education was performed. Verbal and/or written instructions (see AVS) provided. The most recent lab findings were reviewed with the patient. Plan was discussed with patient who verbally expressed understanding. An After Visit Summary was printed and given to the patient.     Narinder Sage MD

## 2022-06-30 NOTE — PROGRESS NOTES
Yuliana Hernandez is a 64 y.o. female presenting for Transitions Of Care  . 1. Have you been to the ER, urgent care clinic since your last visit? Hospitalized since your last visit? Admit date: 6/26/2022  Discharge date and time: 6/27/2022        DISCHARGE DIAGNOSIS:        Left knee pain s/p ground-level mechanical fall    2. Have you seen or consulted any other health care providers outside of the [address] [place] [address] since your last visit? Include any pap smears or colon screening. No    Fall Risk Assessment, last 12 mths 2/2/2021   Able to walk? Yes   Fall in past 12 months? 0   Do you feel unsteady? 0   Are you worried about falling 0   Number of falls in past 12 months -   Fall with injury? -         Abuse Screening Questionnaire 1/17/2020   Do you ever feel afraid of your partner? N   Are you in a relationship with someone who physically or mentally threatens you? N   Is it safe for you to go home? Y       3 most recent PHQ Screens 2/25/2022   Little interest or pleasure in doing things Not at all   Feeling down, depressed, irritable, or hopeless Not at all   Total Score PHQ 2 0   Trouble falling or staying asleep, or sleeping too much -   Feeling tired or having little energy -   Poor appetite, weight loss, or overeating -   Feeling bad about yourself - or that you are a failure or have let yourself or your family down -   Trouble concentrating on things such as school, work, reading, or watching TV -   Moving or speaking so slowly that other people could have noticed; or the opposite being so fidgety that others notice -   Thoughts of being better off dead, or hurting yourself in some way -   PHQ 9 Score -       There are no discontinued medications.

## 2022-07-13 ENCOUNTER — PATIENT OUTREACH (OUTPATIENT)
Dept: CASE MANAGEMENT | Age: 77
End: 2022-07-13

## 2022-07-15 ENCOUNTER — TELEPHONE (OUTPATIENT)
Dept: INTERNAL MEDICINE CLINIC | Age: 77
End: 2022-07-15

## 2022-07-18 ENCOUNTER — OFFICE VISIT (OUTPATIENT)
Dept: INTERNAL MEDICINE CLINIC | Age: 77
End: 2022-07-18
Payer: MEDICARE

## 2022-07-18 VITALS
TEMPERATURE: 98.9 F | OXYGEN SATURATION: 98 % | BODY MASS INDEX: 21.37 KG/M2 | HEIGHT: 64 IN | WEIGHT: 125.2 LBS | SYSTOLIC BLOOD PRESSURE: 128 MMHG | HEART RATE: 79 BPM | RESPIRATION RATE: 18 BRPM | DIASTOLIC BLOOD PRESSURE: 78 MMHG

## 2022-07-18 DIAGNOSIS — M25.562 LEFT KNEE PAIN, UNSPECIFIED CHRONICITY: Primary | ICD-10-CM

## 2022-07-18 PROCEDURE — 1101F PT FALLS ASSESS-DOCD LE1/YR: CPT | Performed by: INTERNAL MEDICINE

## 2022-07-18 PROCEDURE — 1123F ACP DISCUSS/DSCN MKR DOCD: CPT | Performed by: INTERNAL MEDICINE

## 2022-07-18 PROCEDURE — G8427 DOCREV CUR MEDS BY ELIG CLIN: HCPCS | Performed by: INTERNAL MEDICINE

## 2022-07-18 PROCEDURE — 1111F DSCHRG MED/CURRENT MED MERGE: CPT | Performed by: INTERNAL MEDICINE

## 2022-07-18 PROCEDURE — G8432 DEP SCR NOT DOC, RNG: HCPCS | Performed by: INTERNAL MEDICINE

## 2022-07-18 PROCEDURE — G8752 SYS BP LESS 140: HCPCS | Performed by: INTERNAL MEDICINE

## 2022-07-18 PROCEDURE — G8754 DIAS BP LESS 90: HCPCS | Performed by: INTERNAL MEDICINE

## 2022-07-18 PROCEDURE — 99213 OFFICE O/P EST LOW 20 MIN: CPT | Performed by: INTERNAL MEDICINE

## 2022-07-18 PROCEDURE — G8536 NO DOC ELDER MAL SCRN: HCPCS | Performed by: INTERNAL MEDICINE

## 2022-07-18 PROCEDURE — 1090F PRES/ABSN URINE INCON ASSESS: CPT | Performed by: INTERNAL MEDICINE

## 2022-07-18 PROCEDURE — G8420 CALC BMI NORM PARAMETERS: HCPCS | Performed by: INTERNAL MEDICINE

## 2022-07-18 NOTE — PROGRESS NOTES
Shelia Robb is a 64 y.o. female presenting for Knee Swelling (L)  . 1. Have you been to the ER, urgent care clinic since your last visit? Hospitalized since your last visit? No    2. Have you seen or consulted any other health care providers outside of the [address] [place] [address] since your last visit? Include any pap smears or colon screening. No    Fall Risk Assessment, last 12 mths 2/2/2021   Able to walk? Yes   Fall in past 12 months? 0   Do you feel unsteady? 0   Are you worried about falling 0   Number of falls in past 12 months -   Fall with injury? -         Abuse Screening Questionnaire 1/17/2020   Do you ever feel afraid of your partner? N   Are you in a relationship with someone who physically or mentally threatens you? N   Is it safe for you to go home? Y       3 most recent PHQ Screens 2/25/2022   Little interest or pleasure in doing things Not at all   Feeling down, depressed, irritable, or hopeless Not at all   Total Score PHQ 2 0   Trouble falling or staying asleep, or sleeping too much -   Feeling tired or having little energy -   Poor appetite, weight loss, or overeating -   Feeling bad about yourself - or that you are a failure or have let yourself or your family down -   Trouble concentrating on things such as school, work, reading, or watching TV -   Moving or speaking so slowly that other people could have noticed; or the opposite being so fidgety that others notice -   Thoughts of being better off dead, or hurting yourself in some way -   PHQ 9 Score -       There are no discontinued medications.

## 2022-07-18 NOTE — PROGRESS NOTES
Ling Vo is a 64 y.o. female and presents with Knee Swelling (L)  . Subjective:  Mrs. Michael Ruelas presents today with continued swelling of the left knee and leg. She has minimal pain associated with this that is positional.  She sustained a fall previously which was well-documented per her previous office note. She has had no fever. She has swelling involving the entirety of her left lower extremity. Past Medical History:   Diagnosis Date    Age-related osteoporosis without current pathological fracture 7/11/2019    Cataract 12/11/2017    Chronic kidney insufficiency 12/11/2017    Depression 12/11/2017    Dyslipidemia 12/11/2017    Fatigue 12/11/2017    GERD (gastroesophageal reflux disease)     Glaucoma 12/11/2017    Gout 12/11/2017    Hypertension     Hypertension, benign 12/11/2017    IGT (impaired glucose tolerance) 1/9/2019    On statin therapy 12/11/2017    Peripheral arterial disease ([facility] [address] 1/17/2020    Status post stent right SFA    Pre-ulcerative corn or callous 12/11/2017    Right foot injury 12/11/2017    Statin intolerance 2/25/2022     Past Surgical History:   Procedure Laterality Date    COLONOSCOPY N/A 1/6/2017    COLONOSCOPY performed by Kiko Ceballos MD at [facility] ENDOSCOPY    HX HEENT      eye surgery    CO COLON CA SCRN NOT HI RSK IND  1/6/2017          Allergies   Allergen Reactions    Diclofenac Unknown (comments)     Patient reports she is not allergic     Current Outpatient Medications   Medication Sig Dispense Refill    docosahexaenoic acid/epa (FISH OIL PO) Take 1 Capsule by mouth two (2) times a day.  atenoloL (TENORMIN) 50 mg tablet TAKE 1 TABLET BY MOUTH TWICE DAILY 180 Tablet 3    aspirin delayed-release 81 mg tablet Take  by mouth daily.  ascorbic acid, vitamin C, (VITAMIN C) 250 mg tablet Take 250 mg by mouth daily.  CALCIUM CARBONATE/VITAMIN D3 (CALCIUM+D PO) Take 1 Tab by mouth two (2) times a day.        Social History     Socioeconomic History    Marital status:    Tobacco Use    Smoking status: Former Smoker    Smokeless tobacco: Never Used   Vaping Use    Vaping Use: Never used   Substance and Sexual Activity    Alcohol use: Yes     Alcohol/week: 1.0 standard drink     Types: 1 Glasses of wine per week    Drug use: No     History reviewed. No pertinent family history. Review of Systems  Constitutional:  negative for fevers, chills, anorexia and weight loss  Eyes:    negative for visual disturbance and irritation  ENT:    negative for tinnitus,sore throat,nasal congestion,ear pains. hoarseness  Respiratory:     negative for cough, hemoptysis, dyspnea,wheezing  CV:    negative for chest pain, palpitations, lower extremity edema  GI:    negative for nausea, vomiting, diarrhea, abdominal pain,melena  Endo:               negative for polyuria,polydipsia,polyphagia,heat intolerance  Genitourinary : negative for frequency, dysuria and hematuria  Integumentary: negative for rash and pruritus  Hematologic:   negative for easy bruising and gum/nose bleeding  Musculoskel:  negative for myalgias, arthralgias, back pain, muscle weakness  Neurological:   negative for headaches, dizziness, vertigo, memory problems and gait   Behavl/Psych:  negative for feelings of anxiety, depression, mood changes  ROS otherwise negative      Objective:  Visit Vitals  /78 (BP 1 Location: Left upper arm, BP Patient Position: Sitting, BP Cuff Size: Adult)   Pulse 79   Temp 98.9 °F (37.2 °C) (Oral)   Resp 18   Ht 5' 3.5\" (1.613 m)   Wt 125 lb 3.2 oz (56.8 kg)   SpO2 98%   BMI 21.83 kg/m²     Physical Exam:   General appearance - alert, well appearing, and in no distress  Mental status - alert, oriented to person, place, and time  EYE-TATYANA, EOMI, fundi normal, corneas normal, no foreign bodies  ENT-ENT exam normal, no neck nodes or sinus tenderness  Nose - normal and patent, no erythema, discharge or polyps  Mouth - mucous membranes moist, pharynx normal without lesions  Neck - supple, no significant adenopathy   Chest - clear to auscultation, no wheezes, rales or rhonchi, symmetric air entry   Heart - normal rate, regular rhythm, normal S1, S2, no murmurs, rubs, clicks or gallops   Abdomen - soft, nontender, nondistended, no masses or organomegaly  Lymph- no adenopathy palpable  Ext-left knee significant swelling with effusion involving not only the knee but the entire medial aspect of the left thigh without any significant ecchymosis and no warmth or calor or erythema noted  Skin-Warm and dry. no hyperpigmentation, vitiligo, or suspicious lesions  Neuro -alert, oriented, normal speech, no focal findings or movement disorder noted      Assessment/Plan:  Diagnoses and all orders for this visit:    1. Left knee pain, unspecified chronicity  -     XR KNEE LT 3 V          ICD-10-CM ICD-9-CM    1. Left knee pain, unspecified chronicity  M25.562 719.46 XR KNEE LT 3 V     Plan:    After informed consent and sterile prep a 19-gauge needle was introduced into the subcutaneous space of the left knee and thigh and attempted aspiration with minimal drainage. However after removing the needle a significant amount of fluid slowly drained from the site and saturating approximately an entire pack of 4 x 4 gauze over several minutes time. This eventually subsided and stopped. The wound was dressed and secured with Coban wrap. The patient was given additional 4 x 4 gauze to use at home if her dressing became saturated. The drainage was serosanguineous and she was reassured this will likely continue to improve on its own. I do not think further attempts at aspiration would be beneficial.  She will elevate her leg when not ambulatory and may use alternating heat and ice to help resolve the swelling. Follow-up and Dispositions    · Return for as scheduled.          I have reviewed with the patient details of the assessment and plan and all questions were answered. Relevent patient education was performed. Verbal and/or written instructions (see AVS) provided. The most recent lab findings were reviewed with the patient. Plan was discussed with patient who verbally expressed understanding. An After Visit Summary was printed and given to the patient.     Jeremiah Otero MD

## 2022-07-27 ENCOUNTER — PATIENT OUTREACH (OUTPATIENT)
Dept: CASE MANAGEMENT | Age: 77
End: 2022-07-27

## 2022-07-27 NOTE — PROGRESS NOTES
Patient has graduated from the Transitions of Care Coordination  program on 7/27/22. Patient/family has the ability to self-manage at this time Care management goals have been completed. Patient was not referred to the [facility] team for further management. Goals Addressed                   This Visit's Progress     COMPLETED: Prevent complications post hospitalization. 6/28/22  Activity- activity intolerance/energy conservation/frequent rest,  PT to increase mobility and prevent falls. Medication compliance  Attend CRUZ appt  Using teach back, reviewed red flags. Patient will monitor/report red flags- fever/chills, difficulty breathing, low blood pressure, fast heart rate, and mental confusion, N/V-dehydration. [facility]  7/13/22 patient reports participating with [facility] and attending CRUZ appt. Denies   7/27/22 Per review of chart patient attended follow up appointments. Patient has had no ED or hospital admissions 30 days post discharge. Goal complete. [facility]               Patient has Care Transition Nurse's contact information for any further questions, concerns, or needs.   Patients upcoming visits:    Future Appointments   Date Time Provider Job Woody   8/25/2022 10:30 AM Terrall Opitz, MD [facility] BS AMB   10/6/2022  8:00 AM Michelle Craig MD NEU BS AMB   1/24/2023  2:00 PM MD RADHA AcostaM BS AMB

## 2022-08-09 NOTE — PROGRESS NOTES
Neurology Note    Patient ID:  Alea Fuentes  [id number]  64 y.o.  1945      Date of Consultation:  August 10, 2022    Referring Physician: Dr. Ayala Torres    Reason for Consultation:  memory concerns      Assessment and Plan:    Patient is a pleasant 49-year-old female who presents to the neurology clinic with progressive cognitive decline. Her examination does reveal a Everett cognitive assessment score of 18 out of 30. Progressive cognitive decline: This is  consistent with a dementia, probable Alzheimer's type. She did have neuroimaging and serology performed looking for reversible causes. I discussed this at length with the patient and her daughter today. I would like to start her on Aricept 5 mg a day. This dose can be increased at her next visit. Side effects and risks were discussed with the patient. Continue speech therapy and home exercise    I discussed with the patient and family members in regards to the patient's cognitive limitations and need to ensure patient safety. Attempts to minimize opportunities of harm is important. Activities to be monitored, or avoided, would include cooking, ironing clothes, financial bill payments, and driving. Having structure to a day is important for the patient. Cognitive and physical activities should be considered daily. We talked about this at length today. I did answer all of the questions that the patient and her daughter had today. Subjective: my memory       History of Present Illness:   Alea Fuentes is a 64 y.o. female who was referred to the neurology clinic at [facility] for an evaluation of memory concerns. The patient presents with her daughter who provides additional information and history taking. Daughter states that she has noticed some difficulties with cognition for at least the past 3 years. May be slightly longer. They noticed that she was becoming more forgetful.   She would forget common words and recipes. She was using more generic words and her daughter recognize that she was feeling in the blanks for her mother more often. When things became much more apparent is after an event in late June. They were out miniature golfing when the patient did fall and she also was admitted to the hospital where significant swelling of her leg had occurred. She was kept in the hospital overnight and while in the hospital she could not remember the names of multiple family members. This became very concerning for them. It was after this that they set up a follow-up with her primary care doctor and then this visit was arranged. She did have a head CT performed on June 26, 2022. I was able to independently review this. There was no acute abnormalities. She also did have vitamin B12 and a thyroid level checked and those were normal.  After the hospitalization. She was getting physical therapy, Occupational Therapy and speech therapy. The physical and Occupational Therapy have now stopped but she is continuing to get speech therapy. She is living at home and currently family is helping with getting her around. Prior to her hospitalization she was driving. She does all of her own finances and her daughter has not noticed any difficulty. The patient does recognize that she has had some difficulty with remembering certain words. The daughter has been called recently by community group members and [employer] members that they have noticed her cognitive decline over the past few months and you.     Past Medical History:   Diagnosis Date    Age-related osteoporosis without current pathological fracture 7/11/2019    Cataract 12/11/2017    Chronic kidney insufficiency 12/11/2017    Depression 12/11/2017    Dyslipidemia 12/11/2017    Fatigue 12/11/2017    GERD (gastroesophageal reflux disease)     Glaucoma 12/11/2017    Gout 12/11/2017    Hypertension     Hypertension, benign 12/11/2017    IGT (impaired glucose tolerance) 1/9/2019    On statin therapy 12/11/2017    Peripheral arterial disease ([address] 1/17/2020    Status post stent right SFA    Pre-ulcerative corn or callous 12/11/2017    Right foot injury 12/11/2017    Statin intolerance 2/25/2022        Past Surgical History:   Procedure Laterality Date    COLONOSCOPY N/A 1/6/2017    COLONOSCOPY performed by Garfield Ruiz MD at [facility] ENDOSCOPY    HX HEENT      eye surgery    ND COLON CA SCRN NOT HI RSK IND  1/6/2017             No family history of dementia or neurodegenerative disease. Social History     Tobacco Use    Smoking status: Former    Smokeless tobacco: Never   Substance Use Topics    Alcohol use: Yes     Alcohol/week: 1.0 standard drink     Types: 1 Glasses of wine per week        Allergies   Allergen Reactions    Diclofenac Unknown (comments)     Patient reports she is not allergic        Prior to Admission medications    Medication Sig Start Date End Date Taking? Authorizing Provider   docosahexaenoic acid/epa (FISH OIL PO) Take 1 Capsule by mouth two (2) times a day. Yes Other, MD Fredy   atenoloL (TENORMIN) 50 mg tablet TAKE 1 TABLET BY MOUTH TWICE DAILY 10/12/21  Yes Jonah Sinha MD   aspirin delayed-release 81 mg tablet Take  by mouth daily. Yes Provider, Historical   ascorbic acid, vitamin C, (VITAMIN C) 250 mg tablet Take 250 mg by mouth daily. Yes Provider, Historical   CALCIUM CARBONATE/VITAMIN D3 (CALCIUM+D PO) Take 1 Tab by mouth two (2) times a day.    Yes Provider, Historical       Review of Systems:    General, constitutional: Falls  Eyes, vision: Cataracts  Ears, nose, throat: negative  Cardiovascular, heart: negative  Respiratory: negative  Gastrointestinal: negative  Genitourinary: negative  Musculoskeletal: negative  Skin and integumentary: negative  Psychiatric: negative  Endocrine: negative  Neurological: negative, except for HPI  Hematologic/lymphatic: negative  Allergy/immunology: negative        Objective:     Visit Vitals  BP (!) 160/88 (BP 1 Location: Left arm, BP Patient Position: Sitting, BP Cuff Size: Small adult)   Pulse 74   Resp 16   Ht 5' 3.5\" (1.613 m)   Wt 124 lb (56.2 kg)   SpO2 96%   BMI 21.62 kg/m²       Physical Exam:      General:  appears well nourished in no acute distress  Neck: no carotid bruits  Lungs: clear to auscultation  Heart:  no murmurs, regular rate  Lower extremity: peripheral pulses palpable and no edema  Skin: intact    Neurological exam:    Patient is awake and alert. She is oriented to person, place, year. She has difficulty with both recent and remote memory. She has significant word finding difficulties. There is decreased attention and concentration. Her Andrew cognitive assessment score was an 18 out of 30 today. Cranial nerves:   II-XII were tested    Perrrla  Fundoscopic examination revealed venous pulsations and no clear abnormalities  Visual fields were full  Eomi, no evidence of nystagmus  Facial sensation:  normal and symmetric  Facial motor: normal and symmetric  Hearing intact  SCM strength intact  Tongue: midline without fasciculations    Motor: Tone normal  Pronator drift was absent  No evidence of fasciculations    Strength testing:   deltoid triceps biceps Wrist ext. Wrist flex. intrinsics Hip flex. Hip ext. Knee ext. Knee flex Dorsi flex Plantar flex   Right 5 5 5 5 5 5 5 5 5 5 5 5   Left 5 5 5 5 5 5 5 5 5 5 5 5         Sensory:  Upper extremity: intact to pp  Lower extremity: intact to pp    Reflexes:    Right Left  Biceps  2 2  Triceps             2 2  Brachiorad. 2 2  Patella  2 2  Achilles 2 2    Cerebellar testing:  no tremor apparent, finger/nose and adrienne were intact    Romberg: absent. Mild swaying    Gait: steady. Slow.     Labs:     Lab Results   Component Value Date/Time    Hemoglobin A1c 5.7 (H) 02/25/2022 11:20 AM    Sodium 140 06/27/2022 04:45 AM    Potassium 4.3 06/27/2022 04:45 AM    Chloride 111 (H) 06/27/2022 04:45 AM    Glucose 152 (H) 06/27/2022 04:45 AM    BUN 25 (H) 06/27/2022 04:45 AM    Creatinine 1.14 (H) 06/27/2022 04:45 AM    Calcium 8.7 06/27/2022 04:45 AM    WBC 15.3 (H) 06/27/2022 04:45 AM    HCT 33.6 (L) 06/27/2022 04:45 AM    HGB 10.5 (L) 06/27/2022 04:45 AM    PLATELET 319 28/07/3577 04:45 AM       Imaging:    Results from Hospital Encounter encounter on 09/21/11    MRI ANKLE LEFT WITHOUT CONTRAST    Narrative  **Final Report**      ICD Codes / Adm. Diagnosis: 719.47   / PAIN IN JOINT, ANKLE AND FOOT  Examination:  MR ANKLE MRI WO CON LT  - [id number] - Sep 21 2011  5:36PM  Accession No:  [id number]  Reason:  ankle pain/effusion      REPORT:  INDICATION: Top of left foot and ankle and a single toe numbness next the  toe. Recurring ankle effusion    EXAM: MRI left ankle without contrast.    Comparisons: None    Sequences include sagittal T1, sagittal, coronal, and axial T2 fat  saturation, axial proton density with fat saturation. Sagittal  fat-suppressed spoiled gradient echo images. Findings: Examination of the marrow demonstrates subtle nonspecific likely  reactive marrow change within anterolateral process of the calcaneus. No  active erosions are noted. Alignment of the ankle is normal. The talar dome  is intact. There are no discrete cartilage defect. No evidence of coalition. The plantar fascia is not thickened or inflamed. There is significant  tenosynovitis of the extensor digitorum longus tendons. There is fluid  loculated in the posterior ankle recess and there is a subtalar effusion  with fluid extending into Kager's fat pad. There is edema in Kager's fat pad  and along the arch of the foot extending into the sinus tarsi. There is a  longitudinal split tear of the peroneus brevis just proximal to its  insertion. Remaining ankle tendons are intact. Ankle ligaments are intact. .  No discrete mass lesions. IMPRESSION:  1. Extensive tenosynovitis of the extensor digitorum longus tendons.   2. Small ankle and subtalar fusions with edema throughout the soft tissues  of the foot extending into the sinus tarsi. This combination of findings is  nonspecific. While no active erosions are identified consider inflammatory  arthropathy  3. A small longitudinal split tear of the peroneus brevis just proximal to  its insertion        Signing/Reading Doctor: Elina Payne ([id number])  Approved: Elina Payne ([id number])  09/22/2011      Results from Hospital Encounter encounter on 06/26/22    CT LOW EXT LT WO CONT    Narrative  EXAM: CT LOW EXT LT WO CONT    INDICATION: Left leg pain, prepatellar soft tissue swelling on x-ray. COMPARISON: Left hip, femur, and knee views on 6/26/2022. TECHNIQUE: Helical CT of the left lower extremity from the left mid iliac bone  to the navicular with coronal and sagittal reformats. Images reviewed in soft  tissue and bone windows. CT dose reduction was achieved through the use of a  standardized protocol tailored for this examination and automatic exposure  control for dose modulation. CONTRAST: None. FINDINGS: Bones: Mild osteopenia. No acute fracture. No suspicious bone lesion. Joint fluid: None. Articulations: Minimal left hip osteoarthritis. Mild left knee arthritis and  meniscal/capsular chondrocalcinosis. No erosion. Tendons: Extensor mechanism is intact. Left hamstring origin calcific  tendinosis. Ankle tendons are grossly intact. Muscles: Mild diffuse atrophy. Edema superficial to the distal vastus medialis  muscle. No gas. Soft tissue mass: No mass. Soft tissue swelling. Hyperattenuating fluid in the  subcutaneous adipose tissues medial to the knee and vastus medialis muscles  measures 10.8 x 2.9 x 32 cm. Vascular stent is in the femoral artery. Colonic  diverticulosis is partially imaged. Impression  1.  Large presumed hematoma in the subcutaneous adipose tissues medial in the  distal thigh and medial to the knee measures 32 cm in craniocaudal extent. Consider posttraumatic hemorrhage versus spontaneous hemorrhage in the setting  of blood thinners. 2. No fracture. 3. Left knee arthritis and chondrocalcinosis. Differential diagnosis includes  chronic osteoarthritis versus CPPD arthropathy. Patient Active Problem List   Diagnosis Code    Glaucoma H40.9    Dyslipidemia E78.5    Hypertension, benign I10    Cataract H26.9    Right foot injury S99.921A    IGT (impaired glucose tolerance) R73.02    Age-related osteoporosis without current pathological fracture M81.0    Peripheral arterial disease (HCC) I73.9    Statin intolerance Z78.9    Hypotension I95.9    Left knee pain M25.562    Left knee injury S89. 92XA    Ambulatory dysfunction R26.2    Chronic renal disease, stage III N18.30        The patient should return to clinic in 3-4 months    Renewed medication; yes    I spent   65 minutes on the day of the encounter preparing the office visit by reviewing medical records, obtaining a history, performing examination, counseling and educating the patient and family members on diagnosis, ordering medications, documenting in the clinical medical record, and coordinating the care for the patient. The patient had the ability to ask questions and all questions were answered.                Signed By:  Dionne Yee DO FAAN    August 10, 2022

## 2022-08-10 ENCOUNTER — OFFICE VISIT (OUTPATIENT)
Dept: NEUROLOGY | Age: 77
End: 2022-08-10
Payer: MEDICARE

## 2022-08-10 VITALS
SYSTOLIC BLOOD PRESSURE: 160 MMHG | DIASTOLIC BLOOD PRESSURE: 88 MMHG | BODY MASS INDEX: 21.17 KG/M2 | HEIGHT: 64 IN | RESPIRATION RATE: 16 BRPM | WEIGHT: 124 LBS | HEART RATE: 74 BPM | OXYGEN SATURATION: 96 %

## 2022-08-10 DIAGNOSIS — G30.9 ALZHEIMER'S DISEASE, UNSPECIFIED (CODE) (HCC): ICD-10-CM

## 2022-08-10 PROCEDURE — 1101F PT FALLS ASSESS-DOCD LE1/YR: CPT | Performed by: PSYCHIATRY & NEUROLOGY

## 2022-08-10 PROCEDURE — 99205 OFFICE O/P NEW HI 60 MIN: CPT | Performed by: PSYCHIATRY & NEUROLOGY

## 2022-08-10 PROCEDURE — 1090F PRES/ABSN URINE INCON ASSESS: CPT | Performed by: PSYCHIATRY & NEUROLOGY

## 2022-08-10 PROCEDURE — G8510 SCR DEP NEG, NO PLAN REQD: HCPCS | Performed by: PSYCHIATRY & NEUROLOGY

## 2022-08-10 PROCEDURE — 1123F ACP DISCUSS/DSCN MKR DOCD: CPT | Performed by: PSYCHIATRY & NEUROLOGY

## 2022-08-10 PROCEDURE — G8753 SYS BP > OR = 140: HCPCS | Performed by: PSYCHIATRY & NEUROLOGY

## 2022-08-10 PROCEDURE — G8536 NO DOC ELDER MAL SCRN: HCPCS | Performed by: PSYCHIATRY & NEUROLOGY

## 2022-08-10 PROCEDURE — G8754 DIAS BP LESS 90: HCPCS | Performed by: PSYCHIATRY & NEUROLOGY

## 2022-08-10 PROCEDURE — G8427 DOCREV CUR MEDS BY ELIG CLIN: HCPCS | Performed by: PSYCHIATRY & NEUROLOGY

## 2022-08-10 PROCEDURE — G8420 CALC BMI NORM PARAMETERS: HCPCS | Performed by: PSYCHIATRY & NEUROLOGY

## 2022-08-10 RX ORDER — DONEPEZIL HYDROCHLORIDE 5 MG/1
5 TABLET, FILM COATED ORAL
Qty: 30 TABLET | Refills: 5 | Status: SHIPPED | OUTPATIENT
Start: 2022-08-10

## 2022-08-10 NOTE — Clinical Note
8/10/2022    Patient: Michael Ceron   YOB: 1945   Date of Visit: 8/10/2022     Nadine Bosworth, MD  Via     Dear Nadine Bosworth, MD,      Thank you for referring Ms. Rut Alanis to [address] [place] [address] for evaluation. My notes for this consultation are attached. If you have questions, please do not hesitate to call me. I look forward to following your patient along with you.       Sincerely,    James Qiu, DO

## 2022-08-25 ENCOUNTER — OFFICE VISIT (OUTPATIENT)
Dept: INTERNAL MEDICINE CLINIC | Age: 77
End: 2022-08-25
Payer: MEDICARE

## 2022-08-25 VITALS
BODY MASS INDEX: 20.69 KG/M2 | TEMPERATURE: 98.1 F | HEIGHT: 64 IN | SYSTOLIC BLOOD PRESSURE: 120 MMHG | DIASTOLIC BLOOD PRESSURE: 76 MMHG | OXYGEN SATURATION: 97 % | RESPIRATION RATE: 18 BRPM | HEART RATE: 69 BPM | WEIGHT: 121.2 LBS

## 2022-08-25 DIAGNOSIS — G30.9 ALZHEIMER'S DISEASE, UNSPECIFIED (CODE) (HCC): ICD-10-CM

## 2022-08-25 DIAGNOSIS — E78.5 DYSLIPIDEMIA: ICD-10-CM

## 2022-08-25 DIAGNOSIS — R41.3 MEMORY LOSS: ICD-10-CM

## 2022-08-25 DIAGNOSIS — R73.02 IGT (IMPAIRED GLUCOSE TOLERANCE): ICD-10-CM

## 2022-08-25 DIAGNOSIS — M81.0 AGE-RELATED OSTEOPOROSIS WITHOUT CURRENT PATHOLOGICAL FRACTURE: ICD-10-CM

## 2022-08-25 DIAGNOSIS — R53.83 FATIGUE, UNSPECIFIED TYPE: ICD-10-CM

## 2022-08-25 DIAGNOSIS — Z78.9 STATIN INTOLERANCE: ICD-10-CM

## 2022-08-25 DIAGNOSIS — N18.31 STAGE 3A CHRONIC KIDNEY DISEASE (HCC): ICD-10-CM

## 2022-08-25 DIAGNOSIS — I10 HYPERTENSION, BENIGN: Primary | ICD-10-CM

## 2022-08-25 PROBLEM — I95.9 HYPOTENSION: Status: RESOLVED | Noted: 2022-06-26 | Resolved: 2022-08-25

## 2022-08-25 LAB
ALBUMIN SERPL-MCNC: 4.1 G/DL (ref 3.5–5)
ALBUMIN/GLOB SERPL: 1.2 {RATIO} (ref 1.1–2.2)
ALP SERPL-CCNC: 88 U/L (ref 45–117)
ALT SERPL-CCNC: 24 U/L (ref 12–78)
ANION GAP SERPL CALC-SCNC: 8 MMOL/L (ref 5–15)
APPEARANCE UR: CLEAR
AST SERPL-CCNC: 33 U/L (ref 15–37)
BACTERIA URNS QL MICRO: NEGATIVE /HPF
BASOPHILS # BLD: 0.1 K/UL (ref 0–0.1)
BASOPHILS NFR BLD: 1 % (ref 0–1)
BILIRUB SERPL-MCNC: 0.6 MG/DL (ref 0.2–1)
BILIRUB UR QL: NEGATIVE
BUN SERPL-MCNC: 25 MG/DL (ref 6–20)
BUN/CREAT SERPL: 28 (ref 12–20)
CALCIUM SERPL-MCNC: 9.9 MG/DL (ref 8.5–10.1)
CHLORIDE SERPL-SCNC: 107 MMOL/L (ref 97–108)
CHOLEST SERPL-MCNC: 222 MG/DL
CO2 SERPL-SCNC: 26 MMOL/L (ref 21–32)
COLOR UR: ABNORMAL
CREAT SERPL-MCNC: 0.88 MG/DL (ref 0.55–1.02)
DIFFERENTIAL METHOD BLD: ABNORMAL
EOSINOPHIL # BLD: 0.3 K/UL (ref 0–0.4)
EOSINOPHIL NFR BLD: 3 % (ref 0–7)
EPITH CASTS URNS QL MICRO: ABNORMAL /LPF
ERYTHROCYTE [DISTWIDTH] IN BLOOD BY AUTOMATED COUNT: 13.4 % (ref 11.5–14.5)
EST. AVERAGE GLUCOSE BLD GHB EST-MCNC: 108 MG/DL
GLOBULIN SER CALC-MCNC: 3.4 G/DL (ref 2–4)
GLUCOSE SERPL-MCNC: 97 MG/DL (ref 65–100)
GLUCOSE UR STRIP.AUTO-MCNC: NEGATIVE MG/DL
HBA1C MFR BLD: 5.4 % (ref 4–5.6)
HCT VFR BLD AUTO: 48.2 % (ref 35–47)
HDLC SERPL-MCNC: 39 MG/DL
HDLC SERPL: 5.7 {RATIO} (ref 0–5)
HGB BLD-MCNC: 15 G/DL (ref 11.5–16)
HGB UR QL STRIP: NEGATIVE
HYALINE CASTS URNS QL MICRO: ABNORMAL /LPF (ref 0–5)
IMM GRANULOCYTES # BLD AUTO: 0 K/UL (ref 0–0.04)
IMM GRANULOCYTES NFR BLD AUTO: 0 % (ref 0–0.5)
KETONES UR QL STRIP.AUTO: NEGATIVE MG/DL
LDLC SERPL CALC-MCNC: 145.8 MG/DL (ref 0–100)
LEUKOCYTE ESTERASE UR QL STRIP.AUTO: ABNORMAL
LYMPHOCYTES # BLD: 3.6 K/UL (ref 0.8–3.5)
LYMPHOCYTES NFR BLD: 39 % (ref 12–49)
MCH RBC QN AUTO: 26.4 PG (ref 26–34)
MCHC RBC AUTO-ENTMCNC: 31.1 G/DL (ref 30–36.5)
MCV RBC AUTO: 84.7 FL (ref 80–99)
MONOCYTES # BLD: 0.7 K/UL (ref 0–1)
MONOCYTES NFR BLD: 8 % (ref 5–13)
NEUTS SEG # BLD: 4.5 K/UL (ref 1.8–8)
NEUTS SEG NFR BLD: 49 % (ref 32–75)
NITRITE UR QL STRIP.AUTO: NEGATIVE
NRBC # BLD: 0 K/UL (ref 0–0.01)
NRBC BLD-RTO: 0 PER 100 WBC
PH UR STRIP: 7 [PH] (ref 5–8)
PLATELET # BLD AUTO: 278 K/UL (ref 150–400)
PMV BLD AUTO: 11.5 FL (ref 8.9–12.9)
POTASSIUM SERPL-SCNC: 3.7 MMOL/L (ref 3.5–5.1)
PROT SERPL-MCNC: 7.5 G/DL (ref 6.4–8.2)
PROT UR STRIP-MCNC: ABNORMAL MG/DL
RBC # BLD AUTO: 5.69 M/UL (ref 3.8–5.2)
RBC #/AREA URNS HPF: ABNORMAL /HPF (ref 0–5)
SODIUM SERPL-SCNC: 141 MMOL/L (ref 136–145)
SP GR UR REFRACTOMETRY: 1.02 (ref 1–1.03)
TRIGL SERPL-MCNC: 186 MG/DL (ref ?–150)
UROBILINOGEN UR QL STRIP.AUTO: 0.2 EU/DL (ref 0.2–1)
VLDLC SERPL CALC-MCNC: 37.2 MG/DL
WBC # BLD AUTO: 9.3 K/UL (ref 3.6–11)
WBC URNS QL MICRO: ABNORMAL /HPF (ref 0–4)

## 2022-08-25 PROCEDURE — G8432 DEP SCR NOT DOC, RNG: HCPCS | Performed by: INTERNAL MEDICINE

## 2022-08-25 PROCEDURE — G8420 CALC BMI NORM PARAMETERS: HCPCS | Performed by: INTERNAL MEDICINE

## 2022-08-25 PROCEDURE — 1090F PRES/ABSN URINE INCON ASSESS: CPT | Performed by: INTERNAL MEDICINE

## 2022-08-25 PROCEDURE — G8752 SYS BP LESS 140: HCPCS | Performed by: INTERNAL MEDICINE

## 2022-08-25 PROCEDURE — 99214 OFFICE O/P EST MOD 30 MIN: CPT | Performed by: INTERNAL MEDICINE

## 2022-08-25 PROCEDURE — 1123F ACP DISCUSS/DSCN MKR DOCD: CPT | Performed by: INTERNAL MEDICINE

## 2022-08-25 PROCEDURE — G8427 DOCREV CUR MEDS BY ELIG CLIN: HCPCS | Performed by: INTERNAL MEDICINE

## 2022-08-25 PROCEDURE — G8754 DIAS BP LESS 90: HCPCS | Performed by: INTERNAL MEDICINE

## 2022-08-25 PROCEDURE — 1101F PT FALLS ASSESS-DOCD LE1/YR: CPT | Performed by: INTERNAL MEDICINE

## 2022-08-25 PROCEDURE — G8536 NO DOC ELDER MAL SCRN: HCPCS | Performed by: INTERNAL MEDICINE

## 2022-08-25 RX ORDER — GLUCOSAMINE SULFATE 1500 MG
POWDER IN PACKET (EA) ORAL DAILY
COMMUNITY

## 2022-08-25 NOTE — PROGRESS NOTES
Clay Salas is a 64 y.o. female presenting for Follow Up Chronic Condition (6 mo fu)  . 1. Have you been to the ER, urgent care clinic since your last visit? Hospitalized since your last visit? No    2. Have you seen or consulted any other health care providers outside of the [address] [place] [address] since your last visit? Include any pap smears or colon screening. No    Fall Risk Assessment, last 12 mths 8/10/2022   Able to walk? Yes   Fall in past 12 months? 1   Do you feel unsteady? 1   Are you worried about falling 1   Is TUG test greater than 12 seconds? 0   Is the gait abnormal? 1   Number of falls in past 12 months -   Fall with injury? -         Abuse Screening Questionnaire 1/17/2020   Do you ever feel afraid of your partner? N   Are you in a relationship with someone who physically or mentally threatens you? N   Is it safe for you to go home? Y       3 most recent PHQ Screens 8/10/2022   Little interest or pleasure in doing things Not at all   Feeling down, depressed, irritable, or hopeless Not at all   Total Score PHQ 2 0   Trouble falling or staying asleep, or sleeping too much -   Feeling tired or having little energy -   Poor appetite, weight loss, or overeating -   Feeling bad about yourself - or that you are a failure or have let yourself or your family down -   Trouble concentrating on things such as school, work, reading, or watching TV -   Moving or speaking so slowly that other people could have noticed; or the opposite being so fidgety that others notice -   Thoughts of being better off dead, or hurting yourself in some way -   PHQ 9 Score -       There are no discontinued medications.

## 2022-08-25 NOTE — LETTER
8/25/2022 11:12 AM    Ms[address]      Dear Mrs. Ceballos    Because of your diagnosis of early stage dementia I recommend you only drive to familiar locations nearby. You should also have your phone on at all times so that your family can get in touch with you if needed.           Sincerely,      Carito Smith MD

## 2022-08-25 NOTE — PROGRESS NOTES
Alea Fuentes is a 64 y.o. female and presents with Follow Up Chronic Condition (6 mo fu)  . Subjective:  Alea Fuentes presents today for follow-up of hypertension, impaired glucose tolerance, mild renal insufficiency, history of hyperlipidemia with intolerance of statin therapy, and more recently Alzheimer's dementia. She has sustained a fall with a large hematoma involving her left leg and was seen in the emergency room at that time. She had been noted to have increasing confusion and difficulty with remembering things. Head CT was unremarkable and labs were stable. She has had follow-up with neurology and she has been started on low-dose donepezil for Alzheimer's dementia. She presents today accompanied by her daughter. Her daughter notes that she appears to be stable overall but is still confused at times.     Past Medical History:   Diagnosis Date    Age-related osteoporosis without current pathological fracture 7/11/2019    Cataract 12/11/2017    Chronic kidney insufficiency 12/11/2017    Depression 12/11/2017    Dyslipidemia 12/11/2017    Fatigue 12/11/2017    GERD (gastroesophageal reflux disease)     Glaucoma 12/11/2017    Gout 12/11/2017    Hypertension     Hypertension, benign 12/11/2017    IGT (impaired glucose tolerance) 1/9/2019    On statin therapy 12/11/2017    Peripheral arterial disease ([address] 1/17/2020    Status post stent right SFA    Pre-ulcerative corn or callous 12/11/2017    Right foot injury 12/11/2017    Statin intolerance 2/25/2022     Past Surgical History:   Procedure Laterality Date    COLONOSCOPY N/A 1/6/2017    COLONOSCOPY performed by Unknown MD Damaris at [facility] ENDOSCOPY    HX HEENT      eye surgery    [place] COLON CA SCRN NOT HI RSK IND  1/6/2017          Allergies   Allergen Reactions    Diclofenac Unknown (comments)     Patient reports she is not allergic     Current Outpatient Medications   Medication Sig Dispense Refill    VITAMIN A PO Take  by mouth daily.      cholecalciferol (Vitamin D3) 25 mcg (1,000 unit) cap Take  by mouth daily. donepeziL (ARICEPT) 5 mg tablet Take 1 Tablet by mouth nightly. 30 Tablet 5    atenoloL (TENORMIN) 50 mg tablet TAKE 1 TABLET BY MOUTH TWICE DAILY (Patient taking differently: Take 50 mg by mouth two (2) times a day.) 180 Tablet 3    aspirin delayed-release 81 mg tablet Take  by mouth daily. ascorbic acid, vitamin C, (VITAMIN C) 250 mg tablet Take 250 mg by mouth daily. CALCIUM CARBONATE/VITAMIN D3 (CALCIUM+D PO) Take 1 Tab by mouth two (2) times a day. Social History     Socioeconomic History    Marital status:    Tobacco Use    Smoking status: Former    Smokeless tobacco: Never   Vaping Use    Vaping Use: Never used   Substance and Sexual Activity    Alcohol use: Yes     Alcohol/week: 1.0 standard drink     Types: 1 Glasses of wine per week    Drug use: No    Sexual activity: Not Currently     History reviewed. No pertinent family history. Review of Systems  Constitutional:  negative for fevers, chills, anorexia and weight loss  Eyes:    negative for visual disturbance and irritation  ENT:    negative for tinnitus,sore throat,nasal congestion,ear pains. hoarseness  Respiratory:     negative for cough, hemoptysis, dyspnea,wheezing  CV:    negative for chest pain, palpitations, lower extremity edema  GI:    negative for nausea, vomiting, diarrhea, abdominal pain,melena  Endo:               negative for polyuria,polydipsia,polyphagia,heat intolerance  Genitourinary : negative for frequency, dysuria and hematuria  Integumentary: negative for rash and pruritus  Hematologic:   negative for easy bruising and gum/nose bleeding  Musculoskel:  negative for myalgias, arthralgias, back pain, muscle weakness, joint pain  Neurological:   negative for headaches, dizziness, vertigo, memory problems and gait   Behavl/Psych:  negative for feelings of anxiety, depression, mood changes  ROS otherwise negative      Objective:  Visit Vitals  /76 (BP 1 Location: Left upper arm, BP Patient Position: Sitting, BP Cuff Size: Adult)   Pulse 69   Temp 98.1 °F (36.7 °C) (Oral)   Resp 18   Ht 5' 3.5\" (1.613 m)   Wt 121 lb 3.2 oz (55 kg)   SpO2 97%   BMI 21.13 kg/m²     Physical Exam:   General appearance - alert, well appearing, and in no distress  Mental status - alert, oriented to person, place, and time  EYE-TATYANA, EOMI, fundi normal, corneas normal, no foreign bodies  ENT-ENT exam normal, no neck nodes or sinus tenderness  Nose - normal and patent, no erythema, discharge or polyps  Mouth - mucous membranes moist, pharynx normal without lesions  Neck - supple, no significant adenopathy   Chest - clear to auscultation, no wheezes, rales or rhonchi, symmetric air entry   Heart - normal rate, regular rhythm, normal S1, S2, no murmurs, rubs, clicks or gallops   Abdomen - soft, nontender, nondistended, no masses or organomegaly  Lymph- no adenopathy palpable  Ext-peripheral pulses diminished no pedal edema, no clubbing or cyanosis, mild rubor right leg, mild edema left lower extremity  Skin-Warm and dry. no hyperpigmentation, vitiligo, or suspicious lesions  Neuro -alert, oriented, normal speech, no focal findings or movement disorder noted      Assessment/Plan:  Diagnoses and all orders for this visit:    1. Hypertension, benign  -     METABOLIC PANEL, COMPREHENSIVE; Future  -     URINALYSIS W/ RFLX MICROSCOPIC; Future    2. IGT (impaired glucose tolerance)  -     HEMOGLOBIN A1C WITH EAG; Future    3. Alzheimer's disease, unspecified    4. Age-related osteoporosis without current pathological fracture    5. Stage 3a chronic kidney disease (HCC)  -     CBC WITH AUTOMATED DIFF; Future  -     METABOLIC PANEL, COMPREHENSIVE; Future    6. Dyslipidemia  -     LIPID PANEL; Future    7. Statin intolerance    8. Memory loss    9. Fatigue, unspecified type  -     CBC WITH AUTOMATED DIFF; Future          ICD-10-CM ICD-9-CM    1. Hypertension, benign  A86 399.9 METABOLIC PANEL, COMPREHENSIVE      URINALYSIS W/ RFLX MICROSCOPIC      URINALYSIS W/ RFLX MICROSCOPIC      METABOLIC PANEL, COMPREHENSIVE      2. IGT (impaired glucose tolerance)  R73.02 790.22 HEMOGLOBIN A1C WITH EAG      HEMOGLOBIN A1C WITH EAG      3. Alzheimer's disease, unspecified  G30.9 331.0       4. Age-related osteoporosis without current pathological fracture  M81.0 733.01       5. Stage 3a chronic kidney disease (HCC)  N18.31 585.3 CBC WITH AUTOMATED DIFF      METABOLIC PANEL, COMPREHENSIVE      METABOLIC PANEL, COMPREHENSIVE      CBC WITH AUTOMATED DIFF      6. Dyslipidemia  E78.5 272.4 LIPID PANEL      LIPID PANEL      7. Statin intolerance  Z78.9 995.27       8. Memory loss  R41.3 780.93       9. Fatigue, unspecified type  R53.83 780.79 CBC WITH AUTOMATED DIFF      CBC WITH AUTOMATED DIFF        Plan:    Continue current medical regimen as outlined above. Further recommendations based on labs as ordered. The patient continues to have follow-up with vascular surgery status post previous stenting of the lower extremity and she will have follow-up with neurology as planned in November. Follow-up and Dispositions    Return in about 3 months (around 11/25/2022) for follow up. I have reviewed with the patient details of the assessment and plan and all questions were answered. Relevent patient education was performed. Verbal and/or written instructions (see AVS) provided. The most recent lab findings were reviewed with the patient. Plan was discussed with patient who verbally expressed understanding. An After Visit Summary was printed and given to the patient.     Indigo Duggan MD

## 2022-09-22 RX ORDER — ATENOLOL 50 MG/1
TABLET ORAL
Qty: 180 TABLET | Refills: 3 | Status: SHIPPED | OUTPATIENT
Start: 2022-09-22

## 2022-11-10 ENCOUNTER — VIRTUAL VISIT (OUTPATIENT)
Dept: NEUROLOGY | Age: 77
End: 2022-11-10
Payer: MEDICARE

## 2022-11-10 DIAGNOSIS — G30.9 ALZHEIMER'S DISEASE, UNSPECIFIED (CODE) (HCC): Primary | ICD-10-CM

## 2022-11-10 DIAGNOSIS — R41.3 MEMORY LOSS: ICD-10-CM

## 2022-11-10 DIAGNOSIS — R47.89 WORD FINDING DIFFICULTY: ICD-10-CM

## 2022-11-10 PROCEDURE — 99213 OFFICE O/P EST LOW 20 MIN: CPT | Performed by: NURSE PRACTITIONER

## 2022-11-10 PROCEDURE — 1123F ACP DISCUSS/DSCN MKR DOCD: CPT | Performed by: NURSE PRACTITIONER

## 2022-11-10 RX ORDER — DONEPEZIL HYDROCHLORIDE 10 MG/1
10 TABLET, FILM COATED ORAL
Qty: 30 TABLET | Refills: 5 | Status: SHIPPED | OUTPATIENT
Start: 2022-11-10

## 2022-11-10 NOTE — PROGRESS NOTES
Chief Complaint   Patient presents with    Follow-up     Alzheimer's disease, unspecified       1. Have you been to the ER, urgent care clinic since your last visit? NO  Hospitalized since your last visit? No     2. Have you seen or consulted any other health care providers outside of the [address] [place] [address] since your last visit? No Include any pap smears or colon screening. NO    Patient daughter providing information she has outbursts at times when angry or upset.

## 2022-11-10 NOTE — PROGRESS NOTES
[facility]  [address]  [address]  Tel: [phone]  Fax: [phone]      Date:  11/10/22     Name:  Katerine Ward  :  1945  MRN:  [id number]     PCP:  Monroe Tobias MD    Chief Complaint   Patient presents with    Follow-up     Alzheimer's disease, unspecified       This is a telemedicine visit that was performed with in the originating site at patient's home and the distance site at [facility] outpatient clinic at [facility].  This telemedicine visit utilized synchronous (real-time) audio-video technology. Verbal consent to participate in the video visit was obtained. This visit occurred during the corona (COVID -19) public health emergency. I discussed with the patient the nature of our telemedicine visit, that:  - I would evaluate the patient and recommend diagnostic and treatment based on my assessment  - Our sessions are not being recorded and that personal health information is protected  - Our team will provide follow-up care in person if and when the patient needs it. Consent:  The patient is aware that this patient-initiated Telehealth encounter is a billable service, with coverage as determined by the patient's insurance carrier. The patient is aware that they may receive a bill and has provided verbal consent to proceed:    HISTORY OF PRESENT ILLNESS:  Patient presents today for memory issues. She still has trouble recalling words, then gets frustrated and mad at herself and her daughter. But overall is still doing well. She lives independently, her daughter does live 6 houses down. She is doing some limiting driving, there is been no safety concerns there. She still functions at a very high level, her daughter does assist with some of her finances but otherwise patient is relatively independent. She has completed all of her sessions of speech therapy and she is good about doing the regular exercises. 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 Her daughter notes prior to taking the Aricept, it was starting to get pretty bad. But they do feel like the Aricept is really helped with her word finding issues. No notable side effects. Patient notes she had a little bit of sleep disturbance with the Aricept at first but it has not been a prolonged issue. She normally sleeps well, she denies any nightmares. There are no safety or behavior issues. Patient gets up daily, she benefits greatly from a routine, she gets stressed when she eats well puts on her make-up etc. even if she is not going anywhere. Recap from last visit 8/2022 with Dr Hargrove Adjutant: Patient is a pleasant 73-year-old female who presents to the neurology clinic with progressive cognitive decline. Her examination does reveal a [place] cognitive assessment score of 18 out of 30. Progressive cognitive decline: This is  consistent with a dementia, probable Alzheimer's type. She did have neuroimaging and serology performed looking for reversible causes. I discussed this at length with the patient and her daughter today. I would like to start her on Aricept 5 mg a day. This dose can be increased at her next visit. Side effects and risks were discussed with the patient. Continue speech therapy and home exercise     I discussed with the patient and family members in regards to the patient's cognitive limitations and need to ensure patient safety. Attempts to minimize opportunities of harm is important. Activities to be monitored, or avoided, would include cooking, ironing clothes, financial bill payments, and driving. Having structure to a day is important for the patient. Cognitive and physical activities should be considered daily. We talked about this at length today. I did answer all of the questions that the patient and her daughter had today.     REVIEW OF SYSTEMS:     Review of Systems   Musculoskeletal:  Negative for falls. Neurological:  Positive for speech change (word finding issues at times, did speech therapy). Negative for dizziness, tremors and headaches. Psychiatric/Behavioral:  Positive for memory loss (forgets words). Negative for depression and hallucinations. The patient is nervous/anxious and has insomnia (a little off at times, no nightmares). All other systems reviewed and are negative. Current Outpatient Medications   Medication Sig    donepeziL (ARICEPT) 10 mg tablet Take 1 Tablet by mouth nightly. atenoloL (TENORMIN) 50 mg tablet TAKE 1 TABLET BY MOUTH TWICE DAILY    VITAMIN A PO Take  by mouth daily. cholecalciferol (VITAMIN D3) 25 mcg (1,000 unit) cap Take  by mouth daily. aspirin delayed-release 81 mg tablet Take  by mouth daily. ascorbic acid, vitamin C, (VITAMIN C) 250 mg tablet Take 250 mg by mouth daily. CALCIUM CARBONATE/VITAMIN D3 (CALCIUM+D PO) Take 1 Tab by mouth two (2) times a day. No current facility-administered medications for this visit.      Allergies   Allergen Reactions    Diclofenac Unknown (comments)     Patient reports she is not allergic     Past Medical History:   Diagnosis Date    Age-related osteoporosis without current pathological fracture 7/11/2019    Cataract 12/11/2017    Chronic kidney insufficiency 12/11/2017    Depression 12/11/2017    Dyslipidemia 12/11/2017    Fatigue 12/11/2017    GERD (gastroesophageal reflux disease)     Glaucoma 12/11/2017    Gout 12/11/2017    Hypertension     Hypertension, benign 12/11/2017    IGT (impaired glucose tolerance) 1/9/2019    On statin therapy 12/11/2017    Peripheral arterial disease [address] [address] 1/17/2020    Status post stent right SFA    Pre-ulcerative corn or callous 12/11/2017    Right foot injury 12/11/2017    Statin intolerance 2/25/2022     Past Surgical History:   Procedure Laterality Date    COLONOSCOPY N/A 1/6/2017    COLONOSCOPY performed by Nghia Sheffield Telly Watson MD at [facility] ENDOSCOPY    HX HEENT      eye surgery    [place] COLON CA SCRN NOT HI RSK IND  1/6/2017          Social History     Socioeconomic History    Marital status:      Spouse name: Not on file    Number of children: Not on file    Years of education: Not on file    Highest education level: Not on file   Occupational History    Not on file   Tobacco Use    Smoking status: Former    Smokeless tobacco: Never   Vaping Use    Vaping Use: Never used   Substance and Sexual Activity    Alcohol use: Yes     Alcohol/week: 1.0 standard drink     Types: 1 Glasses of wine per week     Comment: not currently    Drug use: No    Sexual activity: Not Currently   Other Topics Concern    Not on file   Social History Narrative    Not on file     Social Determinants of Health     Financial Resource Strain: Not on file   Food Insecurity: Not on file   Transportation Needs: Not on file   Physical Activity: Not on file   Stress: Not on file   Social Connections: Not on file   Intimate Partner Violence: Not on file   Housing Stability: Not on file     History reviewed. No pertinent family history. PHYSICAL EXAMINATION:    There were no vitals taken for this visit. General:  Well defined, nourished, and well groomed individual in no acute distress. Neck: normal range of motion. Musculoskeletal: Visible upper extremities revealed no edema and had full range of motion of joints. Psych:  Good mood and bright affect, presents today with her daughter. NEUROLOGICAL EXAMINATION:     Mental Status:   Alert and oriented to person, place, and time with recent and remote memory intact. Attention span and concentration are normal. Clear speech. Patient had a little bit of difficulty finding her words but within a few seconds she was able to complete her train of thought. Fund of knowledge preserved. Patient was able to identify simple objects. She was able to spell world correctly backwards.     Cranial Nerves: Grossly intact. Coordination:   Finger to nose was normal.  No resting or intention tremor. Negative pronator drift. Gait and Station: Not assessed patient was sitting in a car. ASSESSMENT AND PLAN      ICD-10-CM ICD-9-CM    1. Alzheimer's disease, unspecified  G30.9 331.0 donepeziL (ARICEPT) 10 mg tablet      2. Memory loss  R41.3 780.93 donepeziL (ARICEPT) 10 mg tablet      3. Word finding difficulty  R47.89 V40.1         1. Alzheimer's disease, unspecified: At this time we will go ahead and increase her Aricept to 10 mg nightly. Safety and side effects discussed with patient and her daughter. If any major changes, to include sleep disruption, hallucinations, safety or behavior concerns, worsening depression and/or anxiety they are to notify us at which time we can modify her plan of care. Discussed the benefits of sticking to the routine, continue with different activities during the day, patient is doing some limited driving, patient's daughter notes she will contact us if there are any concerns. -     donepeziL (ARICEPT) 10 mg tablet; Take 1 Tablet by mouth nightly., Normal, Disp-30 Tablet, R-5  2. Memory loss: We will go ahead and increase patient's Aricept, will monitor her progression and modify plan of care as found appropriate.  -     donepeziL (ARICEPT) 10 mg tablet; Take 1 Tablet by mouth nightly., Normal, Disp-30 Tablet, R-5  3. Word finding difficulty: Patient has completed speech therapy, continue with home exercises. Patient and/or family verbalized understand of all instructions and all questions/concerns were addressed. Safety/side effects of medications discussed. I would like to see the patient back in approximately 3 to 4 months, sooner if needed.       Allan Cam, [facility]

## 2022-11-29 ENCOUNTER — OFFICE VISIT (OUTPATIENT)
Dept: INTERNAL MEDICINE CLINIC | Age: 77
End: 2022-11-29
Payer: MEDICARE

## 2022-11-29 VITALS
RESPIRATION RATE: 16 BRPM | WEIGHT: 121.6 LBS | HEIGHT: 64 IN | TEMPERATURE: 98.1 F | HEART RATE: 66 BPM | BODY MASS INDEX: 20.76 KG/M2 | SYSTOLIC BLOOD PRESSURE: 132 MMHG | DIASTOLIC BLOOD PRESSURE: 72 MMHG | OXYGEN SATURATION: 97 %

## 2022-11-29 DIAGNOSIS — N18.31 STAGE 3A CHRONIC KIDNEY DISEASE (HCC): ICD-10-CM

## 2022-11-29 DIAGNOSIS — M81.0 AGE-RELATED OSTEOPOROSIS WITHOUT CURRENT PATHOLOGICAL FRACTURE: ICD-10-CM

## 2022-11-29 DIAGNOSIS — I10 HYPERTENSION, BENIGN: Primary | ICD-10-CM

## 2022-11-29 DIAGNOSIS — Z71.89 ADVANCED CARE PLANNING/COUNSELING DISCUSSION: ICD-10-CM

## 2022-11-29 DIAGNOSIS — Z78.9 STATIN INTOLERANCE: ICD-10-CM

## 2022-11-29 DIAGNOSIS — G30.9 ALZHEIMER'S DISEASE, UNSPECIFIED (CODE) (HCC): ICD-10-CM

## 2022-11-29 DIAGNOSIS — R73.02 IGT (IMPAIRED GLUCOSE TOLERANCE): ICD-10-CM

## 2022-11-29 DIAGNOSIS — I73.9 PERIPHERAL ARTERIAL DISEASE (HCC): ICD-10-CM

## 2022-11-29 PROCEDURE — 99214 OFFICE O/P EST MOD 30 MIN: CPT | Performed by: INTERNAL MEDICINE

## 2022-11-29 PROCEDURE — G8432 DEP SCR NOT DOC, RNG: HCPCS | Performed by: INTERNAL MEDICINE

## 2022-11-29 PROCEDURE — G8420 CALC BMI NORM PARAMETERS: HCPCS | Performed by: INTERNAL MEDICINE

## 2022-11-29 PROCEDURE — 3074F SYST BP LT 130 MM HG: CPT | Performed by: INTERNAL MEDICINE

## 2022-11-29 PROCEDURE — G8536 NO DOC ELDER MAL SCRN: HCPCS | Performed by: INTERNAL MEDICINE

## 2022-11-29 PROCEDURE — G8754 DIAS BP LESS 90: HCPCS | Performed by: INTERNAL MEDICINE

## 2022-11-29 PROCEDURE — 1101F PT FALLS ASSESS-DOCD LE1/YR: CPT | Performed by: INTERNAL MEDICINE

## 2022-11-29 PROCEDURE — 3078F DIAST BP <80 MM HG: CPT | Performed by: INTERNAL MEDICINE

## 2022-11-29 PROCEDURE — 1123F ACP DISCUSS/DSCN MKR DOCD: CPT | Performed by: INTERNAL MEDICINE

## 2022-11-29 PROCEDURE — G8752 SYS BP LESS 140: HCPCS | Performed by: INTERNAL MEDICINE

## 2022-11-29 PROCEDURE — G8427 DOCREV CUR MEDS BY ELIG CLIN: HCPCS | Performed by: INTERNAL MEDICINE

## 2022-11-29 PROCEDURE — 1090F PRES/ABSN URINE INCON ASSESS: CPT | Performed by: INTERNAL MEDICINE

## 2022-11-29 NOTE — PROGRESS NOTES
Giorgio Ramirez is a 76 y.o. female and presents with Follow Up Chronic Condition (3 mo fu)  . Subjective:  Mrs. Nighat Brothers presents today for follow-up accompanied by her daughter with history of Alzheimer's dementia, hypertension, impaired glucose tolerance, hyperlipidemia, intolerant of statin therapy, history of peripheral arterial disease status post previous right superficial femoral artery stent, chronic renal insufficiency, and osteoporosis. She had recently followed up with neurology and her donepezil was increased to 10 mg daily. She appears to be tolerating this thus far. She reports no significant complaints today. Her daughter has noted no new issues. They recently drove to [facility] to visit her son for Thanksgiving. Past Medical History:   Diagnosis Date    Age-related osteoporosis without current pathological fracture 7/11/2019    Cataract 12/11/2017    Chronic kidney insufficiency 12/11/2017    Depression 12/11/2017    Dyslipidemia 12/11/2017    Fatigue 12/11/2017    GERD (gastroesophageal reflux disease)     Glaucoma 12/11/2017    Gout 12/11/2017    Hypertension     Hypertension, benign 12/11/2017    IGT (impaired glucose tolerance) 1/9/2019    On statin therapy 12/11/2017    Peripheral arterial disease ([address] 1/17/2020    Status post stent right SFA    Pre-ulcerative corn or callous 12/11/2017    Right foot injury 12/11/2017    Statin intolerance 2/25/2022     Past Surgical History:   Procedure Laterality Date    COLONOSCOPY N/A 1/6/2017    COLONOSCOPY performed by Pranav Britton MD at [facility] ENDOSCOPY    HX HEENT      eye surgery    [place] COLON CA SCRN NOT HI [facility] IND  1/6/2017          Allergies   Allergen Reactions    Diclofenac Unknown (comments)     Patient reports she is not allergic     Current Outpatient Medications   Medication Sig Dispense Refill    donepeziL (ARICEPT) 10 mg tablet Take 1 Tablet by mouth nightly.  30 Tablet 5    atenoloL (TENORMIN) 50 mg tablet TAKE 1 TABLET BY MOUTH TWICE DAILY 180 Tablet 3    VITAMIN A PO Take  by mouth daily. cholecalciferol (VITAMIN D3) 25 mcg (1,000 unit) cap Take  by mouth daily. aspirin delayed-release 81 mg tablet Take  by mouth daily. ascorbic acid, vitamin C, (VITAMIN C) 250 mg tablet Take 250 mg by mouth daily. CALCIUM CARBONATE/VITAMIN D3 (CALCIUM+D PO) Take 1 Tab by mouth two (2) times a day. Social History     Socioeconomic History    Marital status:    Tobacco Use    Smoking status: Former    Smokeless tobacco: Never   Vaping Use    Vaping Use: Never used   Substance and Sexual Activity    Alcohol use: Yes     Alcohol/week: 1.0 standard drink     Types: 1 Glasses of wine per week     Comment: not currently    Drug use: No    Sexual activity: Not Currently     History reviewed. No pertinent family history. Review of Systems  Constitutional:  negative for fevers, chills, anorexia and weight loss  Eyes:    negative for visual disturbance and irritation  ENT:    negative for tinnitus,sore throat,nasal congestion,ear pains. hoarseness  Respiratory:     negative for cough, hemoptysis, dyspnea,wheezing  CV:    negative for chest pain, palpitations, lower extremity edema  GI:    negative for nausea, vomiting, diarrhea, abdominal pain,melena  Endo:               negative for polyuria,polydipsia,polyphagia,heat intolerance  Genitourinary : negative for frequency, dysuria and hematuria  Integumentary: negative for rash and pruritus  Hematologic:   negative for easy bruising and gum/nose bleeding  Musculoskel:  negative for myalgias, arthralgias, back pain, muscle weakness, joint pain  Neurological:   negative for headaches, dizziness, vertigo, memory problems and gait   Behavl/Psych:  negative for feelings of anxiety, depression, mood changes  ROS otherwise negative      Objective:  Visit Vitals  /72 (BP 1 Location: Right upper arm, BP Patient Position: Sitting, BP Cuff Size: Adult)   Pulse 66   Temp 98.1 °F (36.7 °C) (Oral)   Resp 16   Ht 5' 3.5\" (1.613 m)   Wt 121 lb 9.6 oz (55.2 kg)   SpO2 97%   BMI 21.20 kg/m²     Physical Exam:   General appearance - alert, well appearing, and in no distress  Mental status - alert, oriented to person, place, and time  EYE-TATYANA, EOMI, fundi normal, corneas normal, no foreign bodies  ENT-ENT exam normal, no neck nodes or sinus tenderness  Nose - normal and patent, no erythema, discharge or polyps  Mouth - mucous membranes moist, pharynx normal without lesions  Neck - supple, no significant adenopathy   Chest - clear to auscultation, no wheezes, rales or rhonchi, symmetric air entry   Heart - normal rate, regular rhythm, normal S1, S2, no murmurs, rubs, clicks or gallops   Abdomen - soft, nontender, nondistended, no masses or organomegaly  Lymph- no adenopathy palpable  Ext-peripheral pulses normal, no pedal edema, no clubbing or cyanosis  Skin-Warm and dry. no hyperpigmentation, vitiligo, or suspicious lesions  Neuro -alert, oriented, normal speech, no focal findings or movement disorder noted      Assessment/Plan:  Diagnoses and all orders for this visit:    1. Hypertension, benign  -     REFERRAL TO [facility] CLINICAL SPECIALIST    2. Peripheral arterial disease (HCC)  -     REFERRAL TO [facility] CLINICAL SPECIALIST    3. IGT (impaired glucose tolerance)  -     REFERRAL TO [facility] CLINICAL SPECIALIST    4. Alzheimer's disease, unspecified  -     REFERRAL TO [facility] CLINICAL SPECIALIST    5. Age-related osteoporosis without current pathological fracture  -     REFERRAL TO [facility] CLINICAL SPECIALIST    6. Stage 3a chronic kidney disease (HCC)  -     REFERRAL TO [facility] CLINICAL SPECIALIST    7. Statin intolerance  -     REFERRAL TO [facility] CLINICAL SPECIALIST    8. Advanced care planning/counseling discussion  -     REFERRAL TO [facility] CLINICAL SPECIALIST          ICD-10-CM ICD-9-CM    1. Hypertension, benign  I10 401.1 REFERRAL TO [facility] CLINICAL SPECIALIST      2.  Peripheral arterial disease (HCC)  I73.9 443.9 REFERRAL TO [facility] CLINICAL SPECIALIST      3. IGT (impaired glucose tolerance)  R73.02 790.22 REFERRAL TO [facility] CLINICAL SPECIALIST      4. Alzheimer's disease, unspecified  G30.9 331.0 REFERRAL TO [facility] CLINICAL SPECIALIST      5. Age-related osteoporosis without current pathological fracture  M81.0 733.01 REFERRAL TO [facility] CLINICAL SPECIALIST      6. Stage 3a chronic kidney disease (HCC)  N18.31 585.3 REFERRAL TO [facility] CLINICAL SPECIALIST      7. Statin intolerance  Z78.9 995.27 REFERRAL TO [facility] CLINICAL SPECIALIST      8. Advanced care planning/counseling discussion  Z71.89 V65.49 REFERRAL TO [facility] CLINICAL SPECIALIST        Plan:    Continue current medical regimen as outlined above. We reviewed the patient's labs which were stable and were done approximately 3 months ago. No additional labs will be done today. We did have a short discussion regarding advance medical directives. Her daughter notes that she does have legal documents for medical power of . Her mother is still able to make some decisions with reasoning and she would like to have this further documented on the record before her dementia progresses. We will see if she is a candidate for advanced care planning clinical specialist consultation. Follow-up and Dispositions    Return in about 6 months (around 5/29/2023) for follow up. I have reviewed with the patient details of the assessment and plan and all questions were answered. Relevent patient education was performed. Verbal and/or written instructions (see AVS) provided. The most recent lab findings were reviewed with the patient. Plan was discussed with patient who verbally expressed understanding. An After Visit Summary was printed and given to the patient.     Christian Robertson MD

## 2022-11-29 NOTE — PROGRESS NOTES
Alley Ibanez is a 76 y.o. female presenting for Follow Up Chronic Condition (3 mo fu)  . 1. Have you been to the ER, urgent care clinic since your last visit? Hospitalized since your last visit? No    2. Have you seen or consulted any other health care providers outside of the [address] [place] [address] since your last visit? Include any pap smears or colon screening. No    Fall Risk Assessment, last 12 mths 11/10/2022   Able to walk? Yes   Fall in past 12 months? 0   Do you feel unsteady? 0   Are you worried about falling 0   Is TUG test greater than 12 seconds? -   Is the gait abnormal? -   Number of falls in past 12 months -   Fall with injury? -         Abuse Screening Questionnaire 1/17/2020   Do you ever feel afraid of your partner? N   Are you in a relationship with someone who physically or mentally threatens you? N   Is it safe for you to go home? Y       3 most recent PHQ Screens 11/10/2022   Little interest or pleasure in doing things Several days   Feeling down, depressed, irritable, or hopeless Several days   Total Score PHQ 2 2   Trouble falling or staying asleep, or sleeping too much -   Feeling tired or having little energy -   Poor appetite, weight loss, or overeating -   Feeling bad about yourself - or that you are a failure or have let yourself or your family down -   Trouble concentrating on things such as school, work, reading, or watching TV -   Moving or speaking so slowly that other people could have noticed; or the opposite being so fidgety that others notice -   Thoughts of being better off dead, or hurting yourself in some way -   PHQ 9 Score -       There are no discontinued medications.

## 2022-11-30 ENCOUNTER — PATIENT OUTREACH (OUTPATIENT)
Dept: CASE MANAGEMENT | Age: 77
End: 2022-11-30

## 2022-11-30 NOTE — ACP (ADVANCE CARE PLANNING)
Advance Care Planning   Ambulatory ACP Specialist Patient Outreach    Date:  11/30/2022    ACP Specialist:  Ruth Gregorio    Outreach call to patient in follow-up to ACP Specialist referral from:  Tequila Chacon MD    [x] PCP  [] Provider   [] Ambulatory Care Management [] Other     For:                  [x] Advance Directive Assistance              [] Complete Portable DNR order              [] Complete POST/MOST              [] Code Status Discussion             [] Discuss Goals of Care             [] Early ACP Decision-Making              [] Other (Specify)    Date Referral Received:  11/29/2022    Today's Outreach:  [x] First   [] Second  [] Third       Third outreach made by: [] Phone  [] Email / mail    [] [place]     Intervention:  [] Spoke with Patient   [x] Left VM requesting return call      Outcome:     Attempted ACP outreach - no answer. Left VM offering a conversation with an ACP Specialist for assistance with advance care planning. Will attempt outreach again in one week if return call not received. Next Step:   [] ACP scheduled conversation  [x] Outreach again in one week               [] Email / Mail ACP Info Sheets  [] Email / Mail Advance Directive   [] Closing referral.  Routing closure to referring provider/staff and to ACP Specialist . [] Closure letter mailed to patient with invitation to contact ACP Specialist if / when ready.   Thank you for this referral.

## 2022-12-07 ENCOUNTER — PATIENT OUTREACH (OUTPATIENT)
Dept: CASE MANAGEMENT | Age: 77
End: 2022-12-07

## 2022-12-07 NOTE — ACP (ADVANCE CARE PLANNING)
Advance Care Planning   Ambulatory ACP Specialist Patient Outreach    Date:  12/7/2022    ACP Specialist:  Jackelyn Law    Outreach call to patient in follow-up to ACP Specialist referral from:  Diaz Maxwell MD    [x] PCP  [] Provider   [] Ambulatory Care Management [] Other     For:                  [x] Advance Directive Assistance              [] Complete Portable DNR order              [] Complete POST/MOST              [] Code Status Discussion             [] Discuss Goals of Care             [] Early ACP Decision-Making              [] Other (Specify)    Date Referral Received:  11/29/2022    Today's Outreach:  [] First   [x] Second  [] Third       Third outreach made by: [] Phone  [] Email / mail    [] [employer]     Intervention:  [x] Spoke with Patient's Daughter   [] Left VM requesting return call      Outcome:  Patient and family agreeable to a conversation with ACP Specialist Ivonne Bruner on Thursday, December 8, 2022 @ 9:00 AM.  Copy of [facility] AMD and ACP information sheets sent to email provided by family. Next Step:   [x] ACP scheduled conversation  [] Outreach again in one week               [x] Email / Mail ACP Info Sheets  [x] Email / Mail Advance Directive   [] Closing referral.  Routing closure to referring provider/staff and to ACP Specialist . [] Closure letter mailed to patient with invitation to contact ACP Specialist if / when ready.   Thank you for this referral.

## 2022-12-08 ENCOUNTER — DOCUMENTATION ONLY (OUTPATIENT)
Dept: CASE MANAGEMENT | Age: 77
End: 2022-12-08

## 2022-12-08 NOTE — ACP (ADVANCE CARE PLANNING)
Advance Care Planning     Advance Care Planning Clinical Specialist  Conversation Note      Date of ACP Conversation: 12/08/22    Conversation Conducted with:  Patient with Decision Making Capacity    ACP Clinical Specialist: Jamar Gillette, [id number] [facility] Decision Maker:    Current Designated Health Care Decision Maker:     Primary Decision Maker: Roxy Martell - Child - [phone]    Supplemental (Other) Decision Maker: Josefa Marlonelizabeth \"Rudy\" - [facility] [phone]      Care Preferences    Hospitalization: \"If your health worsens and it becomes clear that your chance of recovery is unlikely, what would your preference be regarding hospitalization? \"    Choice:  [x]  The patient wants hospitalization  []  The patient prefers comfort-focused treatment without hospitalization. Ventilation: \"If you were in your present state of health and suddenly became very ill and were unable to breathe on your own, what would your preference be about the use of a ventilator (breathing machine) if it were available to you? \"      If patient would desire the use of a ventilator (breathing machine), answer \"yes\", if not \"no\":yes    \"If your health worsens and it becomes clear that your chance of recovery is unlikely, what would your preference be about the use of a ventilator (breathing machine) if it were available to you? \"     Would the patient desire the use of a ventilator (breathing machine)? NO      Resuscitation  \"CPR works best to restart the heart when there is a sudden event, like a heart attack, in someone who is otherwise healthy. Unfortunately, CPR does not typically restart the heart for people who have serious health conditions or who are very sick. \"    \"In the event your heart stopped as a result of an underlying serious health condition, would you want attempts to be made to restart your heart (answer \"yes\" for attempt to resuscitate) or would you prefer a natural death (answer \"no\" for do not attempt to resuscitate)? \" yes      [x] Yes  [] No   Educated Patient / Valetta Cons regarding differences between Advance Directives and portable DNR orders. Length of ACP Conversation in minutes:  30 minutes. Pt and daughter participated in the conversation. Pt answered questions appropriately and expressed her desire not to update her AMD at this time. However, her only other child (son) was added to demographs in case Agent is unable or not available, she wanted to add her son't name/info in her chart which was done. Conversation Outcomes:  [x] ACP discussion completed  [x] Existing advance directive reviewed with patient; no changes to patient's previously recorded wishes   [] New Advance Directive completed   [] Portable Do Not Resuscitate prepared for Provider review and signature  [] POLST/POST/MOLST/MOST prepared for Provider review and signature      Follow-up plan:    [] Schedule follow-up conversation to continue planning  [] Referred individual to Provider for additional questions/concerns   [x] Advised patient/agent/surrogate to review completed ACP document and update if needed with changes in condition, patient preferences or care setting     [x] This note routed to one or more involved healthcare providers    12/8/2022: Specialist met with pt and daughter via phone to discuss ACP. Reviewed current AMD as well as discussed ventilators/CPR. Pt does not want to update current AMD at this time. Demographics updated in the chart. Referral closed.   Jasmine Winkler LCSW

## 2023-03-10 ENCOUNTER — OFFICE VISIT (OUTPATIENT)
Dept: NEUROLOGY | Age: 78
End: 2023-03-10

## 2023-03-10 VITALS
OXYGEN SATURATION: 98 % | WEIGHT: 122.4 LBS | SYSTOLIC BLOOD PRESSURE: 114 MMHG | HEIGHT: 64 IN | DIASTOLIC BLOOD PRESSURE: 84 MMHG | BODY MASS INDEX: 20.9 KG/M2 | RESPIRATION RATE: 18 BRPM | TEMPERATURE: 98.2 F | HEART RATE: 66 BPM

## 2023-03-10 DIAGNOSIS — G30.9 ALZHEIMER'S DISEASE, UNSPECIFIED (CODE) (HCC): Primary | ICD-10-CM

## 2023-03-10 DIAGNOSIS — R41.3 MEMORY LOSS: ICD-10-CM

## 2023-03-10 RX ORDER — MEMANTINE HYDROCHLORIDE 5 MG/1
5 TABLET ORAL 2 TIMES DAILY
Qty: 60 TABLET | Refills: 3 | Status: SHIPPED | OUTPATIENT
Start: 2023-03-10

## 2023-03-10 NOTE — PROGRESS NOTES
Chief Complaint   Patient presents with    Alzheimers     Follow up - has good and bad days - does not want to be told she has an issue -dtr tries not to remind her of the disease - patient states everything is fine      1. Have you been to the ER, urgent care clinic since your last visit? Hospitalized since your last visit? Yes [id number] for a fall     2. Have you seen or consulted any other health care providers outside of the [address] [place] [address] since your last visit? Include any pap smears or colon screening.   No

## 2023-03-10 NOTE — PROGRESS NOTES
[facility]  [address]  [place]  Tel: [phone]  Fax: [phone]      Date:  03/10/23     Name:  Ismael Mullen  :  1945  MRN:  [id number]     PCP:  Curly Palacio MD    Chief Complaint   Patient presents with    Alzheimers     Follow up - has good and bad days - does not want to be told she has an issue -dtr tries not to remind her of the disease - patient states everything is fine        HISTORY OF PRESENT ILLNESS:  Patient presents today for regular follow up. Per the patient she is doing ok for the most part. Pt gets very upset with her daughter when she reminds her of things, she screams and hangs up. Gets made and frustrated. It's a big change. Aricept 10mg qhs:  seems to be doing well. Still has word finding difficulty  Pt hit her head while putting groceries in the car, no pain, did have a good bruise. Still does some driving. She goes limited places, her daughter is okay with her doing some of the driving. Takes meds as rx'ed. she does not miss doses, her daughter feels very comfortable with how she administers her meds  Eats the same meals for the most part, no unsafe behaviors like leaving the stove or oven on. No regular exercise. Plans to do some activities with the [employer] ladies. Recap from last visit 2022:    1. Alzheimer's disease, unspecified: At this time we will go ahead and increase her Aricept to 10 mg nightly. Safety and side effects discussed with patient and her daughter. If any major changes, to include sleep disruption, hallucinations, safety or behavior concerns, worsening depression and/or anxiety they are to notify us at which time we can modify her plan of care. Discussed the benefits of sticking to the routine, continue with different activities during the day, patient is doing some limited driving, patient's daughter notes she will contact us if there are any concerns.   -     donepeziL (ARICEPT) 10 mg tablet; Take 1 Tablet by mouth nightly., Normal, Disp-30 Tablet, R-5  2. Memory loss: We will go ahead and increase patient's Aricept, will monitor her progression and modify plan of care as found appropriate.  -     donepeziL (ARICEPT) 10 mg tablet; Take 1 Tablet by mouth nightly., Normal, Disp-30 Tablet, R-5  3. Word finding difficulty: Patient has completed speech therapy, continue with home exercises. REVIEW OF SYSTEMS:     Review of Systems   Musculoskeletal:  Negative for falls (no recent falls). Neurological:  Positive for speech change (notes word finding difficulties). Psychiatric/Behavioral:  Positive for memory loss. The patient is nervous/anxious (Patient gets a little agitated at times. ). The patient does not have insomnia (sleep is better). All other systems reviewed and are negative. Current Outpatient Medications   Medication Sig    memantine (Namenda) 5 mg tablet Take 1 Tablet by mouth two (2) times a day. donepeziL (ARICEPT) 10 mg tablet Take 1 Tablet by mouth nightly. atenoloL (TENORMIN) 50 mg tablet TAKE 1 TABLET BY MOUTH TWICE DAILY    VITAMIN A PO Take  by mouth daily. cholecalciferol (VITAMIN D3) 25 mcg (1,000 unit) cap Take  by mouth daily. aspirin delayed-release 81 mg tablet Take  by mouth daily. ascorbic acid, vitamin C, (VITAMIN C) 250 mg tablet Take 250 mg by mouth daily. CALCIUM CARBONATE/VITAMIN D3 (CALCIUM+D PO) Take 1 Tab by mouth two (2) times a day. No current facility-administered medications for this visit.      Allergies   Allergen Reactions    Diclofenac Unknown (comments)     Patient reports she is not allergic     Past Medical History:   Diagnosis Date    Age-related osteoporosis without current pathological fracture 7/11/2019    Cataract 12/11/2017    Chronic kidney insufficiency 12/11/2017    Depression 12/11/2017    Dyslipidemia 12/11/2017    Fatigue 12/11/2017    GERD (gastroesophageal reflux disease)     Glaucoma 12/11/2017    Gout 12/11/2017    Hypertension     Hypertension, benign 12/11/2017    IGT (impaired glucose tolerance) 1/9/2019    On statin therapy 12/11/2017    Peripheral arterial disease ([address] 1/17/2020    Status post stent right SFA    Pre-ulcerative corn or callous 12/11/2017    Right foot injury 12/11/2017    Statin intolerance 2/25/2022     Past Surgical History:   Procedure Laterality Date    COLONOSCOPY N/A 1/6/2017    COLONOSCOPY performed by Mike Agosto MD at [facility] ENDOSCOPY    HX HEENT      eye surgery    SC COLON CA SCRN NOT HI RSK IND  1/6/2017          Social History     Socioeconomic History    Marital status:      Spouse name: Not on file    Number of children: Not on file    Years of education: Not on file    Highest education level: Not on file   Occupational History    Not on file   Tobacco Use    Smoking status: Former    Smokeless tobacco: Never    Tobacco comments: In early twenties    [de-identified] Use    Vaping Use: Never used   Substance and Sexual Activity    Alcohol use: Yes     Alcohol/week: 1.0 standard drink     Types: 1 Glasses of wine per week     Comment: not currently    Drug use: No    Sexual activity: Not Currently   Other Topics Concern    Not on file   Social History Narrative    Not on file     Social Determinants of Health     Financial Resource Strain: Not on file   Food Insecurity: Not on file   Transportation Needs: Not on file   Physical Activity: Not on file   Stress: Not on file   Social Connections: Not on file   Intimate Partner Violence: Not on file   Housing Stability: Not on file     History reviewed. No pertinent family history.       PHYSICAL EXAMINATION:    Visit Vitals  /84 (BP 1 Location: Left upper arm, BP Patient Position: Sitting, BP Cuff Size: Small adult)   Pulse 66   Temp 98.2 °F (36.8 °C) (Temporal)   Resp 18   Ht 5' 3.5\" (1.613 m)   Wt 122 lb 6.4 oz (55.5 kg)   SpO2 98%   BMI 21.34 kg/m²       General:  Well defined, nourished, and well groomed individual in no acute distress. Neck: Supple, nontender, normal range of motion. Musculoskeletal:  Extremities revealed no edema and had full range of motion of joints. Psych:  Good mood and bright affect with her daughter. Pt got a little frustrated/agitated at times. NEUROLOGICAL EXAMINATION:     Mental Status:   Alert and oriented to person, place, she couldn't name the month but she could name the year. Attention span and concentration are limited. Clear speech. Fund of knowledge preserved. Word recall: 0/3. Clock Test: did well except placement of the hour/minute hand. Pt couldn't spell world correctly forward. Cranial Nerves:   PERRLA. Visual fields were full  EOM: no evidence of nystagmus  Facial sensation:  normal and symmetric  Facial motor: normal and symmetric, no facial droop noted. Hearing intact  SCM strength intact  Tongue: midline without fasciculations    Motor Examination: Normal tone. 5/5 muscle strength in bilateral upper extremities. No cogwheel rigidity. No muscle wasting, no twitching or fasciculation noted. Sensory exam:  Normal throughout to light touch in BUE. Coordination:   Finger to nose and rapid arm movement testing was normal.  No resting or intention tremor. Negative Romberg, negative pronator drift. Gait and Station:  Steady gait, slight difficulty tandem walking. Normal arm swing. Reflexes:  DTRs 2+ in bilateral biceps, brachioradialis, patella. ASSESSMENT AND PLAN      ICD-10-CM ICD-9-CM    1. Alzheimer's disease, unspecified  G30.9 331.0       2. Memory loss  R41.3 780.93         1. Alzheimer's disease, unspecified: The patient is to continue Aricept 10 mg nightly, she appears to be doing well with this. We will add on the Namenda 5 mg twice a day. Discussed the benefits of the addition of this medication. I have also ordered neuropsych testing, can modify plan of care based on results.   We did briefly discuss driving, discussed sheltering arms as a driving assessment if there are any other concerns. Patient and/or daughter are to contact us if things worsen or if there are any unsafe situations or behaviors, if she has any side effects or problems to the medications.  -     REFERRAL TO NEUROPSYCHOLOGY  -     memantine (Namenda) 5 mg tablet; Take 1 Tablet by mouth two (2) times a day., Normal, Disp-60 Tablet, R-3  2. Memory loss: Patient is continue Aricept as prescribed, we are supplementing with Namenda 10 mg twice a day. Safety and side effects of medication were discussed. Neuropsych testing ordered. -     REFERRAL TO NEUROPSYCHOLOGY    Patient and/or family verbalized understand of all instructions and all questions/concerns were addressed. Safety/side effects of medications discussed. Patient remains a complex patient secondary to polypharmacy, significant comorbid conditions, and use of multiple medications which complicate the decision making process related to patient's neurologic diagnosis. I will see patient back upon completion of neuropsych testing, return to clinic sooner if needed.     Yanely Loyola, LISSET-BC

## 2023-03-10 NOTE — PATIENT INSTRUCTIONS
Consider Driving evaluation/test at [address] [place] [address] if there are any concerns.     Continue the Aricept, consider Namenda to add to the Aricept to further help with your memory    We will also order Neuropsych testing

## 2023-05-18 RX ORDER — DONEPEZIL HYDROCHLORIDE 10 MG/1
10 TABLET, FILM COATED ORAL NIGHTLY
Qty: 90 TABLET | Refills: 3 | Status: SHIPPED | OUTPATIENT
Start: 2023-05-18

## 2023-06-07 ENCOUNTER — OFFICE VISIT (OUTPATIENT)
Facility: CLINIC | Age: 78
End: 2023-06-07
Payer: MEDICARE

## 2023-06-07 VITALS
HEART RATE: 69 BPM | OXYGEN SATURATION: 100 % | SYSTOLIC BLOOD PRESSURE: 118 MMHG | WEIGHT: 126.8 LBS | DIASTOLIC BLOOD PRESSURE: 78 MMHG | RESPIRATION RATE: 20 BRPM | HEIGHT: 64 IN | TEMPERATURE: 98.3 F | BODY MASS INDEX: 21.65 KG/M2

## 2023-06-07 DIAGNOSIS — N18.31 CHRONIC KIDNEY DISEASE, STAGE 3A (HCC): ICD-10-CM

## 2023-06-07 DIAGNOSIS — E78.5 DYSLIPIDEMIA: ICD-10-CM

## 2023-06-07 DIAGNOSIS — R73.02 IGT (IMPAIRED GLUCOSE TOLERANCE): Primary | ICD-10-CM

## 2023-06-07 DIAGNOSIS — G30.9 ALZHEIMER'S DISEASE, UNSPECIFIED (CODE) (HCC): ICD-10-CM

## 2023-06-07 DIAGNOSIS — M81.0 AGE-RELATED OSTEOPOROSIS WITHOUT CURRENT PATHOLOGICAL FRACTURE: ICD-10-CM

## 2023-06-07 PROBLEM — I73.9 PERIPHERAL ARTERIAL DISEASE (HCC): Status: RESOLVED | Noted: 2020-01-17 | Resolved: 2023-06-07

## 2023-06-07 PROCEDURE — 99214 OFFICE O/P EST MOD 30 MIN: CPT | Performed by: INTERNAL MEDICINE

## 2023-06-07 PROCEDURE — 1090F PRES/ABSN URINE INCON ASSESS: CPT | Performed by: INTERNAL MEDICINE

## 2023-06-07 PROCEDURE — 3078F DIAST BP <80 MM HG: CPT | Performed by: INTERNAL MEDICINE

## 2023-06-07 PROCEDURE — 3074F SYST BP LT 130 MM HG: CPT | Performed by: INTERNAL MEDICINE

## 2023-06-07 PROCEDURE — G8399 PT W/DXA RESULTS DOCUMENT: HCPCS | Performed by: INTERNAL MEDICINE

## 2023-06-07 PROCEDURE — G8427 DOCREV CUR MEDS BY ELIG CLIN: HCPCS | Performed by: INTERNAL MEDICINE

## 2023-06-07 PROCEDURE — G8420 CALC BMI NORM PARAMETERS: HCPCS | Performed by: INTERNAL MEDICINE

## 2023-06-07 PROCEDURE — 1036F TOBACCO NON-USER: CPT | Performed by: INTERNAL MEDICINE

## 2023-06-07 PROCEDURE — 1123F ACP DISCUSS/DSCN MKR DOCD: CPT | Performed by: INTERNAL MEDICINE

## 2023-06-07 NOTE — PROGRESS NOTES
Jeferson Vo is a 76 y.o. female presenting for 6 Month Follow-Up  . 1. Have you been to the ER, urgent care clinic since your last visit? Hospitalized since your last visit? No    2. Have you seen or consulted any other health care providers outside of the [address] [place] [address] since your last visit? Include any pap smears or colon screening.  No
Relevent patient education was performed. Verbal and/or written instructions (see AVS) provided. The most recent lab findings were reviewed with the patient. Plan was discussed with patient who verbally expressed understanding. An After Visit Summary was printed and given to the patient.     Lilian Almeida MD

## 2023-11-01 ENCOUNTER — OFFICE VISIT (OUTPATIENT)
Facility: CLINIC | Age: 78
End: 2023-11-01
Payer: MEDICARE

## 2023-11-01 VITALS
BODY MASS INDEX: 21.54 KG/M2 | SYSTOLIC BLOOD PRESSURE: 126 MMHG | HEART RATE: 68 BPM | HEIGHT: 64 IN | TEMPERATURE: 97.7 F | DIASTOLIC BLOOD PRESSURE: 76 MMHG | OXYGEN SATURATION: 100 % | RESPIRATION RATE: 20 BRPM | WEIGHT: 126.2 LBS

## 2023-11-01 DIAGNOSIS — M81.0 AGE-RELATED OSTEOPOROSIS WITHOUT CURRENT PATHOLOGICAL FRACTURE: ICD-10-CM

## 2023-11-01 DIAGNOSIS — I10 HYPERTENSION, BENIGN: Primary | ICD-10-CM

## 2023-11-01 DIAGNOSIS — G30.9 ALZHEIMER'S DISEASE, UNSPECIFIED (CODE) (HCC): ICD-10-CM

## 2023-11-01 DIAGNOSIS — N18.30 STAGE 3 CHRONIC KIDNEY DISEASE, UNSPECIFIED WHETHER STAGE 3A OR 3B CKD (HCC): ICD-10-CM

## 2023-11-01 PROCEDURE — 1090F PRES/ABSN URINE INCON ASSESS: CPT | Performed by: INTERNAL MEDICINE

## 2023-11-01 PROCEDURE — 1123F ACP DISCUSS/DSCN MKR DOCD: CPT | Performed by: INTERNAL MEDICINE

## 2023-11-01 PROCEDURE — G8427 DOCREV CUR MEDS BY ELIG CLIN: HCPCS | Performed by: INTERNAL MEDICINE

## 2023-11-01 PROCEDURE — 99213 OFFICE O/P EST LOW 20 MIN: CPT | Performed by: INTERNAL MEDICINE

## 2023-11-01 PROCEDURE — G8484 FLU IMMUNIZE NO ADMIN: HCPCS | Performed by: INTERNAL MEDICINE

## 2023-11-01 PROCEDURE — 3074F SYST BP LT 130 MM HG: CPT | Performed by: INTERNAL MEDICINE

## 2023-11-01 PROCEDURE — 1036F TOBACCO NON-USER: CPT | Performed by: INTERNAL MEDICINE

## 2023-11-01 PROCEDURE — G8399 PT W/DXA RESULTS DOCUMENT: HCPCS | Performed by: INTERNAL MEDICINE

## 2023-11-01 PROCEDURE — G8420 CALC BMI NORM PARAMETERS: HCPCS | Performed by: INTERNAL MEDICINE

## 2023-11-01 PROCEDURE — 3078F DIAST BP <80 MM HG: CPT | Performed by: INTERNAL MEDICINE

## 2023-11-01 NOTE — PROGRESS NOTES
acknowledges that she will no longer drive. She has friends and family that we will be able to transport her get her groceries etc.  If she is unwilling to voluntarily give up driving we will need to submit official papers to the [facility] for review in which case her 's license can be officially suspended if needed. Follow-up and Dispositions    Return for as scheduled. I have reviewed with the patient details of the assessment and plan and all questions were answered. Relevent patient education was performed. Verbal and/or written instructions (see AVS) provided. The most recent lab findings were reviewed with the patient. Plan was discussed with patient who verbally expressed understanding. An After Visit Summary was printed and given to the patient.     Bernie Aj MD

## 2023-11-01 NOTE — PROGRESS NOTES
Jean Elena is a 78 y.o. female presenting for Follow-up Chronic Condition  . 1. Have you been to the ER, urgent care clinic since your last visit? Hospitalized since your last visit? No    2. Have you seen or consulted any other health care providers outside of the [address] [place] [address] since your last visit? Include any pap smears or colon screening.  No

## 2023-11-10 RX ORDER — ATENOLOL 50 MG/1
50 TABLET ORAL 2 TIMES DAILY
Qty: 180 TABLET | Refills: 1 | Status: SHIPPED | OUTPATIENT
Start: 2023-11-10

## 2024-01-02 ENCOUNTER — OFFICE VISIT (OUTPATIENT)
Age: 79
End: 2024-01-02
Payer: MEDICARE

## 2024-01-02 DIAGNOSIS — F02.B11 MODERATE LATE ONSET ALZHEIMER'S DEMENTIA WITH AGITATION (HCC): Primary | ICD-10-CM

## 2024-01-02 DIAGNOSIS — G30.1 MODERATE LATE ONSET ALZHEIMER'S DEMENTIA WITH AGITATION (HCC): Primary | ICD-10-CM

## 2024-01-02 PROCEDURE — 1123F ACP DISCUSS/DSCN MKR DOCD: CPT | Performed by: CLINICAL NEUROPSYCHOLOGIST

## 2024-01-02 PROCEDURE — 90791 PSYCH DIAGNOSTIC EVALUATION: CPT | Performed by: CLINICAL NEUROPSYCHOLOGIST

## 2024-01-02 PROCEDURE — 96136 PSYCL/NRPSYC TST PHY/QHP 1ST: CPT | Performed by: CLINICAL NEUROPSYCHOLOGIST

## 2024-01-02 PROCEDURE — 96133 NRPSYC TST EVAL PHYS/QHP EA: CPT | Performed by: CLINICAL NEUROPSYCHOLOGIST

## 2024-01-02 PROCEDURE — 96132 NRPSYC TST EVAL PHYS/QHP 1ST: CPT | Performed by: CLINICAL NEUROPSYCHOLOGIST

## 2024-01-02 PROCEDURE — 1036F TOBACCO NON-USER: CPT | Performed by: CLINICAL NEUROPSYCHOLOGIST

## 2024-01-02 NOTE — PROGRESS NOTES
Neuropsychological Evaluation Report      Patient Name: Dianne Powers  YOB: 1945    Age: 78 y.o.  Date of Intake: 2024   Education: 12 Date of Testin2024   Gender: Female Ethnicity: White     Referring Provider: KARRIE Gold     REASON FOR REFERRAL AND EVALUATION PROCEDURES:  Dianne Powers  was referred for neuropsychological evaluation by her Neurology Provider to obtain a quantitative assessment of her current level of neurocognitive functioning, assist in differential diagnosis, and aid in individualized treatment planning. The patient understood the rationale and procedures for evaluation, as well as the limits to confidentiality, and they agreed to participate. The patient consented to have this report made available to her  treating providers through her  electronic medical records. This evaluation was completed with the patient by Earle Bernal PsyD.  History Sources: Patient, Relative (daughter), Medical Record, and Test Data    SUMMARY AND IMPRESSION:  The following section is a summary of the patient's pertinent test results and impressions. A more thorough review of the patient's background and test scores can be found below.    The breadth of cognitive testing was limited due to the patient's level of impairment.  On a brief measure of cognitive functioning (MoCA), the patient scored in the range expected for moderate dementia (5/30 with education correction).  When individuals score in this range, further testing is not warranted as it is unlikely to provide additional information.  Regarding the etiology of the patient's impairment, Alzheimer's disease is suspected.  Based on available information, the patient lacks capacity and it is no longer safe for her to live independently.    ASSESSMENT:  Dementia, moderate, due to Alzheimer's disease, with agitation    RECOMMENDATIONS:  Prior to their departure, the patient and her daughter were provided with feedback

## 2024-01-16 ENCOUNTER — OFFICE VISIT (OUTPATIENT)
Age: 79
End: 2024-01-16
Payer: MEDICARE

## 2024-01-16 VITALS
TEMPERATURE: 97.8 F | HEIGHT: 64 IN | SYSTOLIC BLOOD PRESSURE: 130 MMHG | BODY MASS INDEX: 20.76 KG/M2 | DIASTOLIC BLOOD PRESSURE: 94 MMHG | OXYGEN SATURATION: 96 % | RESPIRATION RATE: 18 BRPM | WEIGHT: 121.6 LBS | HEART RATE: 65 BPM

## 2024-01-16 DIAGNOSIS — F02.B11 MODERATE LATE ONSET ALZHEIMER'S DEMENTIA WITH AGITATION (HCC): Primary | ICD-10-CM

## 2024-01-16 DIAGNOSIS — G30.1 MODERATE LATE ONSET ALZHEIMER'S DEMENTIA WITH AGITATION (HCC): Primary | ICD-10-CM

## 2024-01-16 PROCEDURE — 3075F SYST BP GE 130 - 139MM HG: CPT | Performed by: NURSE PRACTITIONER

## 2024-01-16 PROCEDURE — 1090F PRES/ABSN URINE INCON ASSESS: CPT | Performed by: NURSE PRACTITIONER

## 2024-01-16 PROCEDURE — G8420 CALC BMI NORM PARAMETERS: HCPCS | Performed by: NURSE PRACTITIONER

## 2024-01-16 PROCEDURE — 3080F DIAST BP >= 90 MM HG: CPT | Performed by: NURSE PRACTITIONER

## 2024-01-16 PROCEDURE — 1123F ACP DISCUSS/DSCN MKR DOCD: CPT | Performed by: NURSE PRACTITIONER

## 2024-01-16 PROCEDURE — 99214 OFFICE O/P EST MOD 30 MIN: CPT | Performed by: NURSE PRACTITIONER

## 2024-01-16 PROCEDURE — G8427 DOCREV CUR MEDS BY ELIG CLIN: HCPCS | Performed by: NURSE PRACTITIONER

## 2024-01-16 PROCEDURE — G8484 FLU IMMUNIZE NO ADMIN: HCPCS | Performed by: NURSE PRACTITIONER

## 2024-01-16 PROCEDURE — G8399 PT W/DXA RESULTS DOCUMENT: HCPCS | Performed by: NURSE PRACTITIONER

## 2024-01-16 PROCEDURE — 1036F TOBACCO NON-USER: CPT | Performed by: NURSE PRACTITIONER

## 2024-01-16 RX ORDER — MEMANTINE HYDROCHLORIDE 10 MG/1
10 TABLET ORAL 2 TIMES DAILY
Qty: 180 TABLET | Refills: 1 | Status: SHIPPED | OUTPATIENT
Start: 2024-01-16

## 2024-01-16 RX ORDER — OYSTER SHELL CALCIUM WITH VITAMIN D 500; 200 MG/1; [IU]/1
1 TABLET, FILM COATED ORAL DAILY
COMMUNITY

## 2024-01-16 ASSESSMENT — PATIENT HEALTH QUESTIONNAIRE - PHQ9
1. LITTLE INTEREST OR PLEASURE IN DOING THINGS: 0
SUM OF ALL RESPONSES TO PHQ9 QUESTIONS 1 & 2: 0
SUM OF ALL RESPONSES TO PHQ QUESTIONS 1-9: 0
2. FEELING DOWN, DEPRESSED OR HOPELESS: 0
SUM OF ALL RESPONSES TO PHQ QUESTIONS 1-9: 0

## 2024-01-16 ASSESSMENT — ENCOUNTER SYMPTOMS
TROUBLE SWALLOWING: 0
BACK PAIN: 1

## 2024-01-16 NOTE — PROGRESS NOTES
Chief Complaint   Patient presents with    Dementia     Per daughter - memory has worsened      1. Have you been to the ER, urgent care clinic since your last visit?  Hospitalized since your last visit? No     2. Have you seen or consulted any other health care providers outside of the [facility] since your last visit?  Include any pap smears or colon screening.  No     
hour/minute hand.    Cranial Nerves:   PERRLA.  Visual fields were full  EOM: no evidence of nystagmus  Facial sensation:  normal and symmetric  Facial motor: normal and symmetric, no facial droop noted.  Hearing intact  SCM strength intact  Tongue: midline without fasciculations    Motor Examination: Normal tone. 5/5 muscle strength in bilateral upper extremities. No cogwheel rigidity.  No muscle wasting, no twitching or fasciculation noted.      Sensory exam:  Normal throughout to light touch in BUE.    Coordination:   Finger to nose and rapid arm movement testing was normal, patient had slight difficulty following commands.  No resting or intention tremor.  Negative Romberg, negative pronator drift.      Gait and Station:  Slow relatively steady gait.  Normal arm swing.      Reflexes:  DTRs 2+ in bilateral biceps, brachioradialis, patella.    ASSESSMENT AND PLAN     Diagnosis Orders   1. Moderate late onset Alzheimer's dementia with agitation (HCC)  memantine (NAMENDA) 10 MG tablet        1.  Moderate late onset Alzheimer's dementia with agitation: Neuropsych testing results have been reviewed with patient and her daughter, her daughter is planning on modifying their living arrangements to better provide caregiving for her mother, they will be living together.  Patient is continue Aricept 10 mg nightly.  I have increased the patient's Namenda to 10 mg twice a day.  Safety and side effects were discussed.  Advised there are supplemental medications that we may utilize if the agitation worsens, patient's daughter is instructed to contact us , at which time we can prescribe accordingly.  Patient is no longer driving. I discussed with the patient and family members in regards to the patient's cognitive limitations and need to ensure patient safety.  Attempts to minimize opportunities of harm is important.   Activities to be monitored, or avoided, would include cooking, ironing clothes, financial bill payments.

## 2024-01-31 RX ORDER — DONEPEZIL HYDROCHLORIDE 10 MG/1
10 TABLET, FILM COATED ORAL NIGHTLY
Qty: 90 TABLET | Refills: 3 | Status: SHIPPED | OUTPATIENT
Start: 2024-01-31

## 2024-03-12 ENCOUNTER — OFFICE VISIT (OUTPATIENT)
Facility: CLINIC | Age: 79
End: 2024-03-12
Payer: MEDICARE

## 2024-03-12 VITALS
OXYGEN SATURATION: 98 % | RESPIRATION RATE: 14 BRPM | DIASTOLIC BLOOD PRESSURE: 78 MMHG | WEIGHT: 113.4 LBS | HEIGHT: 64 IN | HEART RATE: 60 BPM | BODY MASS INDEX: 19.36 KG/M2 | SYSTOLIC BLOOD PRESSURE: 118 MMHG | TEMPERATURE: 97.9 F

## 2024-03-12 DIAGNOSIS — N18.30 STAGE 3 CHRONIC KIDNEY DISEASE, UNSPECIFIED WHETHER STAGE 3A OR 3B CKD (HCC): ICD-10-CM

## 2024-03-12 DIAGNOSIS — R73.02 IGT (IMPAIRED GLUCOSE TOLERANCE): ICD-10-CM

## 2024-03-12 DIAGNOSIS — R23.9 UNSPECIFIED SKIN CHANGES: ICD-10-CM

## 2024-03-12 DIAGNOSIS — Z00.00 MEDICARE ANNUAL WELLNESS VISIT, SUBSEQUENT: Primary | ICD-10-CM

## 2024-03-12 DIAGNOSIS — M81.0 AGE-RELATED OSTEOPOROSIS WITHOUT CURRENT PATHOLOGICAL FRACTURE: ICD-10-CM

## 2024-03-12 DIAGNOSIS — G30.9 ALZHEIMER'S DISEASE, UNSPECIFIED (CODE) (HCC): ICD-10-CM

## 2024-03-12 DIAGNOSIS — I10 HYPERTENSION, BENIGN: ICD-10-CM

## 2024-03-12 PROCEDURE — 3074F SYST BP LT 130 MM HG: CPT | Performed by: INTERNAL MEDICINE

## 2024-03-12 PROCEDURE — 1123F ACP DISCUSS/DSCN MKR DOCD: CPT | Performed by: INTERNAL MEDICINE

## 2024-03-12 PROCEDURE — G0439 PPPS, SUBSEQ VISIT: HCPCS | Performed by: INTERNAL MEDICINE

## 2024-03-12 PROCEDURE — G8484 FLU IMMUNIZE NO ADMIN: HCPCS | Performed by: INTERNAL MEDICINE

## 2024-03-12 PROCEDURE — 3078F DIAST BP <80 MM HG: CPT | Performed by: INTERNAL MEDICINE

## 2024-03-12 ASSESSMENT — PATIENT HEALTH QUESTIONNAIRE - PHQ9
2. FEELING DOWN, DEPRESSED OR HOPELESS: 0
SUM OF ALL RESPONSES TO PHQ QUESTIONS 1-9: 0
SUM OF ALL RESPONSES TO PHQ9 QUESTIONS 1 & 2: 0
SUM OF ALL RESPONSES TO PHQ QUESTIONS 1-9: 0
1. LITTLE INTEREST OR PLEASURE IN DOING THINGS: 0

## 2024-03-12 ASSESSMENT — LIFESTYLE VARIABLES
HOW OFTEN DO YOU HAVE A DRINK CONTAINING ALCOHOL: NEVER
HOW MANY STANDARD DRINKS CONTAINING ALCOHOL DO YOU HAVE ON A TYPICAL DAY: PATIENT DOES NOT DRINK

## 2024-03-12 NOTE — PATIENT INSTRUCTIONS
for your use of this information.           Advance Directives: Care Instructions  Overview  An advance directive is a legal way to state your wishes at the end of your life. It tells your family and your doctor what to do if you can't say what you want.  There are two main types of advance directives. You can change them any time your wishes change.  Living will.  This form tells your family and your doctor your wishes about life support and other treatment. The form is also called a declaration.  Medical power of .  This form lets you name a person to make treatment decisions for you when you can't speak for yourself. This person is called a health care agent (health care proxy, health care surrogate). The form is also called a durable power of  for health care.  If you do not have an advance directive, decisions about your medical care may be made by a family member, or by a doctor or a  who doesn't know you.  It may help to think of an advance directive as a gift to the people who care for you. If you have one, they won't have to make tough decisions by themselves.  For more information, including forms for your state, see the [employer] website (www.caringinfo.org/planning/advance-directives/).  Follow-up care is a key part of your treatment and safety. Be sure to make and go to all appointments, and call your doctor if you are having problems. It's also a good idea to know your test results and keep a list of the medicines you take.  What should you include in an advance directive?  Many states have a unique advance directive form. (It may ask you to address specific issues.) Or you might use a universal form that's approved by many states.  If your form doesn't tell you what to address, it may be hard to know what to include in your advance directive. Use the questions below to help you get started.  Who do you want to make decisions about your medical care if you are not able to?  What

## 2024-03-12 NOTE — PROGRESS NOTES
Dianne Powers is a 78 y.o. female presenting for 6 Month Follow-Up and Medicare AWV  .     \"Have you been to the ER, urgent care clinic since your last visit?  Hospitalized since your last visit?\"    NO    “Have you seen or consulted any other health care providers outside of [facility] since your last visit?”    NO                                   
Reconciliation, Ar   aspirin 81 MG EC tablet Take by mouth daily Yes Automatic Reconciliation, Ar   vitamin D 25 MCG (1000 UT) CAPS Take by mouth daily Yes Automatic Reconciliation, Ar       CareTeam (Including outside providers/suppliers regularly involved in providing care):   Patient Care Team:  JAMIE Madison MD as PCP - General (Internal Medicine)  JAMIE Madison MD as PCP - Empaneled Provider     Reviewed and updated this visit:  Tobacco  Allergies  Meds  Med Hx  Surg Hx  Soc Hx  Fam Hx

## 2024-03-14 LAB
25(OH)D3 SERPL-MCNC: 79.3 NG/ML (ref 30–100)
ALBUMIN SERPL-MCNC: 4.3 G/DL (ref 3.5–5)
ALBUMIN/GLOB SERPL: 1.5 (ref 1.1–2.2)
ALP SERPL-CCNC: 129 U/L (ref 45–117)
ALT SERPL-CCNC: 24 U/L (ref 12–78)
ANION GAP SERPL CALC-SCNC: 9 MMOL/L (ref 5–15)
AST SERPL-CCNC: 23 U/L (ref 15–37)
BASOPHILS # BLD: 0.1 K/UL (ref 0–0.1)
BASOPHILS NFR BLD: 1 % (ref 0–1)
BILIRUB SERPL-MCNC: 1.1 MG/DL (ref 0.2–1)
BUN SERPL-MCNC: 21 MG/DL (ref 6–20)
BUN/CREAT SERPL: 21 (ref 12–20)
CALCIUM SERPL-MCNC: 9.9 MG/DL (ref 8.5–10.1)
CHLORIDE SERPL-SCNC: 110 MMOL/L (ref 97–108)
CHOLEST SERPL-MCNC: 227 MG/DL
CO2 SERPL-SCNC: 23 MMOL/L (ref 21–32)
CREAT SERPL-MCNC: 1.01 MG/DL (ref 0.55–1.02)
DIFFERENTIAL METHOD BLD: ABNORMAL
EOSINOPHIL # BLD: 0.2 K/UL (ref 0–0.4)
EOSINOPHIL NFR BLD: 2 % (ref 0–7)
ERYTHROCYTE [DISTWIDTH] IN BLOOD BY AUTOMATED COUNT: 14.8 % (ref 11.5–14.5)
EST. AVERAGE GLUCOSE BLD GHB EST-MCNC: 105 MG/DL
GLOBULIN SER CALC-MCNC: 2.9 G/DL (ref 2–4)
GLUCOSE SERPL-MCNC: 86 MG/DL (ref 65–100)
HBA1C MFR BLD: 5.3 % (ref 4–5.6)
HCT VFR BLD AUTO: 51.8 % (ref 35–47)
HDLC SERPL-MCNC: 42 MG/DL
HDLC SERPL: 5.4 (ref 0–5)
HGB BLD-MCNC: 16.7 G/DL (ref 11.5–16)
IMM GRANULOCYTES # BLD AUTO: 0 K/UL (ref 0–0.04)
IMM GRANULOCYTES NFR BLD AUTO: 0 % (ref 0–0.5)
LDLC SERPL CALC-MCNC: 161.2 MG/DL (ref 0–100)
LYMPHOCYTES # BLD: 3.8 K/UL (ref 0.8–3.5)
LYMPHOCYTES NFR BLD: 44 % (ref 12–49)
MCH RBC QN AUTO: 26.5 PG (ref 26–34)
MCHC RBC AUTO-ENTMCNC: 32.2 G/DL (ref 30–36.5)
MCV RBC AUTO: 82.2 FL (ref 80–99)
MONOCYTES # BLD: 0.7 K/UL (ref 0–1)
MONOCYTES NFR BLD: 8 % (ref 5–13)
NEUTS SEG # BLD: 3.8 K/UL (ref 1.8–8)
NEUTS SEG NFR BLD: 45 % (ref 32–75)
NRBC # BLD: 0 K/UL (ref 0–0.01)
NRBC BLD-RTO: 0 PER 100 WBC
PLATELET # BLD AUTO: 287 K/UL (ref 150–400)
PMV BLD AUTO: 11.4 FL (ref 8.9–12.9)
POTASSIUM SERPL-SCNC: 3.4 MMOL/L (ref 3.5–5.1)
PROT SERPL-MCNC: 7.2 G/DL (ref 6.4–8.2)
RBC # BLD AUTO: 6.3 M/UL (ref 3.8–5.2)
SODIUM SERPL-SCNC: 142 MMOL/L (ref 136–145)
TRIGL SERPL-MCNC: 119 MG/DL
VLDLC SERPL CALC-MCNC: 23.8 MG/DL
WBC # BLD AUTO: 8.6 K/UL (ref 3.6–11)

## 2024-04-12 RX ORDER — MEMANTINE HYDROCHLORIDE 5 MG/1
5 TABLET ORAL 2 TIMES DAILY
Qty: 60 TABLET | OUTPATIENT
Start: 2024-04-12

## 2024-04-12 RX ORDER — ATENOLOL 50 MG/1
50 TABLET ORAL 2 TIMES DAILY
Qty: 180 TABLET | Refills: 1 | Status: SHIPPED | OUTPATIENT
Start: 2024-04-12

## 2024-04-12 NOTE — TELEPHONE ENCOUNTER
PCP: JAMIE Madison MD    Last appt: 3/12/2024    Future Appointments   Date Time Provider Department Center   7/18/2024 11:00 AM Cyndie Stafford APRN - NP [facility]   9/5/2024 10:30 AM JAMIE Madison MD [facility]       Requested Prescriptions     Pending Prescriptions Disp Refills    atenolol (TENORMIN) 50 MG tablet [Pharmacy Med Name: ATENOLOL 50MG TABLETS] 180 tablet 1     Sig: TAKE 1 TABLET BY MOUTH TWICE DAILY

## 2024-04-12 NOTE — TELEPHONE ENCOUNTER
Called patient and informed them that they are to be on the memantine 10mg and not the 5 mg they requested. Patient verbalized understanding and will get that medication.     Also updated phone number in chart.

## 2024-07-11 DIAGNOSIS — G30.1 MODERATE LATE ONSET ALZHEIMER'S DEMENTIA WITH AGITATION (HCC): ICD-10-CM

## 2024-07-11 DIAGNOSIS — F02.B11 MODERATE LATE ONSET ALZHEIMER'S DEMENTIA WITH AGITATION (HCC): ICD-10-CM

## 2024-07-11 RX ORDER — MEMANTINE HYDROCHLORIDE 10 MG/1
10 TABLET ORAL 2 TIMES DAILY
Qty: 180 TABLET | Refills: 1 | Status: SHIPPED | OUTPATIENT
Start: 2024-07-11

## 2024-07-18 ENCOUNTER — OFFICE VISIT (OUTPATIENT)
Age: 79
End: 2024-07-18
Payer: MEDICARE

## 2024-07-18 VITALS
TEMPERATURE: 97.7 F | RESPIRATION RATE: 16 BRPM | HEART RATE: 65 BPM | SYSTOLIC BLOOD PRESSURE: 122 MMHG | BODY MASS INDEX: 19.97 KG/M2 | HEIGHT: 64 IN | DIASTOLIC BLOOD PRESSURE: 80 MMHG | WEIGHT: 117 LBS | OXYGEN SATURATION: 97 %

## 2024-07-18 DIAGNOSIS — F02.B11 MODERATE LATE ONSET ALZHEIMER'S DEMENTIA WITH AGITATION (HCC): Primary | ICD-10-CM

## 2024-07-18 DIAGNOSIS — G30.1 MODERATE LATE ONSET ALZHEIMER'S DEMENTIA WITH AGITATION (HCC): Primary | ICD-10-CM

## 2024-07-18 PROCEDURE — 99214 OFFICE O/P EST MOD 30 MIN: CPT | Performed by: NURSE PRACTITIONER

## 2024-07-18 PROCEDURE — 1036F TOBACCO NON-USER: CPT | Performed by: NURSE PRACTITIONER

## 2024-07-18 PROCEDURE — 1123F ACP DISCUSS/DSCN MKR DOCD: CPT | Performed by: NURSE PRACTITIONER

## 2024-07-18 PROCEDURE — 3074F SYST BP LT 130 MM HG: CPT | Performed by: NURSE PRACTITIONER

## 2024-07-18 PROCEDURE — G8427 DOCREV CUR MEDS BY ELIG CLIN: HCPCS | Performed by: NURSE PRACTITIONER

## 2024-07-18 PROCEDURE — 3079F DIAST BP 80-89 MM HG: CPT | Performed by: NURSE PRACTITIONER

## 2024-07-18 PROCEDURE — G8420 CALC BMI NORM PARAMETERS: HCPCS | Performed by: NURSE PRACTITIONER

## 2024-07-18 PROCEDURE — G8399 PT W/DXA RESULTS DOCUMENT: HCPCS | Performed by: NURSE PRACTITIONER

## 2024-07-18 PROCEDURE — 1090F PRES/ABSN URINE INCON ASSESS: CPT | Performed by: NURSE PRACTITIONER

## 2024-07-18 RX ORDER — ESCITALOPRAM OXALATE 5 MG/1
5 TABLET ORAL DAILY
Qty: 30 TABLET | Refills: 5 | Status: SHIPPED | OUTPATIENT
Start: 2024-07-18

## 2024-07-18 ASSESSMENT — ENCOUNTER SYMPTOMS: TROUBLE SWALLOWING: 0

## 2024-07-18 NOTE — PROGRESS NOTES
Dianne Powers is a 78 y.o. female    Chief Complaint   Patient presents with    Follow-up     Moderate late onset Alzheimer's dementia with agitation (HCC)       /80   Pulse 65   Temp 97.7 °F (36.5 °C)   Resp 16   Ht 1.613 m (5' 3.5\")   Wt 53.1 kg (117 lb)   SpO2 97%   BMI 20.40 kg/m²         1. Have you been to the ER, urgent care clinic since your last visit?  Hospitalized since your last visit? No    2. Have you seen or consulted any other health care providers outside of the [facility] since your last visit?  Include any pap smears or colon screening. No    Learning Assessment:       No data to display                Fall Risk Assessment:      3/12/2024    10:54 AM 1/16/2024    10:11 AM 1/16/2024    10:06 AM 3/10/2023    10:41 AM 3/10/2023    10:37 AM 11/10/2022    10:15 AM 8/10/2022    10:08 AM   Amb Fall Risk Assessment and TUG Test   Do you feel unsteady or are you worried about falling?  no yes no       2 or more falls in past year? no yes no       Fall with injury in past year? no no no       Fall in past 12 months?    1 0 0 1   Able to walk?    Yes Yes Yes Yes       Abuse Screening:       No data to display                ADL Screening:       No data to display

## 2024-07-18 NOTE — PROGRESS NOTES
[facility]  [address]  [address]  [place] [address]  Tel: [phone]  Fax: [phone]      Date:  24     Name:  TIARA ARAIZA  :  1945  MRN:  [id number]     PCP:  JAMIE Madison MD    Chief Complaint   Patient presents with    Follow-up     Moderate late onset Alzheimer's dementia with agitation (HCC)       HISTORY OF PRESENT ILLNESS:  Patient presents today for regular follow-up, last seen 2024 for dementia, she is here today with her daughter Abby, who is her main caregiver.  Patient has completed neuropsych testing that noted moderate Alzheimer's dementia with agitation.  She is having a hard time getting around, they would like a handicap placard.  She has had a couple of falls, uncertain what happened. Hurt the right foot, it seems better.   Her daughter is her main caregiver, she lives 6 houses down, they plan to move in together in September. There will be cameras all around the house. Then someone in place to check on her if needed as the patient's daughter still is working out of the house several days a week  She does get angry at times, it escalates quickly.  Her daughter inquires about any things that could be helpful.  Pt is no longer driving.   PCP Dr Madison: regular follow up   Aricept 10mg qhs:  seems to be doing well.  Namenda 10mg BID: Takes as prescribed. Increased at last visit  Dresses herself. Simple breakfast (yogurt), enjoys oatmeal and fruits, likes peanut butter, etc. no longer doing any cooking.   Sticks with her routine.  Sleeps well, not a lot of dreaming. Tosses and turns some. She's takes her PM meds at 5 pm, then gets herself ready for bed, watches tv until she falls asleep around 7-9 pm, wakes some to void, then gets up and going early around 5:30 in the morning she enjoys watching good morning zach, hallmark, etc, a lot of tidying around the house.  Patient's daughter notes it has been quite a fiasco and

## 2024-09-05 ENCOUNTER — OFFICE VISIT (OUTPATIENT)
Facility: CLINIC | Age: 79
End: 2024-09-05

## 2024-09-05 ENCOUNTER — CLINICAL DOCUMENTATION (OUTPATIENT)
Facility: CLINIC | Age: 79
End: 2024-09-05

## 2024-09-05 VITALS
OXYGEN SATURATION: 99 % | HEART RATE: 61 BPM | DIASTOLIC BLOOD PRESSURE: 68 MMHG | TEMPERATURE: 98 F | SYSTOLIC BLOOD PRESSURE: 124 MMHG | RESPIRATION RATE: 16 BRPM | HEIGHT: 64 IN | BODY MASS INDEX: 19.43 KG/M2 | WEIGHT: 113.8 LBS

## 2024-09-05 DIAGNOSIS — I10 HYPERTENSION, BENIGN: Primary | ICD-10-CM

## 2024-09-05 DIAGNOSIS — N18.30 STAGE 3 CHRONIC KIDNEY DISEASE, UNSPECIFIED WHETHER STAGE 3A OR 3B CKD (HCC): ICD-10-CM

## 2024-09-05 DIAGNOSIS — Z78.9 STATIN INTOLERANCE: ICD-10-CM

## 2024-09-05 DIAGNOSIS — R73.02 IGT (IMPAIRED GLUCOSE TOLERANCE): ICD-10-CM

## 2024-09-05 DIAGNOSIS — G30.9 ALZHEIMER'S DISEASE, UNSPECIFIED (CODE) (HCC): ICD-10-CM

## 2024-09-05 DIAGNOSIS — R30.0 DYSURIA: ICD-10-CM

## 2024-09-05 DIAGNOSIS — M81.0 AGE-RELATED OSTEOPOROSIS WITHOUT CURRENT PATHOLOGICAL FRACTURE: ICD-10-CM

## 2024-09-05 SDOH — ECONOMIC STABILITY: FOOD INSECURITY: WITHIN THE PAST 12 MONTHS, YOU WORRIED THAT YOUR FOOD WOULD RUN OUT BEFORE YOU GOT MONEY TO BUY MORE.: NEVER TRUE

## 2024-09-05 SDOH — ECONOMIC STABILITY: FOOD INSECURITY: WITHIN THE PAST 12 MONTHS, THE FOOD YOU BOUGHT JUST DIDN'T LAST AND YOU DIDN'T HAVE MONEY TO GET MORE.: NEVER TRUE

## 2024-09-05 SDOH — ECONOMIC STABILITY: INCOME INSECURITY: HOW HARD IS IT FOR YOU TO PAY FOR THE VERY BASICS LIKE FOOD, HOUSING, MEDICAL CARE, AND HEATING?: NOT HARD AT ALL

## 2024-09-06 LAB
ALBUMIN SERPL-MCNC: 3.8 G/DL (ref 3.5–5)
ALBUMIN/GLOB SERPL: 1.2 (ref 1.1–2.2)
ALP SERPL-CCNC: 131 U/L (ref 45–117)
ALT SERPL-CCNC: 26 U/L (ref 12–78)
ANION GAP SERPL CALC-SCNC: 7 MMOL/L (ref 2–12)
APPEARANCE UR: ABNORMAL
AST SERPL-CCNC: 15 U/L (ref 15–37)
BACTERIA URNS QL MICRO: ABNORMAL /HPF
BASOPHILS # BLD: 0.1 K/UL (ref 0–0.1)
BASOPHILS NFR BLD: 1 % (ref 0–1)
BILIRUB SERPL-MCNC: 0.5 MG/DL (ref 0.2–1)
BILIRUB UR QL: NEGATIVE
BUN SERPL-MCNC: 28 MG/DL (ref 6–20)
BUN/CREAT SERPL: 30 (ref 12–20)
CALCIUM SERPL-MCNC: 9.2 MG/DL (ref 8.5–10.1)
CAOX CRY URNS QL MICRO: ABNORMAL
CHLORIDE SERPL-SCNC: 108 MMOL/L (ref 97–108)
CO2 SERPL-SCNC: 26 MMOL/L (ref 21–32)
COLOR UR: ABNORMAL
CREAT SERPL-MCNC: 0.93 MG/DL (ref 0.55–1.02)
DIFFERENTIAL METHOD BLD: ABNORMAL
EOSINOPHIL # BLD: 0.2 K/UL (ref 0–0.4)
EOSINOPHIL NFR BLD: 2 % (ref 0–7)
EPITH CASTS URNS QL MICRO: ABNORMAL /LPF
ERYTHROCYTE [DISTWIDTH] IN BLOOD BY AUTOMATED COUNT: 14.4 % (ref 11.5–14.5)
EST. AVERAGE GLUCOSE BLD GHB EST-MCNC: 103 MG/DL
GLOBULIN SER CALC-MCNC: 3.2 G/DL (ref 2–4)
GLUCOSE SERPL-MCNC: 63 MG/DL (ref 65–100)
GLUCOSE UR STRIP.AUTO-MCNC: NEGATIVE MG/DL
HBA1C MFR BLD: 5.2 % (ref 4–5.6)
HCT VFR BLD AUTO: 49.1 % (ref 35–47)
HGB BLD-MCNC: 15.2 G/DL (ref 11.5–16)
HGB UR QL STRIP: NEGATIVE
IMM GRANULOCYTES # BLD AUTO: 0 K/UL (ref 0–0.04)
IMM GRANULOCYTES NFR BLD AUTO: 0 % (ref 0–0.5)
KETONES UR QL STRIP.AUTO: NEGATIVE MG/DL
LEUKOCYTE ESTERASE UR QL STRIP.AUTO: ABNORMAL
LYMPHOCYTES # BLD: 3.5 K/UL (ref 0.8–3.5)
LYMPHOCYTES NFR BLD: 36 % (ref 12–49)
MCH RBC QN AUTO: 26.9 PG (ref 26–34)
MCHC RBC AUTO-ENTMCNC: 31 G/DL (ref 30–36.5)
MCV RBC AUTO: 86.7 FL (ref 80–99)
MONOCYTES # BLD: 1 K/UL (ref 0–1)
MONOCYTES NFR BLD: 10 % (ref 5–13)
NEUTS SEG # BLD: 5 K/UL (ref 1.8–8)
NEUTS SEG NFR BLD: 51 % (ref 32–75)
NITRITE UR QL STRIP.AUTO: POSITIVE
NRBC # BLD: 0 K/UL (ref 0–0.01)
NRBC BLD-RTO: 0 PER 100 WBC
PH UR STRIP: 6 (ref 5–8)
PLATELET # BLD AUTO: 267 K/UL (ref 150–400)
PMV BLD AUTO: 12.1 FL (ref 8.9–12.9)
POTASSIUM SERPL-SCNC: 3.2 MMOL/L (ref 3.5–5.1)
PROT SERPL-MCNC: 7 G/DL (ref 6.4–8.2)
PROT UR STRIP-MCNC: ABNORMAL MG/DL
RBC # BLD AUTO: 5.66 M/UL (ref 3.8–5.2)
RBC #/AREA URNS HPF: ABNORMAL /HPF (ref 0–5)
SODIUM SERPL-SCNC: 141 MMOL/L (ref 136–145)
SP GR UR REFRACTOMETRY: 1.02 (ref 1–1.03)
URINE CULTURE IF INDICATED: ABNORMAL
UROBILINOGEN UR QL STRIP.AUTO: 0.2 EU/DL (ref 0.2–1)
WBC # BLD AUTO: 9.7 K/UL (ref 3.6–11)
WBC URNS QL MICRO: ABNORMAL /HPF (ref 0–4)

## 2024-09-06 NOTE — PROGRESS NOTES
Dianne Powers is a 78 y.o. female     Chief Complaint   Patient presents with    6 Month Follow-Up       Issue with fingertip on ring finger (R hand);    /68 (Site: Right Upper Arm, Position: Sitting, Cuff Size: Small Adult)   Pulse 61   Temp 98 °F (36.7 °C) (Temporal)   Resp 16   Ht 1.613 m (5' 3.5\")   Wt 51.6 kg (113 lb 12.8 oz)   SpO2 99%   BMI 19.84 kg/m²     Health Maintenance Due   Topic Date Due    Respiratory Syncytial Virus (RSV) Pregnant or age 60 yrs+ (1 - 1-dose 60+ series) Never done    Flu vaccine (1) 08/01/2024    COVID-19 Vaccine (4 - 2023-24 season) 09/01/2024         \"Have you been to the ER, urgent care clinic since your last visit?  Hospitalized since your last visit?\"    NO    “Have you seen or consulted any other health care providers outside of [facility] since your last visit?”    NO                   
extremity edema  GI:    negative for nausea, vomiting, diarrhea, abdominal pain,melena  Endo:               negative for polyuria,polydipsia,polyphagia,heat intolerance  Genitourinary : negative for frequency, dysuria and hematuria  Integumentary: negative for rash and pruritus  Hematologic:   negative for easy bruising and gum/nose bleeding  Musculoskel:  negative for myalgias, arthralgias, back pain, muscle weakness  Neurological:   negative for headaches, dizziness, vertigo, memory problems and gait   Behavl/Psych:  negative for feelings of anxiety, depression, mood changes  ROS otherwise negative      Objective:  /68 (Site: Right Upper Arm, Position: Sitting, Cuff Size: Small Adult)   Pulse 61   Temp 98 °F (36.7 °C) (Temporal)   Resp 16   Ht 1.613 m (5' 3.5\")   Wt 51.6 kg (113 lb 12.8 oz)   SpO2 99%   BMI 19.84 kg/m²   Physical Exam:   General appearance - alert, well appearing, and in no distress  Mental status - alert, oriented to person, place, and time  EYE-NIRMAL, EOMI, fundi normal, corneas normal, no foreign bodies  ENT-ENT exam normal, no neck nodes or sinus tenderness  Nose - normal and patent, no erythema, discharge or polyps  Mouth - mucous membranes moist, pharynx normal without lesions  Neck - supple, no significant adenopathy   Chest - clear to auscultation, no wheezes, rales or rhonchi, symmetric air entry   Heart - normal rate, regular rhythm, normal S1, S2, no murmurs, rubs, clicks or gallops   Abdomen - soft, nontender, nondistended, no masses or organomegaly  Lymph- no adenopathy palpable  Ext-peripheral pulses normal, no pedal edema, no clubbing or cyanosis  Skin-Warm and dry. no hyperpigmentation, vitiligo, or suspicious lesions  Neuro -alert, oriented, normal speech, no focal findings or movement disorder noted      Assessment/Plan:  Dianne was seen today for 6 month follow-up.    Diagnoses and all orders for this visit:    Hypertension, benign  -     Comprehensive Metabolic

## 2024-09-07 LAB
BACTERIA SPEC CULT: NORMAL
CC UR VC: NORMAL
SERVICE CMNT-IMP: NORMAL

## 2024-10-09 RX ORDER — ATENOLOL 50 MG/1
50 TABLET ORAL 2 TIMES DAILY
Qty: 180 TABLET | Refills: 1 | Status: SHIPPED | OUTPATIENT
Start: 2024-10-09

## 2025-01-07 DIAGNOSIS — G30.1 MODERATE LATE ONSET ALZHEIMER'S DEMENTIA WITH AGITATION (HCC): ICD-10-CM

## 2025-01-07 DIAGNOSIS — F02.B11 MODERATE LATE ONSET ALZHEIMER'S DEMENTIA WITH AGITATION (HCC): ICD-10-CM

## 2025-01-08 RX ORDER — DONEPEZIL HYDROCHLORIDE 10 MG/1
10 TABLET, FILM COATED ORAL NIGHTLY
Qty: 90 TABLET | Refills: 3 | Status: SHIPPED | OUTPATIENT
Start: 2025-01-08

## 2025-01-08 RX ORDER — MEMANTINE HYDROCHLORIDE 10 MG/1
10 TABLET ORAL 2 TIMES DAILY
Qty: 180 TABLET | Refills: 1 | Status: SHIPPED | OUTPATIENT
Start: 2025-01-08

## 2025-01-08 NOTE — TELEPHONE ENCOUNTER
Last office visit  7/1//2024  Next office visit  1/21/2025  Last med refill  Donepezil 1/31/2024  Memantine 7/11/2024

## 2025-01-21 ENCOUNTER — OFFICE VISIT (OUTPATIENT)
Age: 80
End: 2025-01-21
Payer: MEDICARE

## 2025-01-21 VITALS
HEART RATE: 58 BPM | DIASTOLIC BLOOD PRESSURE: 88 MMHG | WEIGHT: 115.6 LBS | HEIGHT: 64 IN | BODY MASS INDEX: 19.74 KG/M2 | RESPIRATION RATE: 18 BRPM | SYSTOLIC BLOOD PRESSURE: 130 MMHG | OXYGEN SATURATION: 98 %

## 2025-01-21 DIAGNOSIS — F02.B11 MODERATE LATE ONSET ALZHEIMER'S DEMENTIA WITH AGITATION (HCC): Primary | ICD-10-CM

## 2025-01-21 DIAGNOSIS — G30.1 MODERATE LATE ONSET ALZHEIMER'S DEMENTIA WITH AGITATION (HCC): Primary | ICD-10-CM

## 2025-01-21 DIAGNOSIS — R32 URINARY INCONTINENCE, UNSPECIFIED TYPE: ICD-10-CM

## 2025-01-21 PROCEDURE — 99214 OFFICE O/P EST MOD 30 MIN: CPT | Performed by: NURSE PRACTITIONER

## 2025-01-21 PROCEDURE — 1159F MED LIST DOCD IN RCRD: CPT | Performed by: NURSE PRACTITIONER

## 2025-01-21 PROCEDURE — 1036F TOBACCO NON-USER: CPT | Performed by: NURSE PRACTITIONER

## 2025-01-21 PROCEDURE — 0509F URINE INCON PLAN DOCD: CPT | Performed by: NURSE PRACTITIONER

## 2025-01-21 PROCEDURE — 1123F ACP DISCUSS/DSCN MKR DOCD: CPT | Performed by: NURSE PRACTITIONER

## 2025-01-21 PROCEDURE — 1126F AMNT PAIN NOTED NONE PRSNT: CPT | Performed by: NURSE PRACTITIONER

## 2025-01-21 PROCEDURE — 3079F DIAST BP 80-89 MM HG: CPT | Performed by: NURSE PRACTITIONER

## 2025-01-21 PROCEDURE — G8399 PT W/DXA RESULTS DOCUMENT: HCPCS | Performed by: NURSE PRACTITIONER

## 2025-01-21 PROCEDURE — 3075F SYST BP GE 130 - 139MM HG: CPT | Performed by: NURSE PRACTITIONER

## 2025-01-21 PROCEDURE — 1160F RVW MEDS BY RX/DR IN RCRD: CPT | Performed by: NURSE PRACTITIONER

## 2025-01-21 PROCEDURE — G8420 CALC BMI NORM PARAMETERS: HCPCS | Performed by: NURSE PRACTITIONER

## 2025-01-21 PROCEDURE — G8427 DOCREV CUR MEDS BY ELIG CLIN: HCPCS | Performed by: NURSE PRACTITIONER

## 2025-01-21 PROCEDURE — 1090F PRES/ABSN URINE INCON ASSESS: CPT | Performed by: NURSE PRACTITIONER

## 2025-01-21 RX ORDER — ESCITALOPRAM OXALATE 10 MG/1
10 TABLET ORAL DAILY
Qty: 30 TABLET | Refills: 5 | Status: SHIPPED | OUTPATIENT
Start: 2025-01-21

## 2025-01-21 ASSESSMENT — PATIENT HEALTH QUESTIONNAIRE - PHQ9
1. LITTLE INTEREST OR PLEASURE IN DOING THINGS: NOT AT ALL
SUM OF ALL RESPONSES TO PHQ QUESTIONS 1-9: 0
2. FEELING DOWN, DEPRESSED OR HOPELESS: NOT AT ALL
SUM OF ALL RESPONSES TO PHQ QUESTIONS 1-9: 0
SUM OF ALL RESPONSES TO PHQ9 QUESTIONS 1 & 2: 0

## 2025-01-21 NOTE — PROGRESS NOTES
Chief Complaint   Patient presents with    Dementia     Follow up - per daughter - has gone really down hill - constant incontinence     Agitation     Gets very upset with self - daughter questions increasing lexapro    Going to [facility] and now using a walker  1. Have you been to the ER, urgent care clinic since your last visit?  Hospitalized since your last visit? Yes [facility] in [place] for hallucinations     2. Have you seen or consulted any other health care providers outside of the [facility] since your last visit?  Include any pap smears or colon screening.  Yes see above

## 2025-01-21 NOTE — PROGRESS NOTES
[facility]  [address]  [address]  [place] [address]  Tel: [phone]  Fax: [phone]      Date:  25     Name:  TIARA ARAIZA  :  1945  MRN:  [id number]     PCP:  JAMIE Madison MD    Chief Complaint   Patient presents with    Dementia     Follow up - per daughter - has gone really down hill - constant incontinence     Agitation     Gets very upset with self - daughter questions increasing lexapro        HISTORY OF PRESENT ILLNESS:  Patient presents today for regular follow-up, previously seen 2024 for progressive memory loss.  Patient has completed neuropsych testing that noted to moderate late onset Alzheimer's dementia with agitation.  She is here today with her daughter Abby who is her main caregiver.  She notes that since last seen she has really gone downhill.  She is suffering from regular incontinence as well as she gets very agitated and upset with herself.  Patient is taking Lexapro, the daughter wonders if the dosage could be increased.      They moved into 1 level story, 6 months ago.  Abby was hoping things would start improving but they have not.  She has been having a lot more incontinence.  Patient was evaluated in the emergency room in November, for fear of UTIs patient was having some hallucinations.  Labs were done as well as head CT, no abnormalities noted.  She continues to have issues with incontinence, wearing there is pads or depends.  Daughter helps with medications, she checks behind her to make sure she is taking them.  She attends and adult  5 days a week.     Recap from last visit :   1.  Moderate late onset Alzheimer's dementia with agitation: Patient is to continue Namenda 10 mg, 1 p.o. twice a day as well as Aricept 10 mg nightly.  Safety and side effects medication discussed.  Patient has completed neuropsych testing, this was done with Dr. Munguia 2024, there are no safety or behavior concerns at

## 2025-03-17 ENCOUNTER — OFFICE VISIT (OUTPATIENT)
Facility: CLINIC | Age: 80
End: 2025-03-17
Payer: MEDICARE

## 2025-03-17 VITALS
HEIGHT: 64 IN | TEMPERATURE: 97.5 F | RESPIRATION RATE: 16 BRPM | WEIGHT: 118 LBS | DIASTOLIC BLOOD PRESSURE: 60 MMHG | SYSTOLIC BLOOD PRESSURE: 112 MMHG | BODY MASS INDEX: 20.14 KG/M2

## 2025-03-17 DIAGNOSIS — N39.41 URGE INCONTINENCE OF URINE: ICD-10-CM

## 2025-03-17 DIAGNOSIS — F02.B11 MODERATE LATE ONSET ALZHEIMER'S DEMENTIA WITH AGITATION (HCC): ICD-10-CM

## 2025-03-17 DIAGNOSIS — I10 HYPERTENSION, BENIGN: ICD-10-CM

## 2025-03-17 DIAGNOSIS — G30.9 ALZHEIMER'S DISEASE, UNSPECIFIED (CODE): ICD-10-CM

## 2025-03-17 DIAGNOSIS — Z00.00 MEDICARE ANNUAL WELLNESS VISIT, SUBSEQUENT: Primary | ICD-10-CM

## 2025-03-17 DIAGNOSIS — R73.02 IGT (IMPAIRED GLUCOSE TOLERANCE): ICD-10-CM

## 2025-03-17 DIAGNOSIS — G30.1 MODERATE LATE ONSET ALZHEIMER'S DEMENTIA WITH AGITATION (HCC): ICD-10-CM

## 2025-03-17 DIAGNOSIS — N18.30 STAGE 3 CHRONIC KIDNEY DISEASE, UNSPECIFIED WHETHER STAGE 3A OR 3B CKD (HCC): ICD-10-CM

## 2025-03-17 LAB
ALBUMIN SERPL-MCNC: 3.2 G/DL (ref 3.5–5)
ALBUMIN/GLOB SERPL: 1.1 (ref 1.1–2.2)
ALP SERPL-CCNC: 107 U/L (ref 45–117)
ALT SERPL-CCNC: 18 U/L (ref 12–78)
ANION GAP SERPL CALC-SCNC: 6 MMOL/L (ref 2–12)
AST SERPL-CCNC: 18 U/L (ref 15–37)
BASOPHILS # BLD: 0.06 K/UL (ref 0–0.1)
BASOPHILS NFR BLD: 0.9 % (ref 0–1)
BILIRUB SERPL-MCNC: 0.7 MG/DL (ref 0.2–1)
BUN SERPL-MCNC: 24 MG/DL (ref 6–20)
BUN/CREAT SERPL: 24 (ref 12–20)
CALCIUM SERPL-MCNC: 9 MG/DL (ref 8.5–10.1)
CHLORIDE SERPL-SCNC: 108 MMOL/L (ref 97–108)
CHOLEST SERPL-MCNC: 161 MG/DL
CO2 SERPL-SCNC: 25 MMOL/L (ref 21–32)
CREAT SERPL-MCNC: 0.98 MG/DL (ref 0.55–1.02)
DIFFERENTIAL METHOD BLD: ABNORMAL
EOSINOPHIL # BLD: 0.11 K/UL (ref 0–0.4)
EOSINOPHIL NFR BLD: 1.6 % (ref 0–7)
ERYTHROCYTE [DISTWIDTH] IN BLOOD BY AUTOMATED COUNT: 14.6 % (ref 11.5–14.5)
EST. AVERAGE GLUCOSE BLD GHB EST-MCNC: 108 MG/DL
GLOBULIN SER CALC-MCNC: 3 G/DL (ref 2–4)
GLUCOSE SERPL-MCNC: 79 MG/DL (ref 65–100)
HBA1C MFR BLD: 5.4 % (ref 4–5.6)
HCT VFR BLD AUTO: 42.3 % (ref 35–47)
HDLC SERPL-MCNC: 35 MG/DL
HDLC SERPL: 4.6 (ref 0–5)
HGB BLD-MCNC: 13.3 G/DL (ref 11.5–16)
IMM GRANULOCYTES # BLD AUTO: 0.01 K/UL (ref 0–0.04)
IMM GRANULOCYTES NFR BLD AUTO: 0.1 % (ref 0–0.5)
LDLC SERPL CALC-MCNC: 94 MG/DL (ref 0–100)
LYMPHOCYTES # BLD: 2.86 K/UL (ref 0.8–3.5)
LYMPHOCYTES NFR BLD: 40.9 % (ref 12–49)
MCH RBC QN AUTO: 26.4 PG (ref 26–34)
MCHC RBC AUTO-ENTMCNC: 31.4 G/DL (ref 30–36.5)
MCV RBC AUTO: 83.9 FL (ref 80–99)
MONOCYTES # BLD: 0.6 K/UL (ref 0–1)
MONOCYTES NFR BLD: 8.6 % (ref 5–13)
NEUTS SEG # BLD: 3.35 K/UL (ref 1.8–8)
NEUTS SEG NFR BLD: 47.9 % (ref 32–75)
NRBC # BLD: 0 K/UL (ref 0–0.01)
NRBC BLD-RTO: 0 PER 100 WBC
PLATELET # BLD AUTO: 251 K/UL (ref 150–400)
PMV BLD AUTO: 11.4 FL (ref 8.9–12.9)
POTASSIUM SERPL-SCNC: 4.1 MMOL/L (ref 3.5–5.1)
PROT SERPL-MCNC: 6.2 G/DL (ref 6.4–8.2)
RBC # BLD AUTO: 5.04 M/UL (ref 3.8–5.2)
SODIUM SERPL-SCNC: 139 MMOL/L (ref 136–145)
TRIGL SERPL-MCNC: 160 MG/DL
VLDLC SERPL CALC-MCNC: 32 MG/DL
WBC # BLD AUTO: 7 K/UL (ref 3.6–11)

## 2025-03-17 PROCEDURE — 1159F MED LIST DOCD IN RCRD: CPT | Performed by: INTERNAL MEDICINE

## 2025-03-17 PROCEDURE — 3078F DIAST BP <80 MM HG: CPT | Performed by: INTERNAL MEDICINE

## 2025-03-17 PROCEDURE — 3074F SYST BP LT 130 MM HG: CPT | Performed by: INTERNAL MEDICINE

## 2025-03-17 PROCEDURE — 1126F AMNT PAIN NOTED NONE PRSNT: CPT | Performed by: INTERNAL MEDICINE

## 2025-03-17 PROCEDURE — 1123F ACP DISCUSS/DSCN MKR DOCD: CPT | Performed by: INTERNAL MEDICINE

## 2025-03-17 PROCEDURE — G0439 PPPS, SUBSEQ VISIT: HCPCS | Performed by: INTERNAL MEDICINE

## 2025-03-17 RX ORDER — TROSPIUM CHLORIDE 20 MG/1
20 TABLET, FILM COATED ORAL 2 TIMES DAILY
Qty: 60 TABLET | Refills: 3 | Status: SHIPPED | OUTPATIENT
Start: 2025-03-17

## 2025-03-17 RX ORDER — ATENOLOL 50 MG/1
50 TABLET ORAL 2 TIMES DAILY
Qty: 180 TABLET | Refills: 1 | Status: SHIPPED | OUTPATIENT
Start: 2025-03-17

## 2025-03-17 RX ORDER — DONEPEZIL HYDROCHLORIDE 10 MG/1
10 TABLET, FILM COATED ORAL NIGHTLY
Qty: 90 TABLET | Refills: 3 | Status: SHIPPED | OUTPATIENT
Start: 2025-03-17

## 2025-03-17 RX ORDER — MEMANTINE HYDROCHLORIDE 10 MG/1
10 TABLET ORAL 2 TIMES DAILY
Qty: 180 TABLET | Refills: 1 | Status: SHIPPED | OUTPATIENT
Start: 2025-03-17

## 2025-03-17 RX ORDER — ESCITALOPRAM OXALATE 10 MG/1
10 TABLET ORAL DAILY
Qty: 30 TABLET | Refills: 5 | Status: SHIPPED | OUTPATIENT
Start: 2025-03-17

## 2025-03-17 SDOH — ECONOMIC STABILITY: FOOD INSECURITY: WITHIN THE PAST 12 MONTHS, THE FOOD YOU BOUGHT JUST DIDN'T LAST AND YOU DIDN'T HAVE MONEY TO GET MORE.: NEVER TRUE

## 2025-03-17 SDOH — ECONOMIC STABILITY: FOOD INSECURITY: WITHIN THE PAST 12 MONTHS, YOU WORRIED THAT YOUR FOOD WOULD RUN OUT BEFORE YOU GOT MONEY TO BUY MORE.: NEVER TRUE

## 2025-03-17 ASSESSMENT — PATIENT HEALTH QUESTIONNAIRE - PHQ9
1. LITTLE INTEREST OR PLEASURE IN DOING THINGS: NOT AT ALL
SUM OF ALL RESPONSES TO PHQ QUESTIONS 1-9: 0
2. FEELING DOWN, DEPRESSED OR HOPELESS: NOT AT ALL
SUM OF ALL RESPONSES TO PHQ QUESTIONS 1-9: 0

## 2025-03-17 ASSESSMENT — LIFESTYLE VARIABLES
HOW MANY STANDARD DRINKS CONTAINING ALCOHOL DO YOU HAVE ON A TYPICAL DAY: PATIENT DOES NOT DRINK
HOW OFTEN DO YOU HAVE A DRINK CONTAINING ALCOHOL: NEVER

## 2025-03-17 ASSESSMENT — ANXIETY QUESTIONNAIRES
5. BEING SO RESTLESS THAT IT IS HARD TO SIT STILL: NOT AT ALL
IF YOU CHECKED OFF ANY PROBLEMS ON THIS QUESTIONNAIRE, HOW DIFFICULT HAVE THESE PROBLEMS MADE IT FOR YOU TO DO YOUR WORK, TAKE CARE OF THINGS AT HOME, OR GET ALONG WITH OTHER PEOPLE: NOT DIFFICULT AT ALL
GAD7 TOTAL SCORE: 0
1. FEELING NERVOUS, ANXIOUS, OR ON EDGE: NOT AT ALL
7. FEELING AFRAID AS IF SOMETHING AWFUL MIGHT HAPPEN: NOT AT ALL
6. BECOMING EASILY ANNOYED OR IRRITABLE: NOT AT ALL
2. NOT BEING ABLE TO STOP OR CONTROL WORRYING: NOT AT ALL
4. TROUBLE RELAXING: NOT AT ALL
3. WORRYING TOO MUCH ABOUT DIFFERENT THINGS: NOT AT ALL

## 2025-03-17 NOTE — PROGRESS NOTES
Medicare Annual Wellness Visit    Dianne Powers is here for Medicare AWV    Assessment & Plan   Medicare annual wellness visit, subsequent  Moderate late onset Alzheimer's dementia with agitation (HCC)  -     memantine (NAMENDA) 10 MG tablet; Take 1 tablet by mouth 2 times daily, Disp-180 tablet, R-1Normal  -     escitalopram (LEXAPRO) 10 MG tablet; Take 1 tablet by mouth daily, Disp-30 tablet, R-5Normal  Hypertension, benign  -     CBC with Auto Differential; Future  -     Comprehensive Metabolic Panel; Future  -     Hemoglobin A1C; Future  -     Lipid Panel; Future  -     Urinalysis; Future  IGT (impaired glucose tolerance)  -     CBC with Auto Differential; Future  -     Comprehensive Metabolic Panel; Future  -     Hemoglobin A1C; Future  -     Lipid Panel; Future  -     Urinalysis; Future  Alzheimer's disease, unspecified (CODE) (HCC)  -     CBC with Auto Differential; Future  -     Comprehensive Metabolic Panel; Future  -     Hemoglobin A1C; Future  -     Lipid Panel; Future  -     Urinalysis; Future  Stage 3 chronic kidney disease, unspecified whether stage 3a or 3b CKD (HCC)  -     CBC with Auto Differential; Future  -     Comprehensive Metabolic Panel; Future  -     Hemoglobin A1C; Future  -     Lipid Panel; Future  -     Urinalysis; Future  Urge incontinence of urine       Return in 6 months (on 9/17/2025).     Subjective       Patient's complete Health Risk Assessment and screening values have been reviewed and are found in Flowsheets. The following problems were reviewed today and where indicated follow up appointments were made and/or referrals ordered.    Positive Risk Factor Screenings with Interventions:     Cognitive:   Clock Drawing Test (CDT): (!) Abnormal  Words recalled: 0 Words Recalled  Total Score: (!) 0  Total Score Interpretation: Abnormal Mini-Cog  Interventions:  See A/P for plan and any pertinent orders            Inactivity:  On average, how many days per week do you engage in

## 2025-03-17 NOTE — PATIENT INSTRUCTIONS
home?  Keeping your body active can help slow MCI. Exercises like walking can help. Try to stay active mentally too. Read or do things like crossword puzzles if you enjoy doing them.  If you need help coping with MCI, you may want to get support from family, friends, a support group, or a counselor who works with people who have MCI.  Though the future isn't always clear, it can be good to plan ahead with instructions for your care. These are called advanced directives. Having a plan can help make sure that you get the care you want.  Current as of: December 3, 2024  Content Version: 14.4  © 2024-2025 [employer].   Care instructions adapted under license by [employer]. If you have questions about a medical condition or this instruction, always ask your healthcare professional. [employer], disclaims any warranty or liability for your use of this information.         Learning About Being Active as an Older Adult  Why is being active important as you get older?     Being active is one of the best things you can do for your health. And it's never too late to start. Being active--or getting active, if you aren't already--has definite benefits. It can:  Give you more energy,  Keep your mind sharp.  Improve balance to reduce your risk of falls.  Help you manage chronic illness with fewer medicines.  No matter how old you are, how fit you are, or what health problems you have, there is a form of activity that will work for you. And the more physical activity you can do, the better your overall health will be.  What kinds of activity can help you stay healthy?  Being more active will make your daily activities easier. Physical activity includes planned exercise and things you do in daily life. There are four types of activity:  Aerobic.  Doing aerobic activity makes your heart and lungs strong.  Includes walking, dancing, and gardening.  Aim for at least 2½ hours spread throughout the week.  It

## 2025-03-17 NOTE — PROGRESS NOTES
Dianne Powers is a 79 y.o. female     Chief Complaint   Patient presents with    Medicare AWV       /60 (BP Site: Left Upper Arm, Patient Position: Sitting, BP Cuff Size: Small Adult)   Temp 97.5 °F (36.4 °C) (Temporal)   Resp 16   Ht 1.613 m (5' 3.5\")   Wt 53.5 kg (118 lb)   BMI 20.57 kg/m²     Health Maintenance Due   Topic Date Due    Respiratory Syncytial Virus (RSV) Pregnant or age 60 yrs+ (1 - 1-dose 75+ series) Never done    Flu vaccine (1) 08/01/2024    COVID-19 Vaccine (4 - 2024-25 season) 09/01/2024    Annual Wellness Visit (Medicare)  03/13/2025         \"Have you been to the ER, urgent care clinic since your last visit?  Hospitalized since your last visit?\"    11/12/24; [facility]    “Have you seen or consulted any other health care providers outside of [facility] since your last visit?”    NO

## 2025-04-29 ENCOUNTER — TELEPHONE (OUTPATIENT)
Age: 80
End: 2025-04-29

## 2025-04-29 NOTE — TELEPHONE ENCOUNTER
Patient's daughter has found a bed at [facility] and rehabilitation. Patient will need a few things before she can be accepted at nursing home. They will need H&P, TB testing, and Chest xray. Patient completed AWV on 3/17/25. Patient's daughter is wondering if she could used that physical. Scheduled for 4/30/25 to discuss nursing home.  [phone]

## 2025-04-30 ENCOUNTER — HOSPITAL ENCOUNTER (OUTPATIENT)
Facility: HOSPITAL | Age: 80
Discharge: HOME | End: 2025-05-03
Payer: MEDICARE

## 2025-04-30 ENCOUNTER — OFFICE VISIT (OUTPATIENT)
Facility: CLINIC | Age: 80
End: 2025-04-30
Payer: MEDICARE

## 2025-04-30 VITALS
HEART RATE: 68 BPM | OXYGEN SATURATION: 78 % | DIASTOLIC BLOOD PRESSURE: 64 MMHG | SYSTOLIC BLOOD PRESSURE: 116 MMHG | RESPIRATION RATE: 17 BRPM | BODY MASS INDEX: 19.36 KG/M2 | TEMPERATURE: 97.4 F | WEIGHT: 113.4 LBS | HEIGHT: 64 IN

## 2025-04-30 DIAGNOSIS — Z11.1 SCREENING-PULMONARY TB: ICD-10-CM

## 2025-04-30 DIAGNOSIS — R73.02 IGT (IMPAIRED GLUCOSE TOLERANCE): ICD-10-CM

## 2025-04-30 DIAGNOSIS — I10 HYPERTENSION, BENIGN: Primary | ICD-10-CM

## 2025-04-30 DIAGNOSIS — G30.9 ALZHEIMER'S DISEASE, UNSPECIFIED (CODE) (HCC): ICD-10-CM

## 2025-04-30 DIAGNOSIS — N39.41 URGE INCONTINENCE OF URINE: ICD-10-CM

## 2025-04-30 PROCEDURE — 1126F AMNT PAIN NOTED NONE PRSNT: CPT | Performed by: INTERNAL MEDICINE

## 2025-04-30 PROCEDURE — 1159F MED LIST DOCD IN RCRD: CPT | Performed by: INTERNAL MEDICINE

## 2025-04-30 PROCEDURE — 1123F ACP DISCUSS/DSCN MKR DOCD: CPT | Performed by: INTERNAL MEDICINE

## 2025-04-30 PROCEDURE — 3078F DIAST BP <80 MM HG: CPT | Performed by: INTERNAL MEDICINE

## 2025-04-30 PROCEDURE — 3074F SYST BP LT 130 MM HG: CPT | Performed by: INTERNAL MEDICINE

## 2025-04-30 PROCEDURE — 71046 X-RAY EXAM CHEST 2 VIEWS: CPT

## 2025-04-30 PROCEDURE — 99214 OFFICE O/P EST MOD 30 MIN: CPT | Performed by: INTERNAL MEDICINE

## 2025-04-30 NOTE — PROGRESS NOTES
Dianne Powers is a 79 y.o. female and presents with [facility]  .    Subjective:    Mrs. Powers presents today for medical evaluation prior to entering skilled nursing/memory care.  Her history significant for hypertension, impaired glucose tolerance, Alzheimer's dementia, osteoporosis and urinary incontinence.  She has a history of mild renal insufficiency however this has resolved and her renal function has been stable.  She remains on Tenormin 50 mg twice daily for hypertension.  Her memory has progressively declined despite Namenda 10 mg twice daily and donepezil 10 mg nightly.  She remains on Lexapro 10 mg daily as well.  Her daughter notes that she seems to do well when she is in a social environment and becomes withdrawn when she is alone.  She remains on trospium for urinary incontinence.  Past Medical History:   Diagnosis Date    Age-related osteoporosis without current pathological fracture 7/11/2019    Cataract 12/11/2017    Chronic kidney insufficiency 12/11/2017    Depression 12/11/2017    Dyslipidemia 12/11/2017    Fatigue 12/11/2017    GERD (gastroesophageal reflux disease)     Glaucoma 12/11/2017    Gout 12/11/2017    Hypertension     Hypertension, benign 12/11/2017    IGT (impaired glucose tolerance) 1/9/2019    On statin therapy 12/11/2017    Peripheral arterial disease 1/17/2020    Status post stent right SFA    Pre-ulcerative corn or callous 12/11/2017    Right foot injury 12/11/2017    Statin intolerance 2/25/2022     Past Surgical History:   Procedure Laterality Date    COLON CA SCRN NOT HI RSK IND  1/6/2017         COLONOSCOPY N/A 1/6/2017    COLONOSCOPY performed by Zacarias Dinero Jr., MD at \[facility]\"" ENDOSCOPY    HEENT      eye surgery     Allergies   Allergen Reactions    Diclofenac      Other reaction(s): Unknown (comments)  Patient reports she is not allergic    Rosuvastatin Myalgia     Current Outpatient Medications   Medication Sig Dispense Refill    memantine

## 2025-04-30 NOTE — PROGRESS NOTES
Dianne Powers is a 79 y.o. female     Chief Complaint   Patient presents with    Discuss Nursing Home       /64 (BP Site: Left Upper Arm, Patient Position: Sitting, BP Cuff Size: Small Adult)   Pulse 68   Temp 97.4 °F (36.3 °C) (Temporal)   Resp 17   Ht 1.613 m (5' 3.5\")   Wt 51.4 kg (113 lb 6.4 oz)   SpO2 (!) 78%   BMI 19.77 kg/m²     Health Maintenance Due   Topic Date Due    Respiratory Syncytial Virus (RSV) Pregnant or age 60 yrs+ (1 - 1-dose 75+ series) Never done    COVID-19 Vaccine (4 - 2024-25 season) 09/01/2024         \"Have you been to the ER, urgent care clinic since your last visit?  Hospitalized since your last visit?\"    NO    “Have you seen or consulted any other health care providers outside of [facility] since your last visit?”    NO

## 2025-05-01 NOTE — TELEPHONE ENCOUNTER
Office visit dated 3/17/25 & 4/30/25 with x-ray results dated 4/30/25 was faxed to the attn: Jennifer Iniguez fax #[phone].  Confirmation received.     Daughter confirmed the fax number and who's attention to send the information to.    PHYSICAL THERAPY - DAILY TREATMENT NOTE    Patient Name: Amarilis Sullivan        Date: 2017  : 1949   YES Patient  Verified  Visit #:      of     Insurance: Payor: Cary Freedman / Plan: [place] MED[place] PART [place] & B / Product Type: Medicare /      In time: 11:00 Out time: 12:08   Total Treatment Time: 68     Medicare Time Tracking (below)   Total Timed Codes (min):  68 1:1 Treatment Time:  68     TREATMENT AREA =  Bilateral hip pain [M25.551, M25.552]  Acute postoperative pain of hip [M25.559, G89.18]  Low back pain [M54.5]    SUBJECTIVE    Pain Level (on 0 to 10 scale):  0  / 10   Medication Changes/New allergies or changes in medical history, any new surgeries or procedures? NO     If yes, update Summary List   Subjective Functional Status/Changes:  []  No changes reported     \"I feel like I've plateaued. I'm not getting worse, its just certain things that aren't getting any easier. Trying to cut my toenails and picking things up off the floor. Those things are still hard. \"          OBJECTIVE      33 min Therapeutic Exercise:  [x]  See flow sheet   Rationale:      increase ROM and increase strength to improve the patients ability to perform ADL's with improved core stability and hip girdle MMT. 25 min Therapeutic Activity: Lateral ambulation, relative SLS with hip 3-way, step-up's. Rationale: To increase safety and efficiency with ADL's. 10 min Manual Therapy: R inominate upshift correction, L over R assisted MET   Rationale:      decrease pain, increase ROM and increase tissue extensibility to improve patient's ability to perform ADL's with an improved neutral spine. min Patient Education:  YES  Reviewed HEP   []  Progressed/Changed HEP based on:   Patient reports compliance     Other Objective/Functional Measures:    Pt had less pain with clams after manual interventions. Pt was able to perform 5 on the R with no increase in pain.  Self release of the piriformis also alleviated symptoms. Added crossbody piriformis stretch which targeted pt's symptoms more directly. Pt was unable to perform the box lift correctly this visit due to an inability to flex her knees low enough to approximate the box. Post Treatment Pain Level (on 0 to 10) scale:   0  / 10     ASSESSMENT    Assessment/Changes in Function:     Pt presents to PT progressing appropriately in hip girdle MMT and efficiency with box lifts. []  See Progress Note/Recertification   Patient will continue to benefit from skilled PT services to modify and progress therapeutic interventions, address functional mobility deficits, address ROM deficits, address strength deficits, analyze and address soft tissue restrictions, analyze and cue movement patterns, analyze and modify body mechanics/ergonomics and assess and modify postural abnormalities to attain remaining goals. Progress toward goals / Updated goals: Addressed LTG 4 with lateral ambulation.      PLAN    [x]  Upgrade activities as tolerated YES Continue plan of care   []  Discharge due to :    []  Other:      Therapist: Ayah Damico    Date: 8/18/2017 Time: 11:11 AM     Future Appointments  Date Time Provider Yocasta Loaiza   8/21/2017 10:00 AM [facility]   8/23/2017 8:30 AM [facility]   8/29/2017 10:00 AM [facility]   8/31/2017 10:00 AM Ayah Damico [facility]   10/11/2017 10:00 AM Hamilton Rao MD [facility]

## 2025-05-02 NOTE — TELEPHONE ENCOUNTER
Pt daughter, Abby is calling to inform they have not received the office notes and x ray results    Please refax to [phone] attn Jennifer

## 2025-05-05 ENCOUNTER — TELEPHONE (OUTPATIENT)
Age: 80
End: 2025-05-05

## 2025-05-05 NOTE — TELEPHONE ENCOUNTER
Patient's daughter was calling because they have been trying to get her a bed at the [facility], but the nursing home has not been receiving the fax. She wants to know if we can eFax it to [phone]. Abby (patient's daughter) would like a call back.  [phone].

## 2025-06-09 ENCOUNTER — TELEPHONE (OUTPATIENT)
Facility: CLINIC | Age: 80
End: 2025-06-09

## 2025-06-09 NOTE — TELEPHONE ENCOUNTER
----- Message from Efrain MARIVEL sent at 6/6/2025 10:55 AM EDT -----  Regarding: ECC Message to Provider  ECC Message to Provider    Relationship to Patient: Other Daughter Abby Long    Additional Information Caller is asking if she should book or cancel an appointment since the patient is currently at the nursing home.  --------------------------------------------------------------------------------------------------------------------------    Call Back Information: OK to leave message on voicemail  Preferred Call Back Number: Phone [phone]

## 2025-07-31 ENCOUNTER — OFFICE VISIT (OUTPATIENT)
Age: 80
End: 2025-07-31
Payer: MEDICARE

## 2025-07-31 VITALS
DIASTOLIC BLOOD PRESSURE: 82 MMHG | BODY MASS INDEX: 17.75 KG/M2 | WEIGHT: 104 LBS | SYSTOLIC BLOOD PRESSURE: 122 MMHG | RESPIRATION RATE: 18 BRPM | HEIGHT: 64 IN

## 2025-07-31 DIAGNOSIS — G30.1 MODERATE LATE ONSET ALZHEIMER'S DEMENTIA WITH AGITATION (HCC): Primary | ICD-10-CM

## 2025-07-31 DIAGNOSIS — F02.B11 MODERATE LATE ONSET ALZHEIMER'S DEMENTIA WITH AGITATION (HCC): Primary | ICD-10-CM

## 2025-07-31 PROCEDURE — 99214 OFFICE O/P EST MOD 30 MIN: CPT | Performed by: NURSE PRACTITIONER

## 2025-07-31 PROCEDURE — 1160F RVW MEDS BY RX/DR IN RCRD: CPT | Performed by: NURSE PRACTITIONER

## 2025-07-31 PROCEDURE — 1126F AMNT PAIN NOTED NONE PRSNT: CPT | Performed by: NURSE PRACTITIONER

## 2025-07-31 PROCEDURE — 1159F MED LIST DOCD IN RCRD: CPT | Performed by: NURSE PRACTITIONER

## 2025-07-31 PROCEDURE — 3074F SYST BP LT 130 MM HG: CPT | Performed by: NURSE PRACTITIONER

## 2025-07-31 PROCEDURE — 1123F ACP DISCUSS/DSCN MKR DOCD: CPT | Performed by: NURSE PRACTITIONER

## 2025-07-31 PROCEDURE — 3079F DIAST BP 80-89 MM HG: CPT | Performed by: NURSE PRACTITIONER

## 2025-07-31 ASSESSMENT — PATIENT HEALTH QUESTIONNAIRE - PHQ9
SUM OF ALL RESPONSES TO PHQ QUESTIONS 1-9: 0
1. LITTLE INTEREST OR PLEASURE IN DOING THINGS: NOT AT ALL
SUM OF ALL RESPONSES TO PHQ QUESTIONS 1-9: 0
2. FEELING DOWN, DEPRESSED OR HOPELESS: NOT AT ALL
SUM OF ALL RESPONSES TO PHQ QUESTIONS 1-9: 0
SUM OF ALL RESPONSES TO PHQ QUESTIONS 1-9: 0

## 2025-07-31 NOTE — PATIENT INSTRUCTIONS
Please have someone from the nursing home who is involved in your care attend the visit with you next time and or your daughter.    At this time I would recommend that you continue medications as prescribed.      I will see you back in 6 months.

## 2025-07-31 NOTE — PROGRESS NOTES
[facility]  [address]  [address]  [place] [address]  Tel: [phone]  Fax: [phone]      Date:  25     Name:  TIARA ARAIZA  :  1945  MRN:  [id number]     PCP:  JAMIE Madison MD    Chief Complaint   Patient presents with    Dementia     Lives at [facility] in [place] - family not with her - but staff member brought he - cannot give a birthday        HISTORY OF PRESENT ILLNESS:  History of Present Illness  The patient is an 79-year-old female here today for a regular follow-up appointment. The patient was last seen in 2025. She is routinely seen for late-onset Alzheimer's dementia with agitation.  The patient was continued on donepezil and Namenda, I did increase her Lexapro.   patient routinely attends the visits with her daughter, she is not here today.  Patient has been living at [facility] since May, she is here today with someone from the nursing home but is not clinical, and therefore unable to provide related information.  After the visit I did consult with her daughter via the telephone who notes that unfortunately her mother has really gone downhill.  She was doing the best she could taking care of her at home however she had a couple near falls, she was more confused  and more agitated, homehealth came out and had recommended getting more care into the home however she did not qualify for the assistance and they could not afford 24-hour care.  So she has been at the nursing home since May.  For the most part her mother (the patient) has done well there, they do have to place her medications in applesauce that she does not understand how to swallow them.  She does note that she is eating well, she does enjoy being at the home because she is around others.        Recap from last visit :   1.  Moderate late onset Alzheimer's dementia with agitation: Patient is to continue the donepezil 10 mg nightly as well as Namenda 10

## 2025-07-31 NOTE — PROGRESS NOTES
Chief Complaint   Patient presents with    Dementia     Lives at [facility] in [place] - family not with her - but staff member brought he - cannot give a birthday      1. Have you been to the ER, urgent care clinic since your last visit?  Hospitalized since your last visit?     2. Have you seen or consulted any other health care providers outside of the [facility] since your last visit?  Include any pap smears or colon screening.  ?

## 2025-08-07 ENCOUNTER — HOSPITAL ENCOUNTER (EMERGENCY)
Facility: HOSPITAL | Age: 80
Discharge: HOME | End: 2025-08-07
Attending: EMERGENCY MEDICINE
Payer: MEDICARE

## 2025-08-07 VITALS
TEMPERATURE: 97.3 F | OXYGEN SATURATION: 100 % | RESPIRATION RATE: 14 BRPM | HEART RATE: 63 BPM | SYSTOLIC BLOOD PRESSURE: 143 MMHG | DIASTOLIC BLOOD PRESSURE: 50 MMHG

## 2025-08-07 DIAGNOSIS — D64.9 ANEMIA, UNSPECIFIED TYPE: Primary | ICD-10-CM

## 2025-08-07 LAB
ABO + RH BLD: NORMAL
ALBUMIN SERPL-MCNC: 3.4 G/DL (ref 3.5–5.2)
ALBUMIN/GLOB SERPL: 1.2 (ref 1.1–2.2)
ALP SERPL-CCNC: 93 U/L (ref 35–104)
ALT SERPL-CCNC: 10 U/L (ref 10–35)
ANION GAP SERPL CALC-SCNC: 11 MMOL/L (ref 2–14)
APTT PPP: 21.9 SEC (ref 22.1–31)
AST SERPL-CCNC: 20 U/L (ref 10–35)
BASOPHILS # BLD: 0.07 K/UL (ref 0–0.1)
BASOPHILS NFR BLD: 0.9 % (ref 0–1)
BILIRUB SERPL-MCNC: 0.5 MG/DL (ref 0–1.2)
BLOOD GROUP ANTIBODIES SERPL: NORMAL
BUN SERPL-MCNC: 19 MG/DL (ref 8–23)
BUN/CREAT SERPL: 21 (ref 12–20)
CALCIUM SERPL-MCNC: 9.2 MG/DL (ref 8.8–10.2)
CHLORIDE SERPL-SCNC: 106 MMOL/L (ref 98–107)
CO2 SERPL-SCNC: 21 MMOL/L (ref 20–29)
COMMENT:: NORMAL
CREAT SERPL-MCNC: 0.89 MG/DL (ref 0.6–1)
DIFFERENTIAL METHOD BLD: ABNORMAL
EOSINOPHIL # BLD: 0.11 K/UL (ref 0–0.4)
EOSINOPHIL NFR BLD: 1.4 % (ref 0–7)
ERYTHROCYTE [DISTWIDTH] IN BLOOD BY AUTOMATED COUNT: 17.2 % (ref 11.5–14.5)
GLOBULIN SER CALC-MCNC: 2.8 G/DL (ref 2–4)
GLUCOSE SERPL-MCNC: 103 MG/DL (ref 65–100)
HCT VFR BLD AUTO: 25.4 % (ref 35–47)
HGB BLD-MCNC: 7.4 G/DL (ref 11.5–16)
IMM GRANULOCYTES # BLD AUTO: 0.02 K/UL (ref 0–0.04)
IMM GRANULOCYTES NFR BLD AUTO: 0.2 % (ref 0–0.5)
INR PPP: 1.1 (ref 0.9–1.1)
LYMPHOCYTES # BLD: 3.92 K/UL (ref 0.8–3.5)
LYMPHOCYTES NFR BLD: 48.4 % (ref 12–49)
MCH RBC QN AUTO: 20.9 PG (ref 26–34)
MCHC RBC AUTO-ENTMCNC: 29.1 G/DL (ref 30–36.5)
MCV RBC AUTO: 71.8 FL (ref 80–99)
MONOCYTES # BLD: 0.73 K/UL (ref 0–1)
MONOCYTES NFR BLD: 9 % (ref 5–13)
NEUTS SEG # BLD: 3.25 K/UL (ref 1.8–8)
NEUTS SEG NFR BLD: 40.1 % (ref 32–75)
NRBC # BLD: 0 K/UL (ref 0–0.01)
NRBC BLD-RTO: 0 PER 100 WBC
PLATELET # BLD AUTO: 465 K/UL (ref 150–400)
PMV BLD AUTO: 9.7 FL (ref 8.9–12.9)
POTASSIUM SERPL-SCNC: 3.7 MMOL/L (ref 3.5–5.1)
PROT SERPL-MCNC: 6.2 G/DL (ref 6.4–8.3)
PROTHROMBIN TIME: 12 SEC (ref 9.2–11.2)
RBC # BLD AUTO: 3.54 M/UL (ref 3.8–5.2)
SODIUM SERPL-SCNC: 138 MMOL/L (ref 136–145)
SPECIMEN EXP DATE BLD: NORMAL
SPECIMEN HOLD: NORMAL
THERAPEUTIC RANGE: ABNORMAL SECS (ref 58–77)
WBC # BLD AUTO: 8.1 K/UL (ref 3.6–11)

## 2025-08-07 PROCEDURE — 85025 COMPLETE CBC W/AUTO DIFF WBC: CPT

## 2025-08-07 PROCEDURE — 85610 PROTHROMBIN TIME: CPT

## 2025-08-07 PROCEDURE — 80053 COMPREHEN METABOLIC PANEL: CPT

## 2025-08-07 PROCEDURE — 36415 COLL VENOUS BLD VENIPUNCTURE: CPT

## 2025-08-07 PROCEDURE — 86901 BLOOD TYPING SEROLOGIC RH(D): CPT

## 2025-08-07 PROCEDURE — 86900 BLOOD TYPING SEROLOGIC ABO: CPT

## 2025-08-07 PROCEDURE — 85730 THROMBOPLASTIN TIME PARTIAL: CPT

## 2025-08-07 PROCEDURE — 99283 EMERGENCY DEPT VISIT LOW MDM: CPT

## 2025-08-07 PROCEDURE — 86850 RBC ANTIBODY SCREEN: CPT

## 2025-08-07 ASSESSMENT — PAIN - FUNCTIONAL ASSESSMENT: PAIN_FUNCTIONAL_ASSESSMENT: NONE - DENIES PAIN
